# Patient Record
Sex: FEMALE | Race: WHITE | NOT HISPANIC OR LATINO | Employment: PART TIME | URBAN - METROPOLITAN AREA
[De-identification: names, ages, dates, MRNs, and addresses within clinical notes are randomized per-mention and may not be internally consistent; named-entity substitution may affect disease eponyms.]

---

## 2017-01-06 ENCOUNTER — TELEPHONE (OUTPATIENT)
Dept: GASTROENTEROLOGY | Facility: CLINIC | Age: 52
End: 2017-01-06

## 2017-01-06 NOTE — TELEPHONE ENCOUNTER
Called pt, no answer, LM with my direct line to return call  Need to get pt scheduled on Magdalena Morales's schedule

## 2017-01-11 NOTE — TELEPHONE ENCOUNTER
Came across pts cell number  I called, no answer, LM with direct line explaining to return call so I can get her scheduled

## 2017-01-19 NOTE — TELEPHONE ENCOUNTER
Called pt, no answer, LM explaining I verified her insurance. We do take it and I was calling to get her scheduled.  Left my direct line so she can return my call

## 2017-01-20 NOTE — TELEPHONE ENCOUNTER
Pt returned my call yesterday  I called her back today, no answer, LM and apologized for not getting back with her and explained we are re vamping our schedules. I will call her one day next wk to get her scheduled. Left my direct line in case she had any questions

## 2017-02-02 ENCOUNTER — TELEPHONE (OUTPATIENT)
Dept: GASTROENTEROLOGY | Facility: CLINIC | Age: 52
End: 2017-02-02

## 2017-02-02 NOTE — TELEPHONE ENCOUNTER
Called and spoke w pt  Feb dates and times didn't work for pt  Scheduled her for march 9th @ 1  Pt verbalized understanding and mailing appt slip

## 2017-03-01 ENCOUNTER — TELEPHONE (OUTPATIENT)
Dept: GASTROENTEROLOGY | Facility: CLINIC | Age: 52
End: 2017-03-01

## 2017-03-09 ENCOUNTER — LAB VISIT (OUTPATIENT)
Dept: LAB | Facility: HOSPITAL | Age: 52
End: 2017-03-09
Attending: INTERNAL MEDICINE
Payer: COMMERCIAL

## 2017-03-09 ENCOUNTER — OFFICE VISIT (OUTPATIENT)
Dept: GASTROENTEROLOGY | Facility: CLINIC | Age: 52
End: 2017-03-09
Payer: COMMERCIAL

## 2017-03-09 ENCOUNTER — TELEPHONE (OUTPATIENT)
Dept: GASTROENTEROLOGY | Facility: CLINIC | Age: 52
End: 2017-03-09

## 2017-03-09 VITALS
DIASTOLIC BLOOD PRESSURE: 90 MMHG | TEMPERATURE: 98 F | HEART RATE: 64 BPM | HEIGHT: 62 IN | WEIGHT: 120.38 LBS | SYSTOLIC BLOOD PRESSURE: 137 MMHG | BODY MASS INDEX: 22.15 KG/M2 | RESPIRATION RATE: 20 BRPM

## 2017-03-09 DIAGNOSIS — K51.30 ULCERATIVE PROCTOSIGMOIDITIS, WITHOUT COMPLICATIONS: Primary | ICD-10-CM

## 2017-03-09 DIAGNOSIS — K63.5 POLYP OF ASCENDING COLON, UNSPECIFIED TYPE: ICD-10-CM

## 2017-03-09 DIAGNOSIS — Z91.89 HIGH RISK FOR COLON CANCER: ICD-10-CM

## 2017-03-09 DIAGNOSIS — K51.30 ULCERATIVE PROCTOSIGMOIDITIS, WITHOUT COMPLICATIONS: ICD-10-CM

## 2017-03-09 DIAGNOSIS — F41.9 ANXIETY: ICD-10-CM

## 2017-03-09 LAB
BILIRUB UR QL STRIP: NEGATIVE
CLARITY UR REFRACT.AUTO: CLEAR
COLOR UR AUTO: YELLOW
GLUCOSE UR QL STRIP: NEGATIVE
HGB UR QL STRIP: NEGATIVE
KETONES UR QL STRIP: NEGATIVE
LEUKOCYTE ESTERASE UR QL STRIP: NEGATIVE
MICROSCOPIC COMMENT: ABNORMAL
NITRITE UR QL STRIP: NEGATIVE
PH UR STRIP: 6 [PH] (ref 5–8)
PROT UR QL STRIP: NEGATIVE
RBC #/AREA URNS AUTO: 6 /HPF (ref 0–4)
SP GR UR STRIP: 1.02 (ref 1–1.03)
SQUAMOUS #/AREA URNS AUTO: 1 /HPF
URN SPEC COLLECT METH UR: NORMAL
UROBILINOGEN UR STRIP-ACNC: NEGATIVE EU/DL

## 2017-03-09 PROCEDURE — 81001 URINALYSIS AUTO W/SCOPE: CPT

## 2017-03-09 PROCEDURE — 99999 PR PBB SHADOW E&M-EST. PATIENT-LVL IV: CPT | Mod: PBBFAC,,, | Performed by: INTERNAL MEDICINE

## 2017-03-09 PROCEDURE — 1160F RVW MEDS BY RX/DR IN RCRD: CPT | Mod: S$GLB,,, | Performed by: INTERNAL MEDICINE

## 2017-03-09 PROCEDURE — 99205 OFFICE O/P NEW HI 60 MIN: CPT | Mod: S$GLB,,, | Performed by: INTERNAL MEDICINE

## 2017-03-09 RX ORDER — HYDROCHLOROTHIAZIDE 12.5 MG/1
12.5 TABLET ORAL DAILY
Status: ON HOLD | COMMUNITY
Start: 2017-01-25 | End: 2017-12-01 | Stop reason: ALTCHOICE

## 2017-03-09 RX ORDER — MESALAMINE 1.2 G/1
3 TABLET, DELAYED RELEASE ORAL DAILY
COMMUNITY
Start: 2017-03-07 | End: 2017-05-24

## 2017-03-09 NOTE — MR AVS SNAPSHOT
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  Call for this free service at [phone].             Medications                Verify that the below list of medications is an accurate representation of the medications you are currently taking.  If none reported, the list may be blank. If incorrect, please contact your healthcare provider. Carry this list with you in case of emergency.           Current Medications     hydrochlorothiazide (HYDRODIURIL) 12.5 MG Tab once daily.    LIALDA 1.2 gram TbEC Take 3 tablets by mouth once daily.           Clinical Reference Information           Your Vitals Were     BP Pulse Temp Resp Height Weight    137/90 (BP Location: Left arm, Patient Position: Sitting, BP Method: Automatic) 64 98.2 °F (36.8 °C) 20 5' 2" (1.575 m) 54.6 kg (120 lb 5.9 oz)    BMI                22.02 kg/m2          Blood Pressure          Most Recent Value    BP  (!)  137/90      Allergies as of 3/9/2017     Pcn [Penicillins]      Immunizations Administered on Date of Encounter - 3/9/2017     None      Orders Placed During Today's Visit      Normal Orders This Visit    " Case request GI: SIGMOIDOSCOPY-FLEXIBLE     Future Labs/Procedures Expected by Expires    C-reactive protein  3/9/2017 5/8/2018    CBC auto differential  3/9/2017 5/8/2018    Comprehensive metabolic panel  3/9/2017 5/8/2018    Hepatitis B surface antibody  3/9/2017 5/8/2018    Sedimentation rate, manual  3/9/2017 5/8/2018    Urinalysis  3/9/2017 3/9/2018    Varicella zoster antibody, IgG  3/9/2017 5/8/2018    Vitamin D  3/9/2017 5/8/2018      [facility] Sign-Up     Activating your [facility] account is as easy as 1-2-3!     1) Visit [email or web address], select Sign Up Now, enter this activation code and your date of birth, then select Next.  [email or web address]  Expires: 4/23/2017  2:15 PM      2) Create a username and password to use when you visit [facility] in the future and select a security question in case you lose your password and select Next.    3) Enter your e-mail address and click Sign Up!    Additional Information  If you have questions, please e-mail [email or web address] or call [phone] to talk to our [facility] staff. Remember, [facility] is NOT to be used for urgent needs. For medical emergencies, dial 911.         Instructions    - Instructions:  - labs today  - continue lialda 3.6 g/day  - call us a month before you run out for refills  - flex sig in 2 mos  - call if any worsening symptoms  - Avoid all NSAIDs (Advil, Ibuprofen, Motrin, Aspirin, Naprosyn, Aleve)  - get pap smears, eye exam, skin exam  - Use antibiotics with caution  - Vaccines- UTD:  Shingles, tetanus  - follow-up with Magdalena in 1 week after her flex sig               Language Assistance Services     ATTENTION: Language assistance services are available, free of charge. Please call [phone].      ATENCIÓN: Si habla español, tiene a nassar disposición servicios gratuitos de asistencia lingüística. Llame al [phone].     CHÚ Ý: N?u b?n nói Ti?ng Vi?t, có các d?ch v? h? tr? ngôn ng? mi?n phí iliah cho b?n. G?i s? [phone].          [facility] complies with applicable Federal civil rights laws and does not discriminate on the basis of race, color, national origin, age, disability, or sex.

## 2017-03-09 NOTE — PATIENT INSTRUCTIONS
- Instructions:  - labs today  - continue lialda 3.6 g/day  - call us a month before you run out for refills  - flex sig in 2 mos  - call if any worsening symptoms  - Avoid all NSAIDs (Advil, Ibuprofen, Motrin, Aspirin, Naprosyn, Aleve)  - get pap smears, eye exam, skin exam  - Use antibiotics with caution  - Vaccines- UTD:  Shingles, tetanus  - follow-up with Magdalena in 1 week after her flex sig

## 2017-03-09 NOTE — PROGRESS NOTES
[facility]          Inflammatory Bowel Disease New Patient Consultation Note            Dr. Mike Walsh    TODAY'S VISIT DATE:  3/9/2017    Reason for Consult:    Chief Complaint   Patient presents with    Ulcerative Colitis       PCP: Ciera Freeman      Referring MD:   Self Referral    History of Present Illness:   Ansley Bautista who is a 51 y.o. female seen today at the [facility] on 03/09/2017 for inflammatory bowel disease- ulcerative colitis.  Pertinent past medical history includes anxiety, colon polyp (8/2016 serrated polyp in ascending colon).   She has generalized anxiety and has increased diarrhea when this occurs for most of her life.  At the end of July 2016 she began with rectal bleeding with blood on toilet paper and increased gas.  She met Dr. Gracia and he scheduled her for a colonoscopy.  Colonoscopy 8/29/16 showed 4 mm ascending colon serrated polyp with inflammation characterized by altered vascularity, congestion (edema), erosions, erythema, friability, granularity, mucus, and shallow ulcerations found in a continuous and circumferential pattern from the rectum to the sigmoid colon. Biopsies showed chronic active inflammation with focal crypt architectural distortion with neutrophilic cryptitis and crypt abscesses.  She was diagnosed with ulcerative proctosigmoiditis and started on apriso but this was too expensive and she eventually started on lialda 4.8 g/day.  She continued this for 1 month and she went into clinical remission. After a month she started weaning down on the lialda per MD recommendations to lialda 2.4 g/day. At the mid 12/2016 she missed about 2 days of lialda and had recurrent symptoms. She had about 5- 8 loose BMs/day with no blood but urgency and incontinence. She went back to taking lialda 4.8 g/day per recommendations by the on call MD.  She was given flagyl and unknown antibiotic and had diarrhea and metallic  "taste. After stopping flagyl and cipro symptoms improved. By early 2017 she was in clinical remission on lialda 4.8 g/day and went back down to 3.6 g/day less than month.     Currently 2 formed Bms/day with no urgency, no incontinence, no nocturnal bowel movements.  She started with sinus cold symptoms 2 weeks ago with congestion, head pressure, "tickling of throat." Knee pain and history of left shoulder bursitis, tennis elbow. Patient has no other gastrointestinal/constitutional complaints including no fevers or chills, weight loss, nausea/vomiting (including no hematemesis), dysphagia, abdominal pain. Also patient does not have any extraintestinal manifestations of their inflammatory bowel disease including no eye pain/redness, skin lesions/rashes, joint problems, oral ulcers.    Pertinent Endoscopy/Imagin2016 Colonoscopy: 4 mm sessile polyp in the ascending colon--removed (path: benign serrated polyp); inflammation characterized by altered vascularity, congestion (edema), erosions, erythema, friability, granularity, mucus, and shallow ulcerations found in a continuous and circumferential pattern from the rectum to the sigmoid colon (path-sigmoid & rectum: chronic active colitis & proctitis; chronic active coloproctitis characterized by lymphoplasmacytosis, focal crypt architectural distortion; focal Paneth cell metaplasia; neutrophilic cryptitis and crypt abscesses; no epitheliod granulomas identified); moderated and graded as Berger score 2 (no mention of TI or rest of colon)    Pertinent Labs:  No results found for: STOOLCULTURE, TEHMNHSRRQ1K, HNYFQJOYBS2U, CDIFFICILEAN, CDIFFTOX, CDIFFICILEBY  No results found for: CRP  No results found for: SSJRHKEN07CM  No results found for: HEPBIGM, HEPBCAB, HEPBSURFABQU  No results found for: VARICELLAZOS, VARICELLAINT  No results found for: NIL, TBAG, TBAGNIL, MITOGENNIL, TBGOLD  No results found for: TPMTRESULT  No results found for: ANSADAINIT, INFLIXIMAB, " INFLIXINTERP    Prior IBD Therapies:  cipro/flagyl    Current IBD Therapies:  lialda 3.6 g/day    Vaccinations:  Flu shot:  Never had  Chickenpox status/Varicella vaccine: had chickenpox as a child  No results found for: VARICELLAZOS, VARICELLAINT  Tetanus: unknown  Pneumonia vaccine: never had  Hepatitis B: never had  No results found for: HEPBSAB  Hepatitis A: NA  HPV: NA   Meningococcal: NA     NSAID use/indication:  Ibuprofen- headache 3 days ago    Narcotic use:  none    Alternative/Complementary Meds for IBD:  Biotin, glucosamine- not on it now    Review of Systems   Constitutional: Negative for chills, fever and weight loss.   HENT:        No oral ulcers, dysphagia, oral thrush   Eyes: Negative for blurred vision, pain and redness.   Respiratory: Negative for cough and shortness of breath.    Cardiovascular: Negative for chest pain.   Gastrointestinal: Negative for abdominal pain, heartburn, nausea and vomiting.   Genitourinary: Negative for dysuria and hematuria.   Musculoskeletal: Positive for joint pain. Negative for back pain.   Skin: Negative for rash.   Psychiatric/Behavioral: Negative for depression. The patient is nervous/anxious. The patient does not have insomnia.        Medical/Surgical History:    has a past medical history of Colon polyp and Ulcerative colitis.   has a past surgical history that includes cyst removal off of breast; Colonoscopy; breast augumentation; Partial hysterectomy; bilateral knee scopes; and surgery on both feet.    Family History: family history includes Diabetes in her father; Diverticulitis in her mother; Heart disease in her father; Hypertension in her brother, father, and sister. There is no history of Celiac disease, Cirrhosis, Colon cancer, Colon polyps, Crohn's disease, Cystic fibrosis, Esophageal cancer, Inflammatory bowel disease, Hemochromatosis, Irritable bowel syndrome, Liver cancer, Liver disease, Rectal cancer, Stomach cancer, Ulcerative colitis, or Elpidio's  disease.    Social History:  reports that she has never smoked- stopped in her 20s. She does not have any smokeless tobacco history on file. She reports that she drinks about 0.6 - 1.2 oz of alcohol per week  She reports that she does not use illicit drugs.    Review of patient's allergies indicates:  Allergies not on file    Outpatient Prescriptions Marked as Taking for the 3/9/17 encounter (Office Visit) with Mike Walsh MD   Medication Sig Dispense Refill    hydrochlorothiazide (HYDRODIURIL) 12.5 MG Tab once daily.      LIALDA 1.2 gram TbEC Take 3 tablets by mouth once daily.         Vital Signs:  BP: (!) 131/92 Temp: 98.2 °F (36.8 °C) Pulse: 67 Resp: 20  Weight: 54.6 kg (120 lb 5.9 oz)  `   Physical Exam   Constitutional: She is oriented to person, place, and time. She appears well-developed.   HENT:   Mouth/Throat: Oropharynx is clear and moist. No oral lesions.   No oral ulcers or thrush   Eyes: Conjunctivae are normal. Pupils are equal, round, and reactive to light.   Cardiovascular: Normal rate and regular rhythm.    Pulmonary/Chest: Effort normal and breath sounds normal.   Abdominal: Soft. There is no tenderness.   Musculoskeletal:        Right lower leg: She exhibits no swelling.        Left lower leg: She exhibits no swelling.   Neurological: She is alert and oriented to person, place, and time.   Skin: No rash noted.   Psychiatric: She has a normal mood and affect.   Nursing note and vitals reviewed.    Labs:  No results found for: STOOLCULTURE, ZVPWJHYAAQ8A, QJFDKHFDYW2W, CDIFFICILEAN, CDIFFTOX, CDIFFICILEBY  No results found for: WBC, HGB, HCT, MCV, PLT, ALT, AST, GGT, ALKPHOS, BILITOT, TSH, FREET4, SEDRATE, CRP, TTGIGA, IGA, [id number]  No results found for: HEPBIGM, HEPBCAB, HEPBSAB, HEPBSURFABQU  No results found for: VARICELLAZOS, VARICELLAINT  No results found for: NIL, TBAG, TBAGNIL, MITOGENNIL, TBGOLD  No results found for: TPMTRESULT  No results found for: ANSADAINIT, INFLIXIMAB,  INFLIXINTERP    Assessment/Plan:  Ansley Bautista is a 51 y.o. female with ulcerative proctosigmoiditis (symptoms 7/2016, diagnosis 8/2016), anxiety, small serrated colon polyp removed (colonoscopy 8/2016) who has had anxiety and occasional diarrhea for most of her life. In July 2016 she developed rectal bleeding, more frequent bowel movements with flatulence. 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  Patient was started on lialda 4.8 g/day and achieved clinical remission after 1 month.  After that her MD weaned her lialda to 2.4 g/day.  In approximately mid 12/2016 she missed a few days of lialda because she forgot and had recurrent symptoms.  She increased lialda back from 2.4 to 4.8 g/day and was given some antibiotics (unclear with metronidazole what was given) but this caused metallic taste and diarrhea.  After discontinuing antibiotic symptoms resolved and by end of Jan 2017/early Feb 2017 she achieved remission on lialda 4.8 g/day and has been on lialda 3.6 g/day for the past month doing well.      Patient is in clinical remission on lialda 3.6 g/day but I would like her to continue this dose going forward. We discussed in detail the importance of stopping NSAIDs and taking antibiotics with caution. Pt prescribed Z pamela today for sinus infection.  Patient will get flex sig done within next 2 mos to document response to lialda and will call with any worsening symptoms.  I offered her psychology services for her anxiety and she is considering it and will discuss this at next visit. Basic labs today.     # Ulcerative proctosigmoiditis:  - continue lialda 3.6 g/day  - call with any worsening symptoms  - flex sig in 2 mos  - if flex sig normal, colonoscopy in about 2-3 years but sooner if symptoms  - basic labs: CBC, CMP, ESR, CRP, vitamin D  - drug monitoring labs: Cr/UA yearly    # High risk colon cancer:   - risk factors:  Small  serrated adenoma 8/2016, symptom onset for proctosigmoiditis 7/2016   - colonoscopy for surveillance in 8/2021 and will eventually need colonoscopy every 1- 2 years after 12-15 years of disease starting in [id number]    # Anxiety:   - offered psychology services and pt thinking about it and will revisit at next visit  - pt not interested in starting any long term meds    # IBD specific health maintenance:   Pap smear per age  Opthamologic exam recommended yearly    Dermatologic exam recommended yearly     Bone health:  Risk of osteopenia/osteoporosis:  Risks factors: post-menopausal women regardless of disease status  Vitamin D: vitamin D ordered today    Vaccine counseling:  - VZV IgG, HBsAb today   - Tetanus every 10 years recommended  - Flu shot yearly recommended  - Pneumonia vaccine recommended if plans on immunosuppression    Follow up: 2 months- 1 week after flex sig with Magdalena Morales    Total visit time was 60 minutes, more than 50% of which was spent in face-to-face counseling with patient regarding evaluation and management goals and treatment options for ulcerative colitis     Thank you again for sending Ansley Bautista to see Dr. Mike Walsh today at the [facility]. Please don't hesitate to contact Dr. Walsh if there are any questions regarding this evaluation, or if you have any other patients with inflammatory bowel disease for whom you would like a consultation. You can reach Dr. Walsh at [phone] or by email at [email or web address]    Mike Walsh MD  Department of Gastroenterology  Medical Director, Inflammatory Bowel Disease

## 2017-03-10 ENCOUNTER — TELEPHONE (OUTPATIENT)
Dept: GASTROENTEROLOGY | Facility: CLINIC | Age: 52
End: 2017-03-10

## 2017-03-11 ENCOUNTER — NURSE TRIAGE (OUTPATIENT)
Dept: ADMINISTRATIVE | Facility: CLINIC | Age: 52
End: 2017-03-11

## 2017-03-11 NOTE — TELEPHONE ENCOUNTER
"  Reason for Disposition   [1] MODERATE weakness (i.e., interferes with work, school, normal activities) AND [2] persists > 3 days    Answer Assessment - Initial Assessment Questions  1. DESCRIPTION: "Describe how you are feeling."      General fatigue   2. SEVERITY: "How bad is it?"  "Can you stand and walk?"    - MILD - Feels weak or tired, but does not interfere with work, school or normal activities    - MODERATE - Able to stand and walk; weakness interferes with work, school, or normal activities    - SEVERE - Unable to stand or walk      Moderate   3. ONSET:  "When did the weakness begin?"      Several weeks   4. CAUSE: "What do you think is causing the weakness?"      Patient believes its related to low sodium and low potassium   5. OTHER SYMPTOMS: "Do you have any other symptoms?" (e.g., chest pain, fever, cough, SOB, vomiting, diarrhea, bleeding)      No   6. PREGNANCY: "Is there any chance you are pregnant?" "When was your last menstrual period?"      Pre-menopausal    Protocols used: ST WEAKNESS (GENERALIZED) AND FATIGUE-A-AH    "

## 2017-03-11 NOTE — TELEPHONE ENCOUNTER
Spoke with on-call provider Dr. ELIJAH Capone states he does not accepts call for that particular provider Dr. ELIJAH Freeman. [facility] states confirmed there are no assigned providers for the other [facility] gastro group. Call placed to patient to informed her of such and to reiterate [facility] protocol care advisement of within four hours of triage to seek medical evaluation. Unsuccessful attempts to reach patient to informed her of such no answer; left voice mail.

## 2017-03-13 ENCOUNTER — TELEPHONE (OUTPATIENT)
Dept: GASTROENTEROLOGY | Facility: CLINIC | Age: 52
End: 2017-03-13

## 2017-03-13 NOTE — TELEPHONE ENCOUNTER
Returned pts call  Will transfer her to release of medical information office    Pt states she wont be scheduling for procedure until she gets her energy level up

## 2017-03-13 NOTE — TELEPHONE ENCOUNTER
----- Message from Anamika Archer MA sent at 3/13/2017 12:05 PM CDT -----  Contact: [phone]   SALVADOR Walsh   Pt's  PCP at [facility] wants to see her most recent lab work. Can she get an after visit summary or send call to medical records.   [phone]

## 2017-04-26 ENCOUNTER — TELEPHONE (OUTPATIENT)
Dept: ENDOSCOPY | Facility: HOSPITAL | Age: 52
End: 2017-04-26

## 2017-04-26 NOTE — TELEPHONE ENCOUNTER
Patient cannot scheduled Flex-Sig until June, when her  will be available to accompany her to the procedure. Patient will call back at a later date, once June has arrived.

## 2017-05-23 ENCOUNTER — TELEPHONE (OUTPATIENT)
Dept: GASTROENTEROLOGY | Facility: CLINIC | Age: 52
End: 2017-05-23

## 2017-05-23 DIAGNOSIS — K51.30: Primary | ICD-10-CM

## 2017-05-23 NOTE — TELEPHONE ENCOUNTER
----- Message from Laci Aguilera sent at 5/23/2017  4:05 PM CDT -----  Contact: Pt:[phone]  Pt called and states she called earlier to speak with the nurse and would like to speak with the nurse in regards to Rx [phone].

## 2017-05-24 RX ORDER — MESALAMINE 0.38 G/1
1.5 CAPSULE, EXTENDED RELEASE ORAL DAILY
Qty: 120 CAPSULE | Refills: 5 | Status: SHIPPED | OUTPATIENT
Start: 2017-05-24 | End: 2017-11-24 | Stop reason: SDUPTHER

## 2017-05-24 NOTE — TELEPHONE ENCOUNTER
Pt tried to refill her Lialda and she was told by her pharmacy that the savings card she was using has reached its max. So now the medication is going to cost her $200 a mth. ([facility] pharmacy) Tried an assistance program from [employer] and they make to much money. Pt has about 5 - 6 days left   She has noticed her stools are changing but she has also changed her diet. Her stools are not as formed as they had been. She's having about 2 - 5 BMs a day. No blood and no nocturnal. No fever or chills  She really thinks it is because she has started to eat more nuts and fruits

## 2017-05-24 NOTE — TELEPHONE ENCOUNTER
Spoke to patient re Mario monroe.  Discussed with Dr. Walsh and will try to send in an RX for Apriso 1.5 gm/day and send her a coupon card to see if this is more affordable.  Explained to please be patient and that I will work with the pharmacy to try to figure out the cheapest option for her to continue her 5ASA.  I also asked about her flex sig and her  has been on a turn around at work and has not been able to bring her.  She will be scheduling within the next few weeks and then should have a clinic visit with me 1 week later.  She will call us if there are any additional problems with her medications.  Patient verbalizes understanding and agrees with the treatment plan.  Questions answered.    KG

## 2017-05-25 ENCOUNTER — TELEPHONE (OUTPATIENT)
Dept: GASTROENTEROLOGY | Facility: CLINIC | Age: 52
End: 2017-05-25

## 2017-05-26 NOTE — TELEPHONE ENCOUNTER
----- Message from Susan Emilia sent at 5/25/2017  1:50 PM CDT -----  Contact: self - [phone]  Walsh - is calling re her meds  - states she left message yesterday -  Please call patient at

## 2017-06-01 ENCOUNTER — TELEPHONE (OUTPATIENT)
Dept: GASTROENTEROLOGY | Facility: CLINIC | Age: 52
End: 2017-06-01

## 2017-07-03 NOTE — TELEPHONE ENCOUNTER
Attempting to follow up with Ms. Bautista re setting up flex sig.    Pt was unable to set up prior due to spouse's work schedule.    Pt verbalized she would call to set up in June, however no record of call rcvd.     Left message for pt to please return call to office.    -Pt will need to schedule flex sig and 1 week clinic f/u with Magdalena after procedure.

## 2017-07-25 ENCOUNTER — TELEPHONE (OUTPATIENT)
Dept: GASTROENTEROLOGY | Facility: CLINIC | Age: 52
End: 2017-07-25

## 2017-07-25 NOTE — TELEPHONE ENCOUNTER
----- Message from Susan Shipmankert sent at 7/24/2017 10:58 AM CDT -----  Contact: self - [phone]  Jillian - can she still do cscope - was on z-pack for throat infection - please call patient at

## 2017-07-25 NOTE — TELEPHONE ENCOUNTER
Patient reported that she was taking Z-pack, but that it caused her to break out in a rash, so she stopped the medication and informed the prescribing MD.    I informed patient that the abovementioned will not effect her 8/8/2017 endoscopy procedure date. Patient verbalized understanding.    -ST

## 2017-08-02 ENCOUNTER — TELEPHONE (OUTPATIENT)
Dept: GASTROENTEROLOGY | Facility: CLINIC | Age: 52
End: 2017-08-02

## 2017-08-02 DIAGNOSIS — R53.83 FATIGUE, UNSPECIFIED TYPE: Primary | ICD-10-CM

## 2017-08-02 NOTE — TELEPHONE ENCOUNTER
----- Message from Anamika Archer MA sent at 8/2/2017  9:19 AM CDT -----  Contact: [phone]  SALVADOR Walsh  Pt states that she has a H/O ulcerative colitis and asking if there is anything OTC that she can safely take for sinus pressure   [phone]

## 2017-08-02 NOTE — TELEPHONE ENCOUNTER
Called and spoke with pt  I explained the only restriction for sinus meds is to make sure they do not have NSAIDs. Explained what NSAIDs are. Then I told her she can take Sudafed, Zyrtec D, Claritin D, and possibly Dayquil or Nyquil but to check the ingredients on the last 2 to make sure there are no NSAID's.   She then mentioned she has been fatigued lately. She said she called her PCP and was told that her PCP was leaving and they have to get her set up with a different physician and it will be awhile so that is why she is asking us.  She stated that generally when she feels like this her potassium is low. So she was wondering if she should get some labs and if so she would like to do them same day as her procedure 08/08. She was also wondering if the low potassium is from her UC

## 2017-08-03 NOTE — TELEPHONE ENCOUNTER
Called and spoke with pt  I got her labs scheduled for 08/08 @ 9:45  I noticed she needed to be scheduled for a follow up 1 wk after Flex with Magdalena. I went to schedule that and Dr Walsh is out the wk her follow up is due so I told her I would check and see if she needed to be here and I would call back to get her scheduled   She expressed understanding and appreciated the call

## 2017-08-08 ENCOUNTER — SURGERY (OUTPATIENT)
Age: 52
End: 2017-08-08

## 2017-08-08 ENCOUNTER — LAB VISIT (OUTPATIENT)
Dept: LAB | Facility: HOSPITAL | Age: 52
End: 2017-08-08
Attending: INTERNAL MEDICINE
Payer: COMMERCIAL

## 2017-08-08 ENCOUNTER — ANESTHESIA EVENT (OUTPATIENT)
Dept: ENDOSCOPY | Facility: HOSPITAL | Age: 52
End: 2017-08-08
Payer: COMMERCIAL

## 2017-08-08 ENCOUNTER — TELEPHONE (OUTPATIENT)
Dept: GASTROENTEROLOGY | Facility: CLINIC | Age: 52
End: 2017-08-08

## 2017-08-08 ENCOUNTER — ANESTHESIA (OUTPATIENT)
Dept: ENDOSCOPY | Facility: HOSPITAL | Age: 52
End: 2017-08-08
Payer: COMMERCIAL

## 2017-08-08 ENCOUNTER — HOSPITAL ENCOUNTER (OUTPATIENT)
Facility: HOSPITAL | Age: 52
Discharge: HOME | End: 2017-08-08
Attending: INTERNAL MEDICINE | Admitting: INTERNAL MEDICINE
Payer: COMMERCIAL

## 2017-08-08 VITALS
DIASTOLIC BLOOD PRESSURE: 88 MMHG | WEIGHT: 117 LBS | HEIGHT: 62 IN | HEART RATE: 64 BPM | RESPIRATION RATE: 16 BRPM | OXYGEN SATURATION: 96 % | SYSTOLIC BLOOD PRESSURE: 134 MMHG | BODY MASS INDEX: 21.53 KG/M2 | TEMPERATURE: 98 F

## 2017-08-08 VITALS — RESPIRATION RATE: 18 BRPM

## 2017-08-08 DIAGNOSIS — R53.83 FATIGUE, UNSPECIFIED TYPE: ICD-10-CM

## 2017-08-08 DIAGNOSIS — K51.30 ULCERATIVE RECTOSIGMOIDITIS WITHOUT COMPLICATION: Primary | ICD-10-CM

## 2017-08-08 DIAGNOSIS — K51.90 ULCERATIVE COLITIS: ICD-10-CM

## 2017-08-08 LAB
ALBUMIN SERPL BCP-MCNC: 3.7 G/DL
ALP SERPL-CCNC: 68 U/L
ALT SERPL W/O P-5'-P-CCNC: 20 U/L
ANION GAP SERPL CALC-SCNC: 7 MMOL/L
AST SERPL-CCNC: 17 U/L
BASOPHILS # BLD AUTO: 0.01 K/UL
BASOPHILS NFR BLD: 0.2 %
BILIRUB SERPL-MCNC: 0.6 MG/DL
BUN SERPL-MCNC: 17 MG/DL
CALCIUM SERPL-MCNC: 8.9 MG/DL
CHLORIDE SERPL-SCNC: 104 MMOL/L
CO2 SERPL-SCNC: 29 MMOL/L
CREAT SERPL-MCNC: 0.9 MG/DL
DIFFERENTIAL METHOD: NORMAL
EOSINOPHIL # BLD AUTO: 0 K/UL
EOSINOPHIL NFR BLD: 0 %
ERYTHROCYTE [DISTWIDTH] IN BLOOD BY AUTOMATED COUNT: 12.8 %
EST. GFR  (AFRICAN AMERICAN): >60 ML/MIN/1.73 M^2
EST. GFR  (NON AFRICAN AMERICAN): >60 ML/MIN/1.73 M^2
GLUCOSE SERPL-MCNC: 86 MG/DL
HCT VFR BLD AUTO: 42.5 %
HGB BLD-MCNC: 14.2 G/DL
LYMPHOCYTES # BLD AUTO: 1.2 K/UL
LYMPHOCYTES NFR BLD: 26.1 %
MCH RBC QN AUTO: 29 PG
MCHC RBC AUTO-ENTMCNC: 33.4 G/DL
MCV RBC AUTO: 87 FL
MONOCYTES # BLD AUTO: 0.4 K/UL
MONOCYTES NFR BLD: 8.3 %
NEUTROPHILS # BLD AUTO: 3 K/UL
NEUTROPHILS NFR BLD: 65 %
PLATELET # BLD AUTO: 196 K/UL
PMV BLD AUTO: 10.4 FL
POTASSIUM SERPL-SCNC: 4 MMOL/L
PROT SERPL-MCNC: 7 G/DL
RBC # BLD AUTO: 4.89 M/UL
SODIUM SERPL-SCNC: 140 MMOL/L
WBC # BLD AUTO: 4.56 K/UL

## 2017-08-08 PROCEDURE — 88305 TISSUE EXAM BY PATHOLOGIST: CPT | Mod: 26,,, | Performed by: PATHOLOGY

## 2017-08-08 PROCEDURE — 37000008 HC ANESTHESIA 1ST 15 MINUTES: Performed by: INTERNAL MEDICINE

## 2017-08-08 PROCEDURE — 45331 SIGMOIDOSCOPY AND BIOPSY: CPT | Mod: ,,, | Performed by: INTERNAL MEDICINE

## 2017-08-08 PROCEDURE — D9220A PRA ANESTHESIA: Mod: ANES,,, | Performed by: ANESTHESIOLOGY

## 2017-08-08 PROCEDURE — 63600175 PHARM REV CODE 636 W HCPCS: Performed by: NURSE ANESTHETIST, CERTIFIED REGISTERED

## 2017-08-08 PROCEDURE — 88305 TISSUE EXAM BY PATHOLOGIST: CPT | Performed by: PATHOLOGY

## 2017-08-08 PROCEDURE — 27201012 HC FORCEPS, HOT/COLD, DISP: Performed by: INTERNAL MEDICINE

## 2017-08-08 PROCEDURE — 85025 COMPLETE CBC W/AUTO DIFF WBC: CPT

## 2017-08-08 PROCEDURE — 80053 COMPREHEN METABOLIC PANEL: CPT

## 2017-08-08 PROCEDURE — D9220A PRA ANESTHESIA: Mod: CRNA,,, | Performed by: NURSE ANESTHETIST, CERTIFIED REGISTERED

## 2017-08-08 PROCEDURE — 36415 COLL VENOUS BLD VENIPUNCTURE: CPT

## 2017-08-08 PROCEDURE — 45331 SIGMOIDOSCOPY AND BIOPSY: CPT | Performed by: INTERNAL MEDICINE

## 2017-08-08 PROCEDURE — 25000003 PHARM REV CODE 250: Performed by: INTERNAL MEDICINE

## 2017-08-08 PROCEDURE — 37000009 HC ANESTHESIA EA ADD 15 MINS: Performed by: INTERNAL MEDICINE

## 2017-08-08 RX ORDER — PROPOFOL 10 MG/ML
VIAL (ML) INTRAVENOUS
Status: DISCONTINUED | OUTPATIENT
Start: 2017-08-08 | End: 2017-08-08

## 2017-08-08 RX ORDER — LIDOCAINE HCL/PF 100 MG/5ML
SYRINGE (ML) INTRAVENOUS
Status: DISCONTINUED | OUTPATIENT
Start: 2017-08-08 | End: 2017-08-08

## 2017-08-08 RX ORDER — SODIUM CHLORIDE 9 MG/ML
INJECTION, SOLUTION INTRAVENOUS CONTINUOUS
Status: DISCONTINUED | OUTPATIENT
Start: 2017-08-08 | End: 2017-08-08 | Stop reason: HOSPADM

## 2017-08-08 RX ADMIN — PROPOFOL 20 MG: 10 INJECTION, EMULSION INTRAVENOUS at 08:08

## 2017-08-08 RX ADMIN — PROPOFOL 80 MG: 10 INJECTION, EMULSION INTRAVENOUS at 08:08

## 2017-08-08 RX ADMIN — PROPOFOL 40 MG: 10 INJECTION, EMULSION INTRAVENOUS at 08:08

## 2017-08-08 RX ADMIN — SODIUM CHLORIDE: 0.9 INJECTION, SOLUTION INTRAVENOUS at 07:08

## 2017-08-08 RX ADMIN — LIDOCAINE HYDROCHLORIDE 60 MG: 20 INJECTION, SOLUTION INTRAVENOUS at 08:08

## 2017-08-08 NOTE — H&P
Short Stay Endoscopy History and Physical    PCP - Ciera Freeman MD  Referring Physician - Mike Walsh MD  [address] [place] [address]    Procedure - Flex sig  ASA - per anesthesia  Mallampati - per anesthesia  History of Anesthesia problems - no  Family history Anesthesia problems -  no   Plan of anesthesia - General    HPI  52 y.o. female  Reason for procedure: follow up ulcerative proctosigmoiditis    ROS:  Constitutional: No fevers, chills, No weight loss  CV: No chest pain  Pulm: No cough, No shortness of breath  GI: see HPI    Medical History:  has a past medical history of Colon polyp; Hypertension; and Ulcerative colitis.    Surgical History:  has a past surgical history that includes cyst removal off of breast; Colonoscopy; breast augumentation; Partial hysterectomy; bilateral knee scopes; and surgery on both feet.    Family History: family history includes Diabetes in her father; Diverticulitis in her mother; Heart disease in her father; Hypertension in her brother, father, and sister.. Otherwise no colon cancer, inflammatory bowel disease, or GI malignancies.    Social History:  reports that she has quit smoking. She has never used smokeless tobacco. She reports that she drinks about 0.6 - 1.2 oz of alcohol per week . She reports that she does not use drugs.    Review of patient's allergies indicates:   Allergen Reactions    Pcn [penicillins] Anaphylaxis       Medications:   Prescriptions Prior to Admission   Medication Sig Dispense Refill Last Dose    hydrochlorothiazide (HYDRODIURIL) 12.5 MG Tab once daily.   8/8/2017 at 0600    mesalamine (APRISO) 0.375 gram Cp24 Take 4 capsules (1.5 g total) by mouth once daily. 120 capsule 5 8/6/2017       Physical Exam:    Vital Signs:   Vitals:    08/08/17 0752   BP: (!) 183/95   Pulse: 73   Resp: 17   Temp: 97.9 °F (36.6 °C)       General Appearance: Well appearing in no acute distress  Lungs: CTA anteriorly  Heart:  Regular rate, S1,  S2 normal, no murmurs heard.  Abdomen: Soft, non tender, non distended with normal bowel sounds, no masses  Extremities: No edema    Labs:  Lab Results   Component Value Date    WBC 5.98 03/09/2017    HGB 14.7 03/09/2017    HCT 43.9 03/09/2017     03/09/2017    ALT 32 03/09/2017    AST 28 03/09/2017     (L) 03/09/2017    K 3.4 (L) 03/09/2017    CL 96 03/09/2017    CREATININE 0.9 03/09/2017    BUN 16 03/09/2017    CO2 30 (H) 03/09/2017       I have explained the risks and benefits of this endoscopic procedure to the patient including but not limited to bleeding, inflammation, infection, perforation, and death.      Mike Walsh MD

## 2017-08-08 NOTE — TELEPHONE ENCOUNTER
----- Message from Mike Walsh MD sent at 8/8/2017 11:12 AM CDT -----  Please let patient know that her labs are all normal including her potassium    SS

## 2017-08-08 NOTE — TELEPHONE ENCOUNTER
Called and spoke with pt  Explained message below. She had a few questions that I answered and expressed understanding.   Then I got her follow up scheduled for 08/28/2017 @ 1:00    Appoint reminder mailed

## 2017-08-08 NOTE — PATIENT INSTRUCTIONS
Discharge Summary/Instructions after an Endoscopic Procedure  Patient Name: Ansley Bautista  Patient MRN: [id number]  Patient YOB: 1965 Tuesday, August 08, 2017  Mike Walsh MD  RESTRICTIONS ON ACTIVITY:  - DO NOT drive a car, operate machinery, make legal/financial decisions, or   drink alcohol until the day after the procedure.    - The following day: return to full activity including work, except no heavy   lifting, straining or running for 3 days if polyps were removed.  - Diet: Eat and drink normally unless instructed otherwise.  TREATMENT FOR COMMON SIDE EFFECTS:  - Mild abdominal pain, bloating or excessive gas: rest, eat lightly and use   a heating pad.  - Sore Throat - treat with throat lozenges. Gargle with warm salt water.  SYMPTOMS TO WATCH FOR AND REPORT TO YOUR PHYSICIAN:  1. Severe abdominal pain or bloating.  2. Pain in chest.  3. Chills or fever occurring within 24 hours after a procedure.  4. A large amount of rectal bleeding, which would show as bright red,   maroon, or black stools. (A small amount of blood from the rectum is not   serious, especially if hemorrhoids are present.)  5. Because air was used during the procedure, expelling large amounts of air   from your rectum or belching is normal.  6. If a bowel prep was taken, you may not have a bowel movement for 1-3   days.  This is normal.  7. Go directly to the emergency room if you notice any of the following:   Chills and/or fever over 101 F   Persistent vomiting   Severe abdominal pain, other than gas cramps   Severe chest pain   Black, tarry stools   Any bleeding - exceeding one tablespoon  Your doctor recommends these additional instructions:  If any biopsies were performed, my office will call you in 5 to 6 business   days with any results.  Take a fiber supplement, for example Citrucel, Fibercon, Konsyl or   Metamucil.   You are being discharged to home.  For questions, problems or results please call your physician -  Mike Walsh MD at Work:  [phone].  [facility], EMERGENCY ROOM PHONE NUMBER: [phone]  IF A COMPLICATION OR EMERGENCY SITUATION ARISES AND YOU ARE UNABLE TO REACH   YOUR PHYSICIAN - GO TO THE EMERGENCY ROOM.  Mike Walsh MD  8/8/2017 8:57:16 AM  This report has been verified and signed electronically.

## 2017-08-08 NOTE — TRANSFER OF CARE
"Anesthesia Transfer of Care Note    Patient: Ansley Bautista    Procedure(s) Performed: Procedure(s) (LRB):  SIGMOIDOSCOPY-FLEXIBLE (N/A)    Patient location: PACU    Anesthesia Type: general    Transport from OR: Transported from OR on room air with adequate spontaneous ventilation    Post pain: adequate analgesia    Post assessment: no apparent anesthetic complications and tolerated procedure well    Post vital signs: stable    Level of consciousness: responds to stimulation    Nausea/Vomiting: no nausea/vomiting    Complications: none    Transfer of care protocol was followed      Last vitals:   Visit Vitals  /76 (BP Location: Left arm, Patient Position: Lying, BP Method: Automatic)   Pulse 66   Temp 36.7 °C (98.1 °F) (Oral)   Resp 14   Ht 5' 2" (1.575 m)   Wt 53.1 kg (117 lb)   SpO2 97%   Breastfeeding? No   BMI 21.40 kg/m²     "

## 2017-08-08 NOTE — ANESTHESIA POSTPROCEDURE EVALUATION
"Anesthesia Post Evaluation    Patient: Ansley Bautista    Procedure(s) Performed: Procedure(s) (LRB):  SIGMOIDOSCOPY-FLEXIBLE (N/A)    Final Anesthesia Type: general  Patient location during evaluation: GI PACU  Patient participation: Yes- Able to Participate  Level of consciousness: awake and alert, oriented and awake  Post-procedure vital signs: reviewed and stable  Pain management: adequate  Airway patency: patent  PONV status at discharge: No PONV  Anesthetic complications: no      Cardiovascular status: blood pressure returned to baseline and hemodynamically stable  Respiratory status: unassisted, spontaneous ventilation and room air  Hydration status: euvolemic  Follow-up not needed.        Visit Vitals  /88 (BP Location: Left arm, Patient Position: Sitting, BP Method: Automatic)   Pulse 64   Temp 36.7 °C (98.1 °F) (Oral)   Resp 16   Ht 5' 2" (1.575 m)   Wt 53.1 kg (117 lb)   SpO2 96%   Breastfeeding? No   BMI 21.40 kg/m²       Pain/Starr Score: Pain Assessment Performed: Yes (8/8/2017  8:57 AM)  Presence of Pain: denies (8/8/2017  9:25 AM)  Starr Score: 10 (8/8/2017  9:25 AM)      "

## 2017-08-08 NOTE — ANESTHESIA PREPROCEDURE EVALUATION
08/08/2017  Ansley Bautista is a 52 y.o., female.    Anesthesia Evaluation    I have reviewed the Patient Summary Reports.    I have reviewed the Nursing Notes.      Review of Systems  Anesthesia Hx:  No problems with previous Anesthesia    Cardiovascular:   Hypertension    Hepatic/GI:   PUD,        Physical Exam  General:  Well nourished    Airway/Jaw/Neck:  Airway Findings: Mouth Opening: Normal Tongue: Normal  General Airway Assessment: Adult  Mallampati: II  TM Distance: Normal, at least 6 cm  Jaw/Neck Findings:  Neck ROM: Normal ROM     Eyes/Ears/Nose:  Eyes/Ears/Nose Findings:    Dental:  Dental Findings: In tact   Chest/Lungs:  Chest/Lungs Findings: Normal Respiratory Rate     Heart/Vascular:  Heart Findings: Rate: Normal  Rhythm: Regular Rhythm        Mental Status:  Mental Status Findings:  Cooperative, Alert and Oriented         Anesthesia Plan  Type of Anesthesia, risks & benefits discussed:  Anesthesia Type:  general  Patient's Preference: general  Intra-op Monitoring Plan: standard ASA monitors  Intra-op Monitoring Plan Comments:   Post Op Pain Control Plan:   Post Op Pain Control Plan Comments:   Induction:   IV  Beta Blocker:  Patient is not currently on a Beta-Blocker (No further documentation required).       Informed Consent: Patient understands risks and agrees with Anesthesia plan.  Questions answered. Anesthesia consent signed with patient.  ASA Score: 2     Day of Surgery Review of History & Physical:    H&P update referred to the surgeon.         Ready For Surgery From Anesthesia Perspective.

## 2017-08-15 ENCOUNTER — TELEPHONE (OUTPATIENT)
Dept: ENDOSCOPY | Facility: HOSPITAL | Age: 52
End: 2017-08-15

## 2017-08-28 ENCOUNTER — OFFICE VISIT (OUTPATIENT)
Dept: GASTROENTEROLOGY | Facility: CLINIC | Age: 52
End: 2017-08-28
Payer: COMMERCIAL

## 2017-08-28 VITALS
DIASTOLIC BLOOD PRESSURE: 86 MMHG | RESPIRATION RATE: 14 BRPM | HEART RATE: 58 BPM | SYSTOLIC BLOOD PRESSURE: 138 MMHG | TEMPERATURE: 98 F | BODY MASS INDEX: 22.07 KG/M2 | HEIGHT: 62 IN | WEIGHT: 119.94 LBS

## 2017-08-28 DIAGNOSIS — I10 ESSENTIAL HYPERTENSION: ICD-10-CM

## 2017-08-28 DIAGNOSIS — K63.5 POLYP OF ASCENDING COLON, UNSPECIFIED TYPE: ICD-10-CM

## 2017-08-28 DIAGNOSIS — K51.30 ULCERATIVE RECTOSIGMOIDITIS WITHOUT COMPLICATION: Primary | ICD-10-CM

## 2017-08-28 DIAGNOSIS — Z91.89 HIGH RISK FOR COLON CANCER: ICD-10-CM

## 2017-08-28 DIAGNOSIS — F41.9 ANXIETY: ICD-10-CM

## 2017-08-28 PROCEDURE — 99999 PR PBB SHADOW E&M-EST. PATIENT-LVL IV: CPT | Mod: PBBFAC,,, | Performed by: NURSE PRACTITIONER

## 2017-08-28 PROCEDURE — 99215 OFFICE O/P EST HI 40 MIN: CPT | Mod: S$GLB,,, | Performed by: NURSE PRACTITIONER

## 2017-08-28 PROCEDURE — 3008F BODY MASS INDEX DOCD: CPT | Mod: S$GLB,,, | Performed by: NURSE PRACTITIONER

## 2017-08-28 NOTE — PROGRESS NOTES
[facility]             Inflammatory Bowel Disease Follow-up  Note            Mike Walsh MD & EUFEMIA Nunn  TODAY'S VISIT DATE:  8/28/2017    Chief Complaint:   Chief Complaint   Patient presents with    Ulcerative Colitis     PCP: Ciera Freeman    Previous History:   Ansley Bautista who is a 52 y.o. female with ulcerative proctosigmoiditis (symptoms 7/2016, diagnosis 8/2016), anxiety, small serrated colon polyp removed (colonoscopy 8/2016) who has had anxiety and occasional diarrhea for most of her life. In July 2016 she developed rectal bleeding, more frequent bowel movements with flatulence. 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  Patient was started on lialda 4.8 g/day and achieved clinical remission after 1 month.  After that her MD weaned her lialda to 2.4 g/day.  In approximately mid 12/2016 she missed a few days of lialda because she forgot and had recurrent symptoms.  She increased lialda back from 2.4 to 4.8 g/day and was given some antibiotics (unclear with metronidazole what was given) but this caused metallic taste and diarrhea.  After discontinuing antibiotic symptoms resolved and by end of Jan 2017/early Feb 2017 she achieved remission on lialda 4.8 g/day and has been on lialda 3.6 g/day and continued to do well. We planned for a flex sig to assess for medication response.      Interval History:  - current IBD meds: apriso 1.5 gm/day (started in 5/2017), changed from lialda due to cost  - 3 loose-formed Bowel Movements/day  - 8/8/2017 flex sig: Some mild decrease in vasculature in the rectum, inflammatory pseudopolyp in the rectum otherwise no active inflammation consistent with remission, descending, sigmoid and distal/mid transverse colon appeared normal (path-transverse, descending, sigmoid, rectum: normal)  - constitutional/GI symptoms: no fevers/chills, weight loss, dysphagia,  GERD, abdominal pain  - extraintestinal manifestations: no eye pain/redness, skin lesions/rashes, liver problems, joint pain/swelling/stiffness    Pertinent Endoscopy/Imagin2016 Colonoscopy: 4 mm sessile polyp in the ascending colon--removed (path: benign serrated polyp); inflammation characterized by altered vascularity, congestion (edema), erosions, erythema, friability, granularity, mucus, and shallow ulcerations found in a continuous and circumferential pattern from the rectum to the sigmoid colon (path-sigmoid & rectum: chronic active colitis & proctitis; chronic active coloproctitis characterized by lymphoplasmacytosis, focal crypt architectural distortion; focal Paneth cell metaplasia; neutrophilic cryptitis and crypt abscesses; no epitheliod granulomas identified); moderated and graded as Berger score 2 (no mention of TI or rest of colon)  2017 flex sig: Some mild decrease in vasculature in the rectum, inflammatory pseudopolyp in the rectum otherwise no active inflammation consistent with remission, descending, sigmoid and distal/mid transverse colon appeared normal (path-transverse, descending, sigmoid, rectum: normal)    Pertinent Labs:  No results found for: STOOLCULTURE, IDYGRJOIXE3R, BTMIJOUZHG6T, CDIFFICILEAN, CDIFFTOX, CDIFFICILEBY  Lab Results   Component Value Date    CRP 1.4 2017     Lab Results   Component Value Date    HKFCODEH08BO 32 2017     No results found for: HEPBIGM, HEPBCAB, HEPBSURFABQU  Lab Results   Component Value Date    VARICELLAZOS 4.65 (H) 2017    VARICELLAINT Positive (A) 2017     No results found for: NIL, TBAG, TBAGNIL, MITOGENNIL, TBGOLD  No results found for: TPMTRESULT  No results found for: ANSADAINIT, INFLIXIMAB, INFLIXINTERP    Prior IBD Meds:  cipro/flagyl    Current IBD Meds:  apriso 1.5 gm/day    Vaccinations:  Flu shot: never had, due 2017  Chickenpox status/Varicella vaccine:  Lab Results   Component Value Date    VARICELLAZOS  "4.65 (H) 03/09/2017    VARICELLAINT Positive (A) 03/09/2017   Tetanus: unknown  Pneumonia 13 (PCV13) vaccine: never had--will recommend if starting immunosuppressives  Pneumonia 23 (PPSV23) vaccine: never had--will recommend if starting immunosuppressives  Hepatitis B:   Lab Results   Component Value Date    HEPBSAB Positive (A) 03/09/2017   Hepatitis A: NA  HPV: NA   Meningococcal: NA     NSAID use/indication:  Ibuprofen- headache 3 days ago    Narcotic use:  none    Alternative/Complementary Meds for IBD:  Biotin, glucosamine- not on it now    Review of Systems   Constitutional: Negative for chills, fever and weight loss.   HENT:        No oral ulcers, dysphagia, oral thrush   Eyes: Negative for blurred vision, pain and redness.   Respiratory: Negative for cough and shortness of breath.    Cardiovascular: Negative for chest pain.   Gastrointestinal: Negative for abdominal pain, heartburn, nausea and vomiting.   Genitourinary: Negative for dysuria and hematuria.   Musculoskeletal: Negative for back pain and joint pain.   Skin: Negative for rash.   Psychiatric/Behavioral: Negative for depression. The patient is not nervous/anxious and does not have insomnia.      All Medical History/Surgical History/Family History/Social History/Allergies have been reviewed and updated in EMR    Review of patient's allergies indicates:   Allergen Reactions    Pcn [penicillins] Anaphylaxis     Outpatient Prescriptions Marked as Taking for the 8/28/17 encounter (Office Visit) with TANGELA Gamez   Medication Sig Dispense Refill    hydrochlorothiazide (HYDRODIURIL) 12.5 MG Tab once daily.      mesalamine (APRISO) 0.375 gram Cp24 Take 4 capsules (1.5 g total) by mouth once daily. 120 capsule 5     Vital Signs:  Vitals:    08/28/17 1309 08/28/17 1312   BP: (!) 156/84 138/86   Pulse: 60 (!) 58   Resp: 14    Temp: 98.1 °F (36.7 °C)    Weight: 54.4 kg (119 lb 14.9 oz)    Height: 5' 2" (1.575 m)    PainSc: 0-No pain      Physical Exam "   Constitutional: She is oriented to person, place, and time. She appears well-developed.   HENT:   Mouth/Throat: Oropharynx is clear and moist. No oral lesions.   No oral ulcers or thrush   Eyes: Conjunctivae are normal. Pupils are equal, round, and reactive to light.   Cardiovascular: Normal rate and regular rhythm.    Pulmonary/Chest: Effort normal and breath sounds normal.   Abdominal: Soft. There is no tenderness.   Musculoskeletal:        Right lower leg: She exhibits no swelling.        Left lower leg: She exhibits no swelling.   Neurological: She is alert and oriented to person, place, and time.   Skin: No rash noted.   Psychiatric: She has a normal mood and affect.   Nursing note and vitals reviewed.    Labs: Reviewed and pertinent noted above    Assessment/Plan:  Ansley Bautista is a 52 y.o. female with ulcerative proctosigmoiditis (symptoms 7/2016, diagnosis 8/2016), anxiety, small serrated colon polyp removed (colonoscopy 8/2016) who has had anxiety and occasional diarrhea for most of her life.  We changed her to apriso 1.5 gm/day in place of lialda due to insurance cost and she continued to do well.  She had a flex sig on 8/8/2017 that showed some mild decrease in vasculature and inflammatory pseudopolyp in the rectum with an otherwise normal colon.  Biopsies showed no active inflammation.  She continues to be in clinical and endoscopic remission on Apriso 1.5 gm/day.  We will continue this for maintenance and plan to see her in 3/2018 at which time drug monitoring labs will be due.  She will need a repeat colonoscopy in 2 years unless symptoms warrant a sooner procedure.      # Ulcerative proctosigmoiditis:  - continue apriso 1.5 gm/day  - call with any worsening symptoms  - normal flex sig 8/2017, colonoscopy due 8/2019  - drug monitoring labs: Cr/UA yearly due 3/2018    # High risk colon cancer:   - risk factors:  Small serrated adenoma 8/2016, symptom onset for proctosigmoiditis 7/2016   -  colonoscopy for surveillance in 8/2021 and will eventually need colonoscopy every 1- 2 years after 12-15 years of disease starting in [id number]  - 8/8/2017 flex sig path normal, next colonoscopy due 8/2019    # Hypertension  - 156/84  - on HCTZ 12.5 mg PO daily  - seeing PCP next month    # Anxiety:   - offered psychology services and pt thinking about it and will revisit at next visit  - pt not interested in starting any long term meds    # IBD specific health maintenance--plans for immunosuppression:   Pap smear recommended every 3 years--due 10/2018  Opthamologic exam recommended yearly--due 2018    Dermatologic exam recommended yearly--due 9/2017     Bone health:  Risk of osteopenia/osteoporosis:  Risks factors: none  Vitamin D:   Lab Results   Component Value Date    [id number] 32 03/09/2017     Vaccine counseling:  - Tetanus every 10 years recommended  - Flu shot yearly recommended  - Pneumonia 13 & 23 vaccine recommended if plans on immunosuppression    Follow up: with MARK Nichols in 3/2019    PAULO GamezP-C  Department of Gastroenterology  Inflammatory Bowel Disease Program    Discussed patient and reviewed plan of care with MARK Walsh MD   Department of Gastroenterology  Medical Director, Inflammatory Bowel Disease

## 2017-08-28 NOTE — PATIENT INSTRUCTIONS
Instructions:  - continue apriso 1.5 gm/day  - repeat colonoscopy due 8/2019  - Avoid all NSAIDs (Advil, Ibuprofen, Motrin, Aspirin, Naprosyn, Aleve)  - Pap smear recommended every 3 years--due 10/2018  - Opthamologic exam recommended yearly--due 2018    - Dermatologic exam recommended yearly--due 9/2017   - Use antibiotics with caution  - Vaccines needed:  Flu shot yearly--due fall 2017  Tetanus every 10 years  - Follow up with MARK Nichols in 3/2018

## 2017-11-06 ENCOUNTER — TELEPHONE (OUTPATIENT)
Dept: GASTROENTEROLOGY | Facility: CLINIC | Age: 52
End: 2017-11-06

## 2017-11-06 DIAGNOSIS — K51.30 ULCERATIVE RECTOSIGMOIDITIS WITHOUT COMPLICATION: Primary | ICD-10-CM

## 2017-11-06 DIAGNOSIS — R10.9 ABDOMINAL CRAMPING: ICD-10-CM

## 2017-11-06 RX ORDER — DICYCLOMINE HYDROCHLORIDE 10 MG/1
10 CAPSULE ORAL 3 TIMES DAILY
Qty: 90 CAPSULE | Refills: 0 | Status: SHIPPED | OUTPATIENT
Start: 2017-11-06 | End: 2017-11-16

## 2017-11-06 NOTE — TELEPHONE ENCOUNTER
Spoke to patient re intermittent generalized cramping abdominal pain 8/10 that started last week with bloating and a lot of gas.  She is having 3 loose-applesauce BMs/day with no blood and no nocturnal BMs.  No fever, nausea, or vomiting.  No alleviating or aggravating factors.  Discuss with Dr. Walsh and will order a flex sig and Bentyl to help with the cramping.  Patient verbalizes understanding and agrees with the treatment plan.  Questions answered.    KG      ----- Message from Mike Walsh MD sent at 11/6/2017  3:22 PM CST -----  Contact: Self      ----- Message -----  From: Gayle Mantilla  Sent: 11/6/2017  10:46 AM  To: Jillian Mckeon Staff    Pt is calling to speak with Staff regarding abdominal cramps.    She can be reached at [phone].    Thank you.

## 2017-11-08 ENCOUNTER — TELEPHONE (OUTPATIENT)
Dept: GASTROENTEROLOGY | Facility: CLINIC | Age: 52
End: 2017-11-08

## 2017-11-08 DIAGNOSIS — R10.9 ABDOMINAL PAIN, UNSPECIFIED ABDOMINAL LOCATION: Primary | ICD-10-CM

## 2017-11-08 NOTE — TELEPHONE ENCOUNTER
----- Message from Jessica Vega sent at 11/8/2017  8:07 AM CST -----  Contact: pt [phone] or cell [phone]  Jillian    Pt states her medication that she takes for stomach spasm seems to not be working. Pt would like to discuss with the nurse. Please call pt.
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
## 2017-11-09 NOTE — TELEPHONE ENCOUNTER
Spoke to patient, confirmed that she is scheduled for Flex-sig, tomorrow. Patient verbalized understanding that she can hold off on CT and that a further determination of its necessity will be evaluated after the endoscopy procedure.

## 2017-11-09 NOTE — TELEPHONE ENCOUNTER
Patient will schedule for a Flex Sig tomorrow, 11/10 @ 0800.     There are only PM CT's available for today, patient states she prefers an AM appt. due to her children's schedule. I called [facility] CT and asked them if an urgent CT could be done today, Richar said yes, but that the patient would have to wait, since they would be fitting her in.     Patient is not sure if she would like to wait at [facility] or if she could perhaps have the CT tomorrow. I will discuss this with her after she schedules her Flex Sig.    Patient rates her abdominal pain at a 5/10. She has taken a Bentyl this morning. She has had 3 loose BM's this morning, no blood. Patient is going to try a heating pad on her abdomen, she believes the heat may relieve the pain.

## 2017-11-10 ENCOUNTER — ANESTHESIA (OUTPATIENT)
Dept: ENDOSCOPY | Facility: HOSPITAL | Age: 52
End: 2017-11-10
Payer: COMMERCIAL

## 2017-11-10 ENCOUNTER — HOSPITAL ENCOUNTER (OUTPATIENT)
Facility: HOSPITAL | Age: 52
Discharge: HOME | End: 2017-11-10
Attending: INTERNAL MEDICINE | Admitting: INTERNAL MEDICINE
Payer: COMMERCIAL

## 2017-11-10 ENCOUNTER — SURGERY (OUTPATIENT)
Age: 52
End: 2017-11-10

## 2017-11-10 ENCOUNTER — TELEPHONE (OUTPATIENT)
Dept: GASTROENTEROLOGY | Facility: CLINIC | Age: 52
End: 2017-11-10

## 2017-11-10 ENCOUNTER — LAB VISIT (OUTPATIENT)
Dept: LAB | Facility: HOSPITAL | Age: 52
End: 2017-11-10
Attending: INTERNAL MEDICINE
Payer: COMMERCIAL

## 2017-11-10 ENCOUNTER — ANESTHESIA EVENT (OUTPATIENT)
Dept: ENDOSCOPY | Facility: HOSPITAL | Age: 52
End: 2017-11-10
Payer: COMMERCIAL

## 2017-11-10 VITALS
SYSTOLIC BLOOD PRESSURE: 139 MMHG | HEART RATE: 68 BPM | RESPIRATION RATE: 25 BRPM | DIASTOLIC BLOOD PRESSURE: 86 MMHG | OXYGEN SATURATION: 99 % | RESPIRATION RATE: 18 BRPM

## 2017-11-10 DIAGNOSIS — K51.011 ULCERATIVE PANCOLITIS WITH RECTAL BLEEDING: ICD-10-CM

## 2017-11-10 DIAGNOSIS — R19.7 DIARRHEA, UNSPECIFIED TYPE: Primary | ICD-10-CM

## 2017-11-10 DIAGNOSIS — K51.30 ULCERATIVE PROCTOSIGMOIDITIS: ICD-10-CM

## 2017-11-10 DIAGNOSIS — R19.7 DIARRHEA, UNSPECIFIED TYPE: ICD-10-CM

## 2017-11-10 DIAGNOSIS — K51.30 ULCERATIVE RECTOSIGMOIDITIS WITHOUT COMPLICATION: Primary | ICD-10-CM

## 2017-11-10 DIAGNOSIS — K51.00 ULCERATIVE PANCOLITIS: ICD-10-CM

## 2017-11-10 LAB
ALBUMIN SERPL BCP-MCNC: 3.3 G/DL
ALP SERPL-CCNC: 91 U/L
ALT SERPL W/O P-5'-P-CCNC: 11 U/L
ANION GAP SERPL CALC-SCNC: 7 MMOL/L
AST SERPL-CCNC: 10 U/L
BASOPHILS # BLD AUTO: 0.06 K/UL
BASOPHILS NFR BLD: 0.7 %
BILIRUB SERPL-MCNC: 0.5 MG/DL
BUN SERPL-MCNC: 11 MG/DL
CALCIUM SERPL-MCNC: 8.8 MG/DL
CHLORIDE SERPL-SCNC: 106 MMOL/L
CO2 SERPL-SCNC: 27 MMOL/L
CREAT SERPL-MCNC: 0.9 MG/DL
DIFFERENTIAL METHOD: ABNORMAL
EOSINOPHIL # BLD AUTO: 0.1 K/UL
EOSINOPHIL NFR BLD: 1.5 %
ERYTHROCYTE [DISTWIDTH] IN BLOOD BY AUTOMATED COUNT: 12.7 %
EST. GFR  (AFRICAN AMERICAN): >60 ML/MIN/1.73 M^2
EST. GFR  (NON AFRICAN AMERICAN): >60 ML/MIN/1.73 M^2
GLUCOSE SERPL-MCNC: 99 MG/DL
HBV CORE AB SERPL QL IA: NEGATIVE
HBV SURFACE AB SER-ACNC: POSITIVE M[IU]/ML
HBV SURFACE AG SERPL QL IA: NEGATIVE
HCT VFR BLD AUTO: 42.2 %
HGB BLD-MCNC: 14 G/DL
IMM GRANULOCYTES # BLD AUTO: 0.11 K/UL
IMM GRANULOCYTES NFR BLD AUTO: 1.3 %
LYMPHOCYTES # BLD AUTO: 1.2 K/UL
LYMPHOCYTES NFR BLD: 14.9 %
MCH RBC QN AUTO: 28.8 PG
MCHC RBC AUTO-ENTMCNC: 33.2 G/DL
MCV RBC AUTO: 87 FL
MONOCYTES # BLD AUTO: 0.7 K/UL
MONOCYTES NFR BLD: 8.8 %
NEUTROPHILS # BLD AUTO: 6 K/UL
NEUTROPHILS NFR BLD: 72.8 %
NRBC BLD-RTO: 0 /100 WBC
PLATELET # BLD AUTO: 245 K/UL
PMV BLD AUTO: 10.9 FL
POTASSIUM SERPL-SCNC: 3.7 MMOL/L
PROT SERPL-MCNC: 7.4 G/DL
RBC # BLD AUTO: 4.86 M/UL
SODIUM SERPL-SCNC: 140 MMOL/L
WBC # BLD AUTO: 8.27 K/UL

## 2017-11-10 PROCEDURE — 27201012 HC FORCEPS, HOT/COLD, DISP: Performed by: INTERNAL MEDICINE

## 2017-11-10 PROCEDURE — 87340 HEPATITIS B SURFACE AG IA: CPT

## 2017-11-10 PROCEDURE — 63600175 PHARM REV CODE 636 W HCPCS: Performed by: NURSE ANESTHETIST, CERTIFIED REGISTERED

## 2017-11-10 PROCEDURE — 80053 COMPREHEN METABOLIC PANEL: CPT

## 2017-11-10 PROCEDURE — D9220A PRA ANESTHESIA: Mod: ANES,,, | Performed by: ANESTHESIOLOGY

## 2017-11-10 PROCEDURE — 45380 COLONOSCOPY AND BIOPSY: CPT | Mod: 52,,, | Performed by: INTERNAL MEDICINE

## 2017-11-10 PROCEDURE — 25000003 PHARM REV CODE 250: Performed by: INTERNAL MEDICINE

## 2017-11-10 PROCEDURE — 36415 COLL VENOUS BLD VENIPUNCTURE: CPT

## 2017-11-10 PROCEDURE — 86787 VARICELLA-ZOSTER ANTIBODY: CPT

## 2017-11-10 PROCEDURE — 37000009 HC ANESTHESIA EA ADD 15 MINS: Performed by: INTERNAL MEDICINE

## 2017-11-10 PROCEDURE — 85025 COMPLETE CBC W/AUTO DIFF WBC: CPT

## 2017-11-10 PROCEDURE — 45380 COLONOSCOPY AND BIOPSY: CPT | Performed by: INTERNAL MEDICINE

## 2017-11-10 PROCEDURE — 86704 HEP B CORE ANTIBODY TOTAL: CPT

## 2017-11-10 PROCEDURE — 25000003 PHARM REV CODE 250: Performed by: NURSE ANESTHETIST, CERTIFIED REGISTERED

## 2017-11-10 PROCEDURE — 37000008 HC ANESTHESIA 1ST 15 MINUTES: Performed by: INTERNAL MEDICINE

## 2017-11-10 PROCEDURE — 88305 TISSUE EXAM BY PATHOLOGIST: CPT | Mod: 26,,, | Performed by: PATHOLOGY

## 2017-11-10 PROCEDURE — 88305 TISSUE EXAM BY PATHOLOGIST: CPT | Performed by: PATHOLOGY

## 2017-11-10 PROCEDURE — D9220A PRA ANESTHESIA: Mod: CRNA,,, | Performed by: NURSE ANESTHETIST, CERTIFIED REGISTERED

## 2017-11-10 PROCEDURE — 86706 HEP B SURFACE ANTIBODY: CPT

## 2017-11-10 RX ORDER — PROPOFOL 10 MG/ML
VIAL (ML) INTRAVENOUS
Status: DISCONTINUED | OUTPATIENT
Start: 2017-11-10 | End: 2017-11-10

## 2017-11-10 RX ORDER — LIDOCAINE HCL/PF 100 MG/5ML
SYRINGE (ML) INTRAVENOUS
Status: DISCONTINUED | OUTPATIENT
Start: 2017-11-10 | End: 2017-11-10

## 2017-11-10 RX ORDER — SODIUM CHLORIDE 9 MG/ML
INJECTION, SOLUTION INTRAVENOUS CONTINUOUS
Status: DISCONTINUED | OUTPATIENT
Start: 2017-11-10 | End: 2017-11-10 | Stop reason: HOSPADM

## 2017-11-10 RX ORDER — GLYCOPYRROLATE 0.2 MG/ML
INJECTION INTRAMUSCULAR; INTRAVENOUS
Status: DISCONTINUED | OUTPATIENT
Start: 2017-11-10 | End: 2017-11-10

## 2017-11-10 RX ORDER — PREDNISONE 10 MG/1
10 TABLET ORAL DAILY
Qty: 120 TABLET | Refills: 1 | Status: SHIPPED | OUTPATIENT
Start: 2017-11-10 | End: 2017-11-16 | Stop reason: SDUPTHER

## 2017-11-10 RX ADMIN — PROPOFOL 50 MG: 10 INJECTION, EMULSION INTRAVENOUS at 08:11

## 2017-11-10 RX ADMIN — PROPOFOL 100 MG: 10 INJECTION, EMULSION INTRAVENOUS at 08:11

## 2017-11-10 RX ADMIN — SODIUM CHLORIDE: 0.9 INJECTION, SOLUTION INTRAVENOUS at 07:11

## 2017-11-10 RX ADMIN — LIDOCAINE HYDROCHLORIDE 75 MG: 20 INJECTION, SOLUTION INTRAVENOUS at 08:11

## 2017-11-10 RX ADMIN — GLYCOPYRROLATE 0.1 MG: 0.2 INJECTION, SOLUTION INTRAMUSCULAR; INTRAVENOUS at 08:11

## 2017-11-10 NOTE — TELEPHONE ENCOUNTER
Spoke to patient's , clarified that the patient is to take 40 mg of Prednisone, daily, until she returns for clinic F/U, next week. Patient's  verbalized understanding.

## 2017-11-10 NOTE — PATIENT INSTRUCTIONS
Discharge Summary/Instructions after an Endoscopic Procedure  Patient Name: Ansley Bautista  Patient MRN: [id number]  Patient YOB: 1965  Friday, November 10, 2017  Mike Walsh MD  RESTRICTIONS:  During your procedure today, you received medications for sedation.  These   medications may affect your judgment, balance and coordination.  Therefore,   for 24 hours, you have the following restrictions:   - DO NOT drive a car, operate machinery, make legal/financial decisions,   sign important papers or drink alcohol.    ACTIVITY:  The following day: return to full activity including work, except no heavy   lifting, straining or running for 3 days if polyps were removed.  DIET:  Eat and drink normally unless instructed otherwise.     TREATMENT FOR COMMON SIDE EFFECTS:  - Mild abdominal pain, belching, bloating or excessive gas: rest, eat   lightly and use a heating pad.  - Sore Throat: treat with throat lozenges and/or gargle with warm salt   water.  SYMPTOMS TO WATCH FOR AND REPORT TO YOUR PHYSICIAN:  1. Abdominal pain or bloating, other than gas cramps.  2. Chest pain.  3. Back pain.  4. Chills or fever occurring within 24 hours after the procedure.  5. Rectal bleeding, which would show as bright red, maroon, or black stools.   (A tablespoon of blood from the rectum is not serious, especially if   hemorrhoids are present.)  6. Vomiting.  7. Weakness or dizziness.  8. Because air was used during the procedure, expelling large amounts of air   from your rectum or belching is normal.  9. If a bowel prep was taken, you may not have a bowel movement for 1-3   days.  This is normal.  GO DIRECTLY TO THE NEAREST EMERGENCY ROOM IF YOU HAVE ANY OF THE FOLLOWING:      Difficulty breathing  Chills and/or fever over 101 F   Persistent vomiting and/or vomiting blood   Severe abdominal pain   Severe chest pain   Black, tarry stools   Bleeding- more than one tablespoon   Any other symptom or condition that you may feel  needs urgent attention  Your doctor recommends these additional instructions:  If any biopsies were taken, your doctor s clinic will contact you in 1 to 2   weeks with any results.  You are being discharged to home.   You have a contact number available for emergencies.  The signs and symptoms   of potential delayed complications were discussed with you.  You may return   to normal activities tomorrow.  Written discharge instructions were   provided to you.   Eat a low fiber diet.   Continue your present medications.   We are waiting for your pathology results.   Return to your GI clinic in one week.  For questions, problems or results please call your physician - Mike Walsh MD at Work:  [phone].  [facility], EMERGENCY ROOM PHONE NUMBER: [phone]  IF A COMPLICATION OR EMERGENCY SITUATION ARISES AND YOU ARE UNABLE TO REACH   YOUR PHYSICIAN - GO DIRECTLY TO THE EMERGENCY ROOM.  Mike Walsh MD  11/10/2017 8:37:47 AM  This report has been verified and signed electronically.

## 2017-11-10 NOTE — TELEPHONE ENCOUNTER
Pt is scheduled for blood work this morning at the [facility].    Pt reminded to please  Prednisone from the [facility], after her blood work is drawn, today. Pt reminded to take Prednisone dose, today. Pt and  verbalized understanding.     Pt scheduled for TB Gold blood work on 11/13/2017. Pt will turn in stool studies at that time. This is scheduled at [facility], as per patient request.    Pt is scheduled for a F/U in clinic with TANGELA Noble on 11/16/2017 @ 1040.

## 2017-11-10 NOTE — TELEPHONE ENCOUNTER
Spoke to Bar, who informed me that the patient picked up the Prednisone Rx, but only picked up 30 tablets. Patient will be able to take Prednisone 40 mg/day, until she returns for her F/U clinic appointment on 11/16/2017.    Bar cancelled the incorrect Prednisone prescription and entered a new one, with the correct daily dosage (40mg/day). When patient picks up Prednisone from the [facility] on 11/16/17, she will be picking up 90 tablets. There is still an additional refill of 120 tablets.

## 2017-11-10 NOTE — TELEPHONE ENCOUNTER
Called and spoke with pt  I explained message below and she expressed understanding  She will still turn in her stool studies on Monday

## 2017-11-10 NOTE — ANESTHESIA PREPROCEDURE EVALUATION
11/10/2017  Ansley Bautista is a 52 y.o., female.    Anesthesia Evaluation    I have reviewed the Patient Summary Reports.     I have reviewed the Medications.     Review of Systems  Anesthesia Hx:  History of prior surgery of interest to airway management or planning:  Denies Personal Hx of Anesthesia complications.   Social:  Former Smoker    Cardiovascular:   Hypertension    Hepatic/GI:   PUD, Ulcerative rectosigmoid   Psych:   anxiety          Physical Exam  General:  Well nourished    Airway/Jaw/Neck:  Airway Findings: Mouth Opening: Normal Tongue: Normal  General Airway Assessment: Adult  Mallampati: III  Improves to II with phonation.  TM Distance: Normal, at least 6 cm  Jaw/Neck Findings:  Neck ROM: Normal ROM       Chest/Lungs:  Chest/Lungs Findings: Normal Respiratory Rate     Heart/Vascular:  Heart Findings: Rate: Normal        Mental Status:  Mental Status Findings:  Alert and Oriented         Anesthesia Plan  Type of Anesthesia, risks & benefits discussed:  Anesthesia Type:  general  Patient's Preference: General   Intra-op Monitoring Plan: standard ASA monitors  Intra-op Monitoring Plan Comments:   Post Op Pain Control Plan: IV/PO Opioids PRN  Post Op Pain Control Plan Comments:   Induction:    Beta Blocker:  Patient is not currently on a Beta-Blocker (No further documentation required).       Informed Consent: Patient understands risks and agrees with Anesthesia plan.  Questions answered. Anesthesia consent signed with patient.  ASA Score: 2     Day of Surgery Review of History & Physical:    H&P update referred to the surgeon.     Anesthesia Plan Notes: NPO confirmed.           Ready For Surgery From Anesthesia Perspective.

## 2017-11-10 NOTE — TRANSFER OF CARE
Anesthesia Transfer of Care Note    Patient: Ansley Bautista    Procedure(s) Performed: Procedure(s) (LRB):  SIGMOIDOSCOPY-FLEXIBLE (N/A)    Patient location: PACU    Anesthesia Type: general    Transport from OR: Transported from OR on room air with adequate spontaneous ventilation    Post pain: adequate analgesia    Post assessment: no apparent anesthetic complications and tolerated procedure well    Post vital signs: stable    Level of consciousness: sedated    Nausea/Vomiting: no nausea/vomiting    Complications: none    Transfer of care protocol was followed      Last vitals:   Visit Vitals  Breastfeeding? No

## 2017-11-10 NOTE — ANESTHESIA POSTPROCEDURE EVALUATION
Anesthesia Post Evaluation    Patient: Ansley Bautista    Procedure(s) Performed: Procedure(s) (LRB):  SIGMOIDOSCOPY-FLEXIBLE (N/A)    Final Anesthesia Type: general  Patient location during evaluation: PACU  Patient participation: Yes- Able to Participate  Level of consciousness: awake and alert  Post-procedure vital signs: reviewed and stable  Pain management: adequate  Airway patency: patent  PONV status at discharge: No PONV  Anesthetic complications: no      Cardiovascular status: blood pressure returned to baseline  Respiratory status: unassisted  Hydration status: euvolemic  Follow-up not needed.        Visit Vitals  /86 (BP Location: Left arm, Patient Position: Lying)   Pulse 68   Resp 18   SpO2 99%   Breastfeeding? No       Pain/Starr Score: Pain Assessment Performed: Yes (11/10/2017  8:45 AM)  Presence of Pain: complains of pain/discomfort (11/10/2017  8:45 AM)  Starr Score: 9 (11/10/2017  8:40 AM)

## 2017-11-10 NOTE — TELEPHONE ENCOUNTER
----- Message from Mike Walsh MD sent at 11/10/2017 10:53 AM CST -----  Please let patient know that labs including potassium is normal.     You can cancel her blood draw on 11/13 for now for the TB test we just won't get it.     Thanks  SS  ----- Message -----  From: [facility]  Sent: 11/10/2017  10:28 AM  To: Mike Walsh MD

## 2017-11-10 NOTE — H&P
Short Stay Endoscopy History and Physical    PCP - Ciera Freeman MD  Referring Physician - Magdalena Morales, FNP  [address] [place] [address]    Procedure - Flex sig  ASA - per anesthesia  Mallampati - per anesthesia  History of Anesthesia problems - no  Family history Anesthesia problems -  no   Plan of anesthesia - General    HPI  52 y.o. female  Reason for procedure:  Ulcerative proctosigmoiditis- assess for flare      ROS:  Constitutional: No fevers, chills, No weight loss  CV: No chest pain  Pulm: No cough, No shortness of breath  GI: see HPI    Medical History:  has a past medical history of Colon polyp; Hypertension; and Ulcerative colitis.    Surgical History:  has a past surgical history that includes cyst removal off of breast; Colonoscopy; breast augumentation; Partial hysterectomy; bilateral knee scopes; and surgery on both feet.    Family History: family history includes Diabetes in her father; Diverticulitis in her mother; Heart disease in her father; Hypertension in her brother, father, and sister.. Otherwise no colon cancer, inflammatory bowel disease, or GI malignancies.    Social History:  reports that she has quit smoking. She has never used smokeless tobacco. She reports that she drinks about 0.6 - 1.2 oz of alcohol per week . She reports that she does not use drugs.    Review of patient's allergies indicates:   Allergen Reactions    Pcn [penicillins] Anaphylaxis       Medications:   Prescriptions Prior to Admission   Medication Sig Dispense Refill Last Dose    hydrochlorothiazide (HYDRODIURIL) 12.5 MG Tab once daily.   11/10/2017 at Unknown time    dicyclomine (BENTYL) 10 MG capsule Take 1 capsule (10 mg total) by mouth 3 (three) times daily. 90 capsule 0     mesalamine (APRISO) 0.375 gram Cp24 Take 4 capsules (1.5 g total) by mouth once daily. 120 capsule 5 11/8/2017       Physical Exam:    Vital Signs: There were no vitals filed for this visit.    General Appearance: Well  appearing in no acute distress  Lungs: CTA anteriorly  Heart:  Regular rate, S1, S2 normal, no murmurs heard.  Abdomen: Soft, non tender, non distended with normal bowel sounds, no masses  Extremities: No edema    Labs:  Lab Results   Component Value Date    WBC 4.56 08/08/2017    HGB 14.2 08/08/2017    HCT 42.5 08/08/2017     08/08/2017    ALT 20 08/08/2017    AST 17 08/08/2017     08/08/2017    K 4.0 08/08/2017     08/08/2017    CREATININE 0.9 08/08/2017    BUN 17 08/08/2017    CO2 29 08/08/2017       I have explained the risks and benefits of this endoscopic procedure to the patient including but not limited to bleeding, inflammation, infection, perforation, and death.      Mike Walsh MD

## 2017-11-10 NOTE — OR NURSING
Dr Walsh at bedside discussing procedure results. Dr. Walsh escorted patient to clinic to make appt with nurse. No orders recevied.

## 2017-11-10 NOTE — TELEPHONE ENCOUNTER
----- Message from Flori García sent at 11/10/2017 10:19 AM CST -----  Contact: Self- [phone]  Jillian- pt called to speak with Inessa- needs clarification on the medication instructions for Prednisone- please call pt back at [phone]

## 2017-11-13 LAB
VARICELLA INTERPRETATION: POSITIVE
VARICELLA ZOSTER IGG: 4.39 ISR

## 2017-11-14 ENCOUNTER — LAB VISIT (OUTPATIENT)
Dept: LAB | Facility: HOSPITAL | Age: 52
End: 2017-11-14
Attending: INTERNAL MEDICINE
Payer: COMMERCIAL

## 2017-11-14 ENCOUNTER — TELEPHONE (OUTPATIENT)
Dept: GASTROENTEROLOGY | Facility: CLINIC | Age: 52
End: 2017-11-14

## 2017-11-14 DIAGNOSIS — K51.011 ULCERATIVE PANCOLITIS WITH RECTAL BLEEDING: ICD-10-CM

## 2017-11-14 DIAGNOSIS — R19.7 DIARRHEA, UNSPECIFIED TYPE: ICD-10-CM

## 2017-11-14 PROCEDURE — 87046 STOOL CULTR AEROBIC BACT EA: CPT

## 2017-11-14 PROCEDURE — 87427 SHIGA-LIKE TOXIN AG IA: CPT

## 2017-11-14 PROCEDURE — 87449 NOS EACH ORGANISM AG IA: CPT

## 2017-11-14 PROCEDURE — 87045 FECES CULTURE AEROBIC BACT: CPT

## 2017-11-14 NOTE — TELEPHONE ENCOUNTER
----- Message from Susan Nieto sent at 11/14/2017  1:18 PM CST -----  Contact: self - [phone]  rena - needs refills on meds they gave her from procedure - prednisone 10mg - is supposed to have 4 daily - [facility] [place]  - please call patient at

## 2017-11-14 NOTE — TELEPHONE ENCOUNTER
Called and spoke with pt  She has enough Prednisone to last her till Thursday.  I explained when she comes on Thurs our pharmacy will have the refill ready.  She expressed understanding

## 2017-11-15 LAB
C DIFF GDH STL QL: NEGATIVE
C DIFF TOX A+B STL QL IA: NEGATIVE
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
## 2017-11-16 ENCOUNTER — OFFICE VISIT (OUTPATIENT)
Dept: GASTROENTEROLOGY | Facility: CLINIC | Age: 52
End: 2017-11-16
Payer: COMMERCIAL

## 2017-11-16 VITALS
DIASTOLIC BLOOD PRESSURE: 87 MMHG | BODY MASS INDEX: 20.85 KG/M2 | WEIGHT: 113.31 LBS | SYSTOLIC BLOOD PRESSURE: 134 MMHG | TEMPERATURE: 98 F | RESPIRATION RATE: 14 BRPM | HEART RATE: 75 BPM | HEIGHT: 62 IN

## 2017-11-16 DIAGNOSIS — R19.7 DIARRHEA, UNSPECIFIED TYPE: ICD-10-CM

## 2017-11-16 DIAGNOSIS — F41.9 ANXIETY: ICD-10-CM

## 2017-11-16 DIAGNOSIS — Z79.52 ON PREDNISONE THERAPY: ICD-10-CM

## 2017-11-16 DIAGNOSIS — I10 ESSENTIAL HYPERTENSION: ICD-10-CM

## 2017-11-16 DIAGNOSIS — Z91.89 HIGH RISK FOR COLON CANCER: ICD-10-CM

## 2017-11-16 DIAGNOSIS — K51.011 ULCERATIVE PANCOLITIS WITH RECTAL BLEEDING: Primary | ICD-10-CM

## 2017-11-16 LAB
E COLI SXT1 STL QL IA: NEGATIVE
E COLI SXT2 STL QL IA: NEGATIVE

## 2017-11-16 PROCEDURE — 99215 OFFICE O/P EST HI 40 MIN: CPT | Mod: S$GLB,,, | Performed by: NURSE PRACTITIONER

## 2017-11-16 PROCEDURE — 99999 PR PBB SHADOW E&M-EST. PATIENT-LVL IV: CPT | Mod: PBBFAC,,, | Performed by: NURSE PRACTITIONER

## 2017-11-16 RX ORDER — PREDNISONE 10 MG/1
40 TABLET ORAL DAILY
Qty: 120 TABLET | Refills: 1 | Status: ON HOLD | OUTPATIENT
Start: 2017-11-16 | End: 2017-12-01

## 2017-11-17 ENCOUNTER — TELEPHONE (OUTPATIENT)
Dept: ENDOSCOPY | Facility: HOSPITAL | Age: 52
End: 2017-11-17

## 2017-11-18 LAB — BACTERIA STL CULT: NORMAL

## 2017-11-19 ENCOUNTER — PATIENT MESSAGE (OUTPATIENT)
Dept: GASTROENTEROLOGY | Facility: CLINIC | Age: 52
End: 2017-11-19

## 2017-11-21 ENCOUNTER — LAB VISIT (OUTPATIENT)
Dept: LAB | Facility: HOSPITAL | Age: 52
End: 2017-11-21
Attending: INTERNAL MEDICINE
Payer: COMMERCIAL

## 2017-11-21 ENCOUNTER — PATIENT MESSAGE (OUTPATIENT)
Dept: GASTROENTEROLOGY | Facility: CLINIC | Age: 52
End: 2017-11-21

## 2017-11-21 ENCOUNTER — TELEPHONE (OUTPATIENT)
Dept: GASTROENTEROLOGY | Facility: CLINIC | Age: 52
End: 2017-11-21

## 2017-11-21 DIAGNOSIS — K51.011 ULCERATIVE PANCOLITIS WITH RECTAL BLEEDING: Primary | ICD-10-CM

## 2017-11-21 DIAGNOSIS — K51.011 ULCERATIVE PANCOLITIS WITH RECTAL BLEEDING: ICD-10-CM

## 2017-11-21 LAB
ALBUMIN SERPL BCP-MCNC: 3 G/DL
ALP SERPL-CCNC: 90 U/L
ALT SERPL W/O P-5'-P-CCNC: 7 U/L
ANION GAP SERPL CALC-SCNC: 9 MMOL/L
AST SERPL-CCNC: 8 U/L
BASOPHILS # BLD AUTO: 0.03 K/UL
BASOPHILS NFR BLD: 0.3 %
BILIRUB SERPL-MCNC: 0.5 MG/DL
BUN SERPL-MCNC: 19 MG/DL
CALCIUM SERPL-MCNC: 9.3 MG/DL
CHLORIDE SERPL-SCNC: 98 MMOL/L
CO2 SERPL-SCNC: 30 MMOL/L
CREAT SERPL-MCNC: 0.9 MG/DL
CRP SERPL-MCNC: 70.9 MG/L
DIFFERENTIAL METHOD: ABNORMAL
EOSINOPHIL # BLD AUTO: 0 K/UL
EOSINOPHIL NFR BLD: 0.1 %
ERYTHROCYTE [DISTWIDTH] IN BLOOD BY AUTOMATED COUNT: 12.5 %
EST. GFR  (AFRICAN AMERICAN): >60 ML/MIN/1.73 M^2
EST. GFR  (NON AFRICAN AMERICAN): >60 ML/MIN/1.73 M^2
GLUCOSE SERPL-MCNC: 116 MG/DL
HCT VFR BLD AUTO: 43.4 %
HGB BLD-MCNC: 14.3 G/DL
IMM GRANULOCYTES # BLD AUTO: 0.19 K/UL
IMM GRANULOCYTES NFR BLD AUTO: 2 %
LYMPHOCYTES # BLD AUTO: 0.8 K/UL
LYMPHOCYTES NFR BLD: 8 %
MCH RBC QN AUTO: 28.5 PG
MCHC RBC AUTO-ENTMCNC: 32.9 G/DL
MCV RBC AUTO: 87 FL
MONOCYTES # BLD AUTO: 0.4 K/UL
MONOCYTES NFR BLD: 3.8 %
NEUTROPHILS # BLD AUTO: 8.1 K/UL
NEUTROPHILS NFR BLD: 85.8 %
NRBC BLD-RTO: 0 /100 WBC
PLATELET # BLD AUTO: 373 K/UL
PMV BLD AUTO: 9.7 FL
POTASSIUM SERPL-SCNC: 3.6 MMOL/L
PROT SERPL-MCNC: 7.4 G/DL
RBC # BLD AUTO: 5.01 M/UL
SODIUM SERPL-SCNC: 137 MMOL/L
WBC # BLD AUTO: 9.45 K/UL

## 2017-11-21 PROCEDURE — 36415 COLL VENOUS BLD VENIPUNCTURE: CPT

## 2017-11-21 PROCEDURE — 85025 COMPLETE CBC W/AUTO DIFF WBC: CPT

## 2017-11-21 PROCEDURE — 80053 COMPREHEN METABOLIC PANEL: CPT

## 2017-11-21 PROCEDURE — 86140 C-REACTIVE PROTEIN: CPT

## 2017-11-21 NOTE — TELEPHONE ENCOUNTER
Pt reports:  -10 loose BM/day, plus 5-6 episodes of tenesmus.  -Afebrile.  -Occasional blood on TP upon wiping.   -5-6 Not BM's.  -Current pain level 5/10 in abdomen. Pt states when she wakes up at night to have a BM, her abdominal cramping increases to an 8-9/10.    Pt reports weakness and that she is not drinking enough fluid, she is attempting to stay better hydrated.     I discussed treatment options, which were outlined in an e-mail from Dr. ALEIDA Walsh to the patient at 0601 this morning, the patient expressed the following:  -Pt states she will go crazy if she has to wait 2 more weeks for the prednisone to work, due to the amount of pain she is in.  -Pt is not opposed to in-patient IV steroid treatment, however she would like to know how long this hospital stay will be. I explained to pt that this is a case by case basis, pt did not verbalize understanding and requested a more exact response.   -Pt expressed interest in adding a rectal foam or enema in conjunction with steroids.

## 2017-11-21 NOTE — TELEPHONE ENCOUNTER
Please let patient know the following  - schedule for labs today- CBC/CMP and CRP  - sent prescription for cortifoam at bedtime and let patient know that she should take this at bedtime and this should help with her symptoms of trips to the toilets without bowel movements. I purposely sent this to the [facility] and she can pick it up when she comes for her labs today  - ask patient to take 1-2 immodium at bedtime to see if this helps her nighttime symptoms  - if things are worse and she feels dehydrated or the pain is unbearable then she can come to ER and we can admit her.  In regards to her hospital re-emphasize that it can be anywhere between usually 3-7 days but sometimes longer depending on her response to treatment    SS

## 2017-11-21 NOTE — TELEPHONE ENCOUNTER
Spoke to pt, verified that she is coming to [facility] to have lab work done as well as  Cortifoam from the [facility] pharmacy.    Pt states she is concerned that she is dehydrated. Pt states urine is yellow and that she is not dizzy upon walking. Patient reports that the veins on hands (dorsal) are visible and plump. Patient does report weakness.    Pt advised to go to the ER, if she felt it neccessary.     I advised patient to continue to drink fluids. I advised patient to go to the ER if her abdominal pain increases or if she begins to feel dizzy or increasingly weak.    I explained to pt that the blood work she is having this afternoon will inform if she is dehydrated. Pt informed that the results are usually avaliable in [facility] after 48 hours; pt informed that Dr. Walsh's staff will contact her RE: these results. Pt verbalized understanding.

## 2017-11-21 NOTE — TELEPHONE ENCOUNTER
----- Message from Flori Ricky sent at 11/21/2017  4:05 PM CST -----  Contact: Self- [phone]  Jillian- pt called to speak with staff- says she is headed to her blood work- believes she is dehydrated wanted to know if there is something that can be done- please call pt back at [phone]

## 2017-11-22 ENCOUNTER — TELEPHONE (OUTPATIENT)
Dept: GASTROENTEROLOGY | Facility: CLINIC | Age: 52
End: 2017-11-22

## 2017-11-22 DIAGNOSIS — R11.0 NAUSEA: Primary | ICD-10-CM

## 2017-11-22 RX ORDER — ONDANSETRON 4 MG/1
4 TABLET, ORALLY DISINTEGRATING ORAL EVERY 8 HOURS PRN
Qty: 60 TABLET | Refills: 0 | Status: SHIPPED | OUTPATIENT
Start: 2017-11-22 | End: 2018-07-25

## 2017-11-22 NOTE — TELEPHONE ENCOUNTER
Attempted to contact pt to schedule her for a sooner appt. with TANGELA Reid. (next week 11/29/2017, preferably).  [id number] & [phone] call back number provided.

## 2017-11-22 NOTE — TELEPHONE ENCOUNTER
Spoke to patient today and discussed lab test results which are significant for Albumin 3.0 and CRP 70.  Patient is on oral prednisone and we added cortifoam yesterday for some distal tenesmus symptoms. She is not seeing much blood and stool is loose to soft. She is trying to hydrate. Remarks that she has nausea.     I told her if she is not feeling well that we can admit to the hospital and she says she doesn't know. Overall symptoms and labs don't suggest need for hospitalization and some more time on oral prednisone with the cortifoam may help.     Plan  - continue prednisone 40 mg daily  - continue cortifoam at bedtime  - sent in zofran for nausea  - follow-up next week for AM appt on Wednesday to determine if admission needed and response to treatment  - pt reminded to come to ER with ongoing or worsening symptoms anytime

## 2017-11-24 ENCOUNTER — TELEPHONE (OUTPATIENT)
Dept: GASTROENTEROLOGY | Facility: CLINIC | Age: 52
End: 2017-11-24

## 2017-11-24 DIAGNOSIS — K51.30: ICD-10-CM

## 2017-11-24 NOTE — TELEPHONE ENCOUNTER
Called patient today to get update on symptoms. She is feeling better and now having 4 loose to soft BMs/day with no blood. No nocturnal bowel movements.  Not taking immodium, bentyl not helping, zofran helping    Plan:  - continue prednisone 40 mg daily  - continue zofran prn  - stop immodium  - stop bentyl if not effective  - continue cortifoam qhs  - cancel 12/4 appt and next appt to be scheduled for 11/29 with Magdalena Carrasquillo at appt plan will be to repeat CRP and get TB quantiferon and TPMT in anticipation for possible immunosuppressives if she fails prednisone taper

## 2017-11-24 NOTE — TELEPHONE ENCOUNTER
Called and spoke with pt  I explained Dr Walsh stated yes she can increase her protein to try and help since she has been loosing weight   She expressed understanding

## 2017-11-24 NOTE — TELEPHONE ENCOUNTER
Called and spoke with pt  I got her appoint rescheduled to 11/29 @ 1:00 with Magdalena  She asked since she is loosing so much weight if she should increase her protein?    I told her I would send a message over and see what DR Walsh suggests

## 2017-11-27 RX ORDER — MESALAMINE 0.38 G/1
1.5 CAPSULE, EXTENDED RELEASE ORAL DAILY
Qty: 120 CAPSULE | Refills: 5 | Status: SHIPPED | OUTPATIENT
Start: 2017-11-27 | End: 2017-12-21 | Stop reason: SDUPTHER

## 2017-11-28 ENCOUNTER — OFFICE VISIT (OUTPATIENT)
Dept: GASTROENTEROLOGY | Facility: CLINIC | Age: 52
DRG: 372 | End: 2017-11-28
Payer: COMMERCIAL

## 2017-11-28 ENCOUNTER — PATIENT MESSAGE (OUTPATIENT)
Dept: GASTROENTEROLOGY | Facility: CLINIC | Age: 52
End: 2017-11-28

## 2017-11-28 ENCOUNTER — TELEPHONE (OUTPATIENT)
Dept: GASTROENTEROLOGY | Facility: CLINIC | Age: 52
End: 2017-11-28

## 2017-11-28 ENCOUNTER — HOSPITAL ENCOUNTER (INPATIENT)
Facility: HOSPITAL | Age: 52
LOS: 3 days | Discharge: HOME | DRG: 372 | End: 2017-12-01
Attending: FAMILY MEDICINE | Admitting: EMERGENCY MEDICINE
Payer: COMMERCIAL

## 2017-11-28 VITALS
HEART RATE: 80 BPM | SYSTOLIC BLOOD PRESSURE: 150 MMHG | DIASTOLIC BLOOD PRESSURE: 101 MMHG | WEIGHT: 106.5 LBS | RESPIRATION RATE: 16 BRPM | BODY MASS INDEX: 19.6 KG/M2 | TEMPERATURE: 98 F | HEIGHT: 62 IN

## 2017-11-28 DIAGNOSIS — R10.84 GENERALIZED ABDOMINAL PAIN: ICD-10-CM

## 2017-11-28 DIAGNOSIS — Z91.89 HIGH RISK FOR COLON CANCER: ICD-10-CM

## 2017-11-28 DIAGNOSIS — K51.011 ULCERATIVE PANCOLITIS WITH RECTAL BLEEDING: ICD-10-CM

## 2017-11-28 DIAGNOSIS — I10 ESSENTIAL HYPERTENSION: ICD-10-CM

## 2017-11-28 DIAGNOSIS — Z79.52 ON PREDNISONE THERAPY: ICD-10-CM

## 2017-11-28 DIAGNOSIS — K51.011 ULCERATIVE PANCOLITIS WITH RECTAL BLEEDING: Primary | ICD-10-CM

## 2017-11-28 DIAGNOSIS — K51.00 ULCERATIVE PANCOLITIS: Primary | ICD-10-CM

## 2017-11-28 DIAGNOSIS — F41.9 ANXIETY: ICD-10-CM

## 2017-11-28 DIAGNOSIS — R19.7 DIARRHEA, UNSPECIFIED TYPE: ICD-10-CM

## 2017-11-28 PROBLEM — I15.8 OTHER SECONDARY HYPERTENSION: Status: ACTIVE | Noted: 2017-11-28

## 2017-11-28 LAB — POCT GLUCOSE: 118 MG/DL (ref 70–110)

## 2017-11-28 PROCEDURE — 25000003 PHARM REV CODE 250: Performed by: FAMILY MEDICINE

## 2017-11-28 PROCEDURE — 99215 OFFICE O/P EST HI 40 MIN: CPT | Mod: S$GLB,,, | Performed by: INTERNAL MEDICINE

## 2017-11-28 PROCEDURE — 96365 THER/PROPH/DIAG IV INF INIT: CPT

## 2017-11-28 PROCEDURE — 96375 TX/PRO/DX INJ NEW DRUG ADDON: CPT

## 2017-11-28 PROCEDURE — 99285 EMERGENCY DEPT VISIT HI MDM: CPT | Mod: 25

## 2017-11-28 PROCEDURE — 87449 NOS EACH ORGANISM AG IA: CPT

## 2017-11-28 PROCEDURE — 63600175 PHARM REV CODE 636 W HCPCS: Performed by: EMERGENCY MEDICINE

## 2017-11-28 PROCEDURE — 11000001 HC ACUTE MED/SURG PRIVATE ROOM

## 2017-11-28 PROCEDURE — 99223 1ST HOSP IP/OBS HIGH 75: CPT | Mod: ,,, | Performed by: HOSPITALIST

## 2017-11-28 PROCEDURE — 99999 PR PBB SHADOW E&M-EST. PATIENT-LVL IV: CPT | Mod: PBBFAC,,, | Performed by: INTERNAL MEDICINE

## 2017-11-28 PROCEDURE — 25500020 PHARM REV CODE 255: Performed by: HOSPITALIST

## 2017-11-28 PROCEDURE — 96361 HYDRATE IV INFUSION ADD-ON: CPT

## 2017-11-28 PROCEDURE — 89055 LEUKOCYTE ASSESSMENT FECAL: CPT

## 2017-11-28 PROCEDURE — 87209 SMEAR COMPLEX STAIN: CPT

## 2017-11-28 PROCEDURE — 87493 C DIFF AMPLIFIED PROBE: CPT

## 2017-11-28 PROCEDURE — 63600175 PHARM REV CODE 636 W HCPCS: Performed by: FAMILY MEDICINE

## 2017-11-28 PROCEDURE — 99285 EMERGENCY DEPT VISIT HI MDM: CPT | Mod: ,,, | Performed by: EMERGENCY MEDICINE

## 2017-11-28 RX ORDER — HYDROCHLOROTHIAZIDE 12.5 MG/1
12.5 TABLET ORAL DAILY
Status: DISCONTINUED | OUTPATIENT
Start: 2017-11-29 | End: 2017-11-29

## 2017-11-28 RX ORDER — IBUPROFEN 200 MG
16 TABLET ORAL
Status: DISCONTINUED | OUTPATIENT
Start: 2017-11-28 | End: 2017-12-01 | Stop reason: HOSPADM

## 2017-11-28 RX ORDER — CHOLECALCIFEROL (VITAMIN D3) 25 MCG
1000 TABLET ORAL DAILY
Status: ON HOLD | COMMUNITY
End: 2018-03-14

## 2017-11-28 RX ORDER — SODIUM CHLORIDE, SODIUM LACTATE, POTASSIUM CHLORIDE, CALCIUM CHLORIDE 600; 310; 30; 20 MG/100ML; MG/100ML; MG/100ML; MG/100ML
INJECTION, SOLUTION INTRAVENOUS CONTINUOUS
Status: CANCELLED | OUTPATIENT
Start: 2017-11-28

## 2017-11-28 RX ORDER — GLUCAGON 1 MG
1 KIT INJECTION
Status: DISCONTINUED | OUTPATIENT
Start: 2017-11-28 | End: 2017-12-01 | Stop reason: HOSPADM

## 2017-11-28 RX ORDER — ACETAMINOPHEN 10 MG/ML
1000 INJECTION, SOLUTION INTRAVENOUS
Status: COMPLETED | OUTPATIENT
Start: 2017-11-28 | End: 2017-11-28

## 2017-11-28 RX ORDER — MESALAMINE 0.38 G/1
1.5 CAPSULE, EXTENDED RELEASE ORAL DAILY
Status: DISCONTINUED | OUTPATIENT
Start: 2017-11-29 | End: 2017-12-01 | Stop reason: HOSPADM

## 2017-11-28 RX ORDER — SODIUM CHLORIDE 0.9 % (FLUSH) 0.9 %
5 SYRINGE (ML) INJECTION
Status: DISCONTINUED | OUTPATIENT
Start: 2017-11-28 | End: 2017-12-01 | Stop reason: HOSPADM

## 2017-11-28 RX ORDER — ONDANSETRON 8 MG/1
8 TABLET, ORALLY DISINTEGRATING ORAL EVERY 8 HOURS PRN
Status: DISCONTINUED | OUTPATIENT
Start: 2017-11-28 | End: 2017-12-01 | Stop reason: HOSPADM

## 2017-11-28 RX ORDER — IBUPROFEN 200 MG
24 TABLET ORAL
Status: DISCONTINUED | OUTPATIENT
Start: 2017-11-28 | End: 2017-12-01 | Stop reason: HOSPADM

## 2017-11-28 RX ORDER — INSULIN ASPART 100 [IU]/ML
0-5 INJECTION, SOLUTION INTRAVENOUS; SUBCUTANEOUS EVERY 6 HOURS PRN
Status: DISCONTINUED | OUTPATIENT
Start: 2017-11-28 | End: 2017-12-01 | Stop reason: HOSPADM

## 2017-11-28 RX ORDER — ACETAMINOPHEN 500 MG
1000 TABLET ORAL EVERY 8 HOURS PRN
Status: DISCONTINUED | OUTPATIENT
Start: 2017-11-28 | End: 2017-12-01 | Stop reason: HOSPADM

## 2017-11-28 RX ORDER — MESALAMINE 0.38 G/1
1.5 CAPSULE, EXTENDED RELEASE ORAL DAILY
Status: ON HOLD | COMMUNITY
End: 2017-11-29

## 2017-11-28 RX ADMIN — METHYLPREDNISOLONE SODIUM SUCCINATE 40 MG: 40 INJECTION, POWDER, FOR SOLUTION INTRAMUSCULAR; INTRAVENOUS at 06:11

## 2017-11-28 RX ADMIN — SODIUM CHLORIDE 1000 ML: 0.9 INJECTION, SOLUTION INTRAVENOUS at 06:11

## 2017-11-28 RX ADMIN — IOHEXOL 75 ML: 350 INJECTION, SOLUTION INTRAVENOUS at 08:11

## 2017-11-28 RX ADMIN — ACETAMINOPHEN 1000 MG: 10 INJECTION, SOLUTION INTRAVENOUS at 06:11

## 2017-11-28 RX ADMIN — IOHEXOL 15 ML: 350 INJECTION, SOLUTION INTRAVENOUS at 07:11

## 2017-11-28 RX ADMIN — IOHEXOL 15 ML: 350 INJECTION, SOLUTION INTRAVENOUS at 06:11

## 2017-11-28 NOTE — ED TRIAGE NOTES
Patient states severe abdominal pain for several weeks, states her UC is getting worse, sent to ED to be admitted.
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
## 2017-11-28 NOTE — TELEPHONE ENCOUNTER
Pt called in from the ER waiting room and she wanted to know if after the CT scan if she can leave because they are at capacity and have no beds.  I told her I would go check with Dr Walsh and will call her back    Went and spoke with Dr Walsh  Called and spoke with pt and I told her we know the wait is long but Dr Walsh does not want to deviate from the plan.  She stated the CT is part of the inpatient work up and once that is done she needs to be admitted to get her pain under control.  She expressed understanding and stated that she hopes they get a bed soon

## 2017-11-28 NOTE — ED NOTES
"Patient identifiers verified and correct for Ms Bautista  C/C: Abd pain   APPEARANCE: awake and alert in NAD.  SKIN: warm, dry and intact. No breakdown or bruising.  MUSCULOSKELETAL: Patient moving all extremities spontaneously, no obvious swelling or deformities noted. Ambulates independently.  RESPIRATORY: Denies shortness of breath.Respirations unlabored.   CARDIAC: Denies CP, 2+ distal pulses; no peripheral edema  ABDOMEN: Abdomen gen all over pain, positive nausea.   : voids spontaneously, denies difficulty  Neurologic: AAO x 4; follows commands equal strength in all extremities; denies numbness/tingling. Denies dizziness Positive weakness due to "not eating"     "

## 2017-11-28 NOTE — PROVIDER PROGRESS NOTES - EMERGENCY DEPT.
Encounter Date: 11/28/2017    ED Physician Progress Notes           I, Melissa Elias, am scribing for, and in the presence of, Dr. Watson. I performed the above scribed service and the documentation accurately describes the services I performed. I attest to the accuracy of the note.     Pt is a 53 y/o female with history of irritable bowel disease who has had a flare up recently. She took prednisone this morning to try and control the flare up. She has continued to have pain, gas, bloating, and diarrhea. She was seen at the gastroenterology clinic and was referred to the ED for admission to hospital medicine, GI consult, and CT scan. She has already had admission labs done. We will start IV fluid, symptom control, IV steroids, order CT and refer to main ED pods for further disposition.

## 2017-11-28 NOTE — PATIENT INSTRUCTIONS
Instructions:  - proceed to the lab for bloodwork- make sure ER doesn't do additional blood tests and let them know that you just went to the lab  - stool studies will be collected while you are an inpatient  - CT abd/pelvis in ER  - admit to medicine for severe ulcerative colitis due to failure of oral prednisone as an outpatient  - follow up to be determined after discharge.     Infliximab (Remicade): Biologics - Anti-TNF Agent    - Mechanism of action:  Remicade blocks TNF alpha which plays a role in the inflammatory process for inflammatory bowel disease  - Labs     - needed to determine safety of starting the medication; these may have been already       drawn or will be drawn today and include TB test, hepatitis B testing, basic labs    - our office will call you once these labs are back to let you know if it is safe to start the     Remicade infusions    - Repeat labs will be drawn with every infusion to monitor for side effects    - TB test (Quantiferon Gold) will be checked yearly or sooner if needed    Remicade Dosage:  - Loading Dose: 5 mg/kg IV week 0, 2, 6 (infused over 2 hours)  - Maintenance Dose: 5 mg/kg mg IV every 8 weeks  - 2-3 hour infusion    Risks of Remicade:  Stop therapy due to adverse event= 10% (10/100)    Please call us immediately if you develop any of the below problems    Allergic reactions  - <2% (2/100) develop infusion site reactions  - RARE- hives (rash), difficulty breathing, chest pain/tightness, high or low blood pressure, swelling of the face and hands, fever or chills    Serious Infections= 3% (3/100)  fever, tiredness, flu, open sores, warm red painful skin    Blood Disorders  fever that doesn't go away, bruising, bleeding, severe paleness    Non-Hodgkins Lymphoma=0.06% (6/10,000)    Drug related lupus-like reaction= 1% (1/100)  chest discomfort or pain that does not go away, shortness of breath, joint pain, rash on the cheeks or arms that gets worse in the  sun    Psoriasis  new or worsening red scaly patches or raised bumps on the skin that are filled with pus     Case Reports Only: Multiple Sclerosis and Guillain [place] Syndrome  Numbness/weakness/tingling of your hands and feet, changes in your vision or seizures    Case Reports Only: New or worsening Congestive Heart Failure  shortness of breath, swelling in your ankles or feet, sudden weight gain    Case Reports Only: Serious Liver Injury  jaundice (yellow skin or eyes), dark brown urine, right-sided abdominal pain, fever, severe itchiness    Vaccine Counseling  No live vaccines if remicade started

## 2017-11-28 NOTE — ED NOTES
Physician at bedside, patient states she does not want any further labs than what was done this afternoon.

## 2017-11-29 ENCOUNTER — PATIENT MESSAGE (OUTPATIENT)
Dept: GASTROENTEROLOGY | Facility: CLINIC | Age: 52
End: 2017-11-29

## 2017-11-29 PROBLEM — E44.1 MILD PROTEIN-CALORIE MALNUTRITION: Status: ACTIVE | Noted: 2017-11-29

## 2017-11-29 PROBLEM — A49.8 CLOSTRIDIUM DIFFICILE INFECTION: Status: ACTIVE | Noted: 2017-11-29

## 2017-11-29 LAB
ALBUMIN SERPL BCP-MCNC: 2.4 G/DL
ALP SERPL-CCNC: 77 U/L
ALT SERPL W/O P-5'-P-CCNC: 5 U/L
ANION GAP SERPL CALC-SCNC: 9 MMOL/L
ANISOCYTOSIS BLD QL SMEAR: SLIGHT
AST SERPL-CCNC: 7 U/L
BASOPHILS # BLD AUTO: 0.06 K/UL
BASOPHILS NFR BLD: 0.6 %
BILIRUB SERPL-MCNC: 0.6 MG/DL
BUN SERPL-MCNC: 15 MG/DL
C DIFF GDH STL QL: POSITIVE
C DIFF TOX A+B STL QL IA: NEGATIVE
C DIFF TOX GENS STL QL NAA+PROBE: POSITIVE
CALCIUM SERPL-MCNC: 8.6 MG/DL
CHLORIDE SERPL-SCNC: 102 MMOL/L
CO2 SERPL-SCNC: 28 MMOL/L
CREAT SERPL-MCNC: 0.7 MG/DL
CRP SERPL-MCNC: 69.6 MG/L
DIFFERENTIAL METHOD: ABNORMAL
EOSINOPHIL # BLD AUTO: 0 K/UL
EOSINOPHIL NFR BLD: 0 %
ERYTHROCYTE [DISTWIDTH] IN BLOOD BY AUTOMATED COUNT: 12.4 %
EST. GFR  (AFRICAN AMERICAN): >60 ML/MIN/1.73 M^2
EST. GFR  (NON AFRICAN AMERICAN): >60 ML/MIN/1.73 M^2
GLUCOSE SERPL-MCNC: 117 MG/DL
HCT VFR BLD AUTO: 41.3 %
HGB BLD-MCNC: 13.5 G/DL
IMM GRANULOCYTES # BLD AUTO: 0.42 K/UL
IMM GRANULOCYTES NFR BLD AUTO: 4.5 %
LYMPHOCYTES # BLD AUTO: 1.2 K/UL
LYMPHOCYTES NFR BLD: 12.9 %
MAGNESIUM SERPL-MCNC: 2.2 MG/DL
MCH RBC QN AUTO: 27.3 PG
MCHC RBC AUTO-ENTMCNC: 32.7 G/DL
MCV RBC AUTO: 84 FL
MONOCYTES # BLD AUTO: 0.7 K/UL
MONOCYTES NFR BLD: 7.8 %
NEUTROPHILS # BLD AUTO: 7 K/UL
NEUTROPHILS NFR BLD: 74.2 %
NRBC BLD-RTO: 0 /100 WBC
PHOSPHATE SERPL-MCNC: 3.2 MG/DL
PLATELET # BLD AUTO: 395 K/UL
PLATELET BLD QL SMEAR: ABNORMAL
PMV BLD AUTO: 9.4 FL
POCT GLUCOSE: 107 MG/DL (ref 70–110)
POCT GLUCOSE: 132 MG/DL (ref 70–110)
POCT GLUCOSE: 90 MG/DL (ref 70–110)
POCT GLUCOSE: 93 MG/DL (ref 70–110)
POTASSIUM SERPL-SCNC: 4 MMOL/L
PROT SERPL-MCNC: 6.4 G/DL
RBC # BLD AUTO: 4.94 M/UL
SODIUM SERPL-SCNC: 139 MMOL/L
WBC # BLD AUTO: 9.38 K/UL
WBC #/AREA STL HPF: NORMAL /[HPF]

## 2017-11-29 PROCEDURE — 63600175 PHARM REV CODE 636 W HCPCS: Performed by: STUDENT IN AN ORGANIZED HEALTH CARE EDUCATION/TRAINING PROGRAM

## 2017-11-29 PROCEDURE — 84100 ASSAY OF PHOSPHORUS: CPT

## 2017-11-29 PROCEDURE — 11000001 HC ACUTE MED/SURG PRIVATE ROOM

## 2017-11-29 PROCEDURE — 36415 COLL VENOUS BLD VENIPUNCTURE: CPT

## 2017-11-29 PROCEDURE — 83735 ASSAY OF MAGNESIUM: CPT

## 2017-11-29 PROCEDURE — 25000003 PHARM REV CODE 250

## 2017-11-29 PROCEDURE — 25000003 PHARM REV CODE 250: Performed by: HOSPITALIST

## 2017-11-29 PROCEDURE — 25000003 PHARM REV CODE 250: Performed by: STUDENT IN AN ORGANIZED HEALTH CARE EDUCATION/TRAINING PROGRAM

## 2017-11-29 PROCEDURE — 85025 COMPLETE CBC W/AUTO DIFF WBC: CPT

## 2017-11-29 PROCEDURE — 99254 IP/OBS CNSLTJ NEW/EST MOD 60: CPT | Mod: ,,, | Performed by: INTERNAL MEDICINE

## 2017-11-29 PROCEDURE — 80053 COMPREHEN METABOLIC PANEL: CPT

## 2017-11-29 PROCEDURE — 81479 UNLISTED MOLECULAR PATHOLOGY: CPT

## 2017-11-29 PROCEDURE — 86140 C-REACTIVE PROTEIN: CPT

## 2017-11-29 RX ORDER — METRONIDAZOLE 500 MG/1
500 TABLET ORAL EVERY 8 HOURS
Status: DISCONTINUED | OUTPATIENT
Start: 2017-11-29 | End: 2017-11-29

## 2017-11-29 RX ORDER — AMLODIPINE BESYLATE 5 MG/1
5 TABLET ORAL DAILY
Status: DISCONTINUED | OUTPATIENT
Start: 2017-11-29 | End: 2017-12-01 | Stop reason: HOSPADM

## 2017-11-29 RX ORDER — TRAMADOL HYDROCHLORIDE 50 MG/1
50 TABLET ORAL EVERY 6 HOURS PRN
Status: DISCONTINUED | OUTPATIENT
Start: 2017-11-29 | End: 2017-12-01 | Stop reason: HOSPADM

## 2017-11-29 RX ADMIN — ONDANSETRON 8 MG: 8 TABLET, ORALLY DISINTEGRATING ORAL at 03:11

## 2017-11-29 RX ADMIN — Medication 125 MG: at 11:11

## 2017-11-29 RX ADMIN — AMLODIPINE BESYLATE 5 MG: 5 TABLET ORAL at 11:11

## 2017-11-29 RX ADMIN — ONDANSETRON 8 MG: 8 TABLET, ORALLY DISINTEGRATING ORAL at 11:11

## 2017-11-29 RX ADMIN — MESALAMINE 1.5 G: 0.38 CAPSULE, EXTENDED RELEASE ORAL at 03:11

## 2017-11-29 RX ADMIN — HYDROCHLOROTHIAZIDE 12.5 MG: 12.5 TABLET ORAL at 09:11

## 2017-11-29 RX ADMIN — ACETAMINOPHEN 1000 MG: 500 TABLET ORAL at 05:11

## 2017-11-29 RX ADMIN — SODIUM CHLORIDE: 900 INJECTION, SOLUTION INTRAVENOUS at 11:11

## 2017-11-29 NOTE — TREATMENT PLAN
GI Treatment Plan  11/28/2017  8:32 PM    Consult acknowledged and I reached out to primary team.  At this point we want to make sure patient gets CT scan ASAP. In speaking to primary team she is already in the CT scanner therefore should have results overnight. Furthermore recommend holding off on Solumederol pending CT scan findings as well as C diff. Upon chart review, I do see the she received Solumderol X 1 in the Ed but as stated above please hold off on any additional doses.  Will see patient first thing in the morning and staff with IBD attending.  If there are additional questions overnight please contact GI fellow.    Alhaji Rae M.D.  Gastroenterology Fellow, PGY-IV  Pager: [phone]  [facility]

## 2017-11-29 NOTE — ASSESSMENT & PLAN NOTE
History of essential hypertension with acute worsening which could be related to pain and steroids    Recommendations:  -Management per primary team
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
## 2017-11-29 NOTE — SUBJECTIVE & OBJECTIVE
"Interval History: Feels "alright," states that she is hungry. Complains of pain related to gas/flatus. States that she had loose BMs at 0300 but they were non-bloody    Review of Systems   Constitutional: Positive for activity change, appetite change and fatigue. Negative for chills and fever.   HENT: Negative for trouble swallowing.    Eyes: Negative.    Respiratory: Negative.    Cardiovascular: Negative.    Gastrointestinal: Positive for abdominal pain, diarrhea and nausea. Negative for abdominal distention, anal bleeding, blood in stool, constipation, rectal pain and vomiting.   Endocrine: Negative.    Genitourinary: Negative.    Musculoskeletal: Negative.    Hematological: Negative.      Objective:     Vital Signs (Most Recent):  Temp: 97.1 °F (36.2 °C) (11/29/17 1627)  Pulse: 73 (11/29/17 1627)  Resp: 20 (11/29/17 1627)  BP: 133/83 (11/29/17 1627)  SpO2: 95 % (11/29/17 1627) Vital Signs (24h Range):  Temp:  [96.3 °F (35.7 °C)-98.9 °F (37.2 °C)] 97.1 °F (36.2 °C)  Pulse:  [62-73] 73  Resp:  [16-20] 20  SpO2:  [95 %-100 %] 95 %  BP: (133-193)/() 133/83     Weight: 49 kg (108 lb)  Body mass index is 19.75 kg/m².    Intake/Output Summary (Last 24 hours) at 11/29/17 1706  Last data filed at 11/29/17 0600   Gross per 24 hour   Intake             2020 ml   Output                0 ml   Net             2020 ml      Physical Exam   Constitutional: She is oriented to person, place, and time. No distress.   HENT:   Head: Normocephalic and atraumatic.   Eyes: No scleral icterus.   Neck: Normal range of motion.   Cardiovascular: Normal rate and regular rhythm.    Pulmonary/Chest: Effort normal and breath sounds normal.   Abdominal: Soft. Bowel sounds are normal. She exhibits no distension and no mass. There is no rebound and no guarding. No hernia.   Diffusely tender on deep palpation   Musculoskeletal: Normal range of motion. She exhibits no edema.   Neurological: She is alert and oriented to person, place, and time. "   Skin: She is not diaphoretic.       Significant Labs:   CBC:   Recent Labs  Lab 11/28/17  1422 11/29/17  0421   WBC 9.14 9.38   HGB 14.5 13.5   HCT 44.3 41.3   * 395*     CMP:   Recent Labs  Lab 11/28/17  1422 11/29/17  0421    139   K 3.4* 4.0   CL 98 102   CO2 31* 28   * 117*   BUN 20 15   CREATININE 0.9 0.7   CALCIUM 9.1 8.6*   PROT 7.4 6.4   ALBUMIN 2.8* 2.4*   BILITOT 0.4 0.6   ALKPHOS 79 77   AST 8* 7*   ALT 9* 5*   ANIONGAP 10 9   EGFRNONAA >60.0 >60.0     Prealbumin:   Recent Labs  Lab 11/28/17  1422   PREALBUMIN 14*     Clostridium difficile  - antigen negative, toxin and PCR positive

## 2017-11-29 NOTE — HOSPITAL COURSE
Pt found to be C Diff positive, non-bloody diarrhea with pain with gas continues. She was initially on IV methylprednisolone as well as mesalamine; PPN + diet started.  However Cdif turned positive and she was changed from IV steroids to PO.  Flagyl was changed to oral vanc per GI recommendations; discharged 12/01 with PO vancomycin 125 q6h x 14 days total. Steroid taper initiated 35 mg PO daily x 1 week then 30 mg PO until GI f/u during the week of 12/11, per GI recommendations.

## 2017-11-29 NOTE — TREATMENT PLAN
GI Treatment Plan  11/29/2017  8:13 AM    Patient seen and examined with IBD attending.    Recommendations:  -Advance diet to a full liquid diet  -Initiate PPN for additional nutrition (patient OK with starting a 2nd Iv)  -Continue Apriso (already on MAR)  -Start continuous infusion of steroids (GI fellow will write for this order)  -Re-check CRP in 72 hours from initiation of steroids  -Calorie count with nutrition consult  -Will follow in process labs: C diff, O/P, Hep A IgG, HIV, Hep C Antibody, Quant, TPMT, and Calprotectic  -Full consult note to follow.  -If there are any questions please don't hesitate to contact GI fellow.    Alhaji Rae M.D.  Gastroenterology Fellow, PGY-IV  Pager: [phone]  [facility]
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
## 2017-11-29 NOTE — ASSESSMENT & PLAN NOTE
· Currently not on any therapy, likely worsening with steroids as patient with noted insomnia and agitation at home  · Day team to inquire if patient would like medication, but previously did not want

## 2017-11-29 NOTE — SUBJECTIVE & OBJECTIVE
Past Medical History:   Diagnosis Date    Colon polyp     Hypertension     Ulcerative colitis        Past Surgical History:   Procedure Laterality Date    bilateral knee scopes      breast augumentation      COLONOSCOPY      cyst removal off of breast      PARTIAL HYSTERECTOMY      surgery on both feet         Review of patient's allergies indicates:   Allergen Reactions    Pcn [penicillins] Anaphylaxis    Zithromax [azithromycin] Rash     Family History     Problem Relation (Age of Onset)    Diabetes Father    Diverticulitis Mother    Heart disease Father    Hypertension Father, Sister, Brother        Social History Main Topics    Smoking status: Former Smoker    Smokeless tobacco: Never Used      Comment: quit around 2003; smoked when drank, not daily    Alcohol use 0.6 - 1.2 oz/week     1 - 2 Glasses of wine per week      Comment: 1-2 in a month    Drug use: No    Sexual activity: Yes     Review of Systems   Constitutional: Positive for activity change, appetite change and fatigue. Negative for chills and fever.   HENT: Negative for trouble swallowing.    Eyes: Negative.    Respiratory: Negative.    Cardiovascular: Negative.    Gastrointestinal: Positive for abdominal pain, diarrhea and nausea. Negative for abdominal distention, anal bleeding, blood in stool, constipation, rectal pain and vomiting.   Endocrine: Negative.    Genitourinary: Negative.    Musculoskeletal: Negative.    Hematological: Negative.      Objective:     Vital Signs (Most Recent):  Temp: 97.3 °F (36.3 °C) (11/29/17 1149)  Pulse: 66 (11/29/17 1149)  Resp: 20 (11/29/17 1149)  BP: (!) 159/85 (11/29/17 1149)  SpO2: 96 % (11/29/17 1149) Vital Signs (24h Range):  Temp:  [96.3 °F (35.7 °C)-98.9 °F (37.2 °C)] 97.3 °F (36.3 °C)  Pulse:  [62-72] 66  Resp:  [16-20] 20  SpO2:  [96 %-100 %] 96 %  BP: (142-193)/() 159/85     Weight: 49 kg (108 lb) (11/28/17 2222)  Body mass index is 19.75 kg/m².      Intake/Output Summary (Last 24  hours) at 11/29/17 1554  Last data filed at 11/29/17 0600   Gross per 24 hour   Intake             2020 ml   Output                0 ml   Net             2020 ml       Lines/Drains/Airways     Peripheral Intravenous Line                 Peripheral IV - Single Lumen 11/28/17 1753 Left Antecubital less than 1 day         Peripheral IV - Single Lumen 11/29/17 0931 Right Forearm less than 1 day                Physical Exam   Constitutional: She is oriented to person, place, and time. No distress.   HENT:   Head: Normocephalic and atraumatic.   Eyes: No scleral icterus.   Neck: Normal range of motion.   Cardiovascular: Normal rate and regular rhythm.    Pulmonary/Chest: Effort normal and breath sounds normal.   Abdominal: Soft. Bowel sounds are normal. She exhibits no distension and no mass. There is no rebound and no guarding. No hernia.   Diffusely tender on deep palpation, based on exam yesterday in clinic this was an improvement   Musculoskeletal: Normal range of motion. She exhibits no edema.   Neurological: She is alert and oriented to person, place, and time.   Skin: She is not diaphoretic.       Significant Labs:  CBC:   Recent Labs  Lab 11/28/17  1422 11/29/17  0421   WBC 9.14 9.38   HGB 14.5 13.5   HCT 44.3 41.3   * 395*     CMP:   Recent Labs  Lab 11/29/17  0421   *   CALCIUM 8.6*   ALBUMIN 2.4*   PROT 6.4      K 4.0   CO2 28      BUN 15   CREATININE 0.7   ALKPHOS 77   ALT 5*   AST 7*   BILITOT 0.6     Coagulation: No results for input(s): INR, APTT in the last 48 hours.    Invalid input(s): PT    Significant Imaging:  Imaging results within the past 24 hours have been reviewed.
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
## 2017-11-29 NOTE — ASSESSMENT & PLAN NOTE
With recent worsening abdominal pain as well as diarrhea patient has lost ~ 7 lbs in 2 weeks. Upon admission both pre-albumin and albumin were low and this situation patient would benefit from parenteral nutrition in addition to her current PO intake. At this point do not for see patient requiring long term TPN.    Recommendations:  -Full liquid diet today and advance to GI soft if patient does well today  -Initiate PPN for additional nutrition support  -Calorie count as well as nutrition consul to optimize nutritional status

## 2017-11-29 NOTE — ASSESSMENT & PLAN NOTE
GI consulted, see note for full recs  - Solumedrol drip + mesalamine  - For pain: tylenol + tramadol, avoid opiates in UC

## 2017-11-29 NOTE — ASSESSMENT & PLAN NOTE
· Ansley Bautista is a 52 y.o. lady with UC who presents for abdominal pain.  Differential includes UC flare vs infectious colitis vs toxic megacolon.  Overall, patient with symptoms of UC flare (diffuse bloody diarrhea with abdominal pain) with recent colonoscopy and biopsies revealing pan-colitis.  Overall, patient with noted improvement with rectal steroids, but given worsening abdominal pain, patient may need intravenous steroids if current situation truly a UC flare.  Overall, patient will need work up to rule out C. Diff infection vs toxic megacolon given acute worsening.  Will order C. Diff EIA and place patient on contact precaution.  Will also add stool WBC and ova,parasites.  Overall, given lack of fevers, leukocytosis, it is possible she is C. Diff negative.  Patient also able to ambulate with examination not concerning for acute abdomen, megacolon is less likely.  However, will still obtain and follow up STAT CT abdomen.  If concerning for megacolon or perforation, will consult CRS for surgical eval  · Patient given solumedrol 40 mg IV once in ED.  Will hold off additional steroids for now and rule out issues noted above.  Spoke with GI on call, who noted agreement.  Will place consult, GI to assess patient tomorrow.  If steroids are indicated, was informed to start methylprednisolone gtt at 40 mg a day  · Per primary GI note, will hold off on significant pain medication.  Will have tylenol PRN, hold off on morphine unless absolutely needed  · CBC, CMP daily, will trend CRP for 3 days to assess if patient may need Remicade.  In addition, will order TPMT genotyping for tomorrow AM draws per GI request  · Given steroids, will have LDSSI with Q6 accuchecks   · Patient also likely to be discharged on PO steroid taper, will inquire with GI regarding such once patient with noted improvement

## 2017-11-29 NOTE — SUBJECTIVE & OBJECTIVE
Past Medical History:   Diagnosis Date    Colon polyp     Hypertension     Ulcerative colitis        Past Surgical History:   Procedure Laterality Date    bilateral knee scopes      breast augumentation      COLONOSCOPY      cyst removal off of breast      PARTIAL HYSTERECTOMY      surgery on both feet         Review of patient's allergies indicates:   Allergen Reactions    Pcn [penicillins] Anaphylaxis    Zithromax [azithromycin] Rash       No current facility-administered medications on file prior to encounter.      Current Outpatient Prescriptions on File Prior to Encounter   Medication Sig    hydrochlorothiazide (HYDRODIURIL) 12.5 MG Tab once daily.    hydrocortisone (CORTIFOAM) 10 % (80 mg) rectal foam Place 1 applicator rectally every evening.    predniSONE (DELTASONE) 10 MG tablet Take 4 tablets (40 mg total) by mouth once daily.    vitamin D (VITAMIN D3) 1000 units Tab Take 1,000 Units by mouth once daily.    mesalamine (APRISO) 0.375 gram Cp24 Take 4 capsules (1.5 g total) by mouth once daily.    ondansetron (ZOFRAN-ODT) 4 MG TbDL Take 1 tablet (4 mg total) by mouth every 8 (eight) hours as needed (nausea).     Family History     Problem Relation (Age of Onset)    Diabetes Father    Diverticulitis Mother    Heart disease Father    Hypertension Father, Sister, Brother        Social History Main Topics    Smoking status: Former Smoker    Smokeless tobacco: Never Used      Comment: quit around 2003; smoked when drank, not daily    Alcohol use 0.6 - 1.2 oz/week     1 - 2 Glasses of wine per week      Comment: 1-2 in a month    Drug use: No    Sexual activity: Yes     Review of Systems   Constitutional: Positive for fatigue. Negative for chills, diaphoresis and fever.   Respiratory: Negative for cough, shortness of breath and wheezing.    Cardiovascular: Negative for chest pain, palpitations and leg swelling.   Gastrointestinal: Positive for abdominal pain, blood in stool, diarrhea and  nausea. Negative for abdominal distention, constipation and vomiting.   Genitourinary: Negative for dysuria and frequency.   Musculoskeletal: Negative for arthralgias and myalgias.   Skin: Negative for pallor and rash.   Neurological: Negative for weakness, light-headedness and headaches.   Hematological: Negative for adenopathy. Does not bruise/bleed easily.   Psychiatric/Behavioral: Negative for confusion and decreased concentration.     Objective:     Vital Signs (Most Recent):  Temp: 97.9 °F (36.6 °C) (11/28/17 1950)  Pulse: 71 (11/28/17 1950)  Resp: 18 (11/28/17 1950)  BP: (!) 153/90 (11/28/17 1950)  SpO2: 100 % (11/28/17 1950) Vital Signs (24h Range):  Temp:  [97.7 °F (36.5 °C)-98.2 °F (36.8 °C)] 97.9 °F (36.6 °C)  Pulse:  [62-84] 71  Resp:  [16-18] 18  SpO2:  [96 %-100 %] 100 %  BP: (150-181)/() 153/90     Weight: 48.9 kg (107 lb 12.9 oz)  Body mass index is 19.72 kg/m².    Physical Exam   Constitutional: She appears well-developed and well-nourished. No distress.   Patient able to sit on bed, looking distressed.  Alert and oriented x 4, appropriate    HENT:   Head: Normocephalic and atraumatic.   Mouth/Throat: No oropharyngeal exudate.   No oral thrush   Eyes: Pupils are equal, round, and reactive to light.   Neck: Normal range of motion.   Cardiovascular: Normal rate, regular rhythm and normal heart sounds.    No murmur heard.  Pulmonary/Chest: Effort normal and breath sounds normal. No respiratory distress. She has no wheezes. She has no rales.   Abdominal: Soft. Bowel sounds are normal. She exhibits no distension and no mass. There is tenderness (diffuse tenderness). There is no rebound and no guarding.   Musculoskeletal: Normal range of motion. She exhibits no edema or tenderness.   No joint tenderness to palpation/manipulation, no erythema, or effusions    Skin: Skin is warm and dry. She is not diaphoretic. No erythema.   Psychiatric: She has a normal mood and affect. Her behavior is normal.    Vitals reviewed.        CRANIAL NERVES     CN III, IV, VI   Pupils are equal, round, and reactive to light.       Significant Labs:     Recent Results (from the past 24 hour(s))   CBC auto differential    Collection Time: 11/28/17  2:22 PM   Result Value Ref Range    WBC 9.14 3.90 - 12.70 K/uL    RBC 5.12 4.00 - 5.40 M/uL    Hemoglobin 14.5 12.0 - 16.0 g/dL    Hematocrit 44.3 37.0 - 48.5 %    MCV 87 82 - 98 fL    MCH 28.3 27.0 - 31.0 pg    MCHC 32.7 32.0 - 36.0 g/dL    RDW 12.4 11.5 - 14.5 %    Platelets 387 (H) 150 - 350 K/uL    MPV 9.7 9.2 - 12.9 fL    Immature Granulocytes CANCELED 0.0 - 0.5 %    Immature Grans (Abs) CANCELED 0.00 - 0.04 K/uL    Lymph # CANCELED 1.0 - 4.8 K/uL    Mono # CANCELED 0.3 - 1.0 K/uL    Eos # CANCELED 0.0 - 0.5 K/uL    Baso # CANCELED 0.00 - 0.20 K/uL    nRBC 0 0 /100 WBC    Gran% 81.0 (H) 38.0 - 73.0 %    Lymph% 7.0 (L) 18.0 - 48.0 %    Mono% 4.0 4.0 - 15.0 %    Eosinophil% 1.0 0.0 - 8.0 %    Basophil% 0.0 0.0 - 1.9 %    Bands 7.0 %    Differential Method Manual    C-reactive protein    Collection Time: 11/28/17  2:22 PM   Result Value Ref Range    CRP 92.8 (H) 0.0 - 8.2 mg/L   PREALBUMIN    Collection Time: 11/28/17  2:22 PM   Result Value Ref Range    Prealbumin 14 (L) 20 - 43 mg/dL   Comprehensive metabolic panel    Collection Time: 11/28/17  2:22 PM   Result Value Ref Range    Sodium 139 136 - 145 mmol/L    Potassium 3.4 (L) 3.5 - 5.1 mmol/L    Chloride 98 95 - 110 mmol/L    CO2 31 (H) 23 - 29 mmol/L    Glucose 114 (H) 70 - 110 mg/dL    BUN, Bld 20 6 - 20 mg/dL    Creatinine 0.9 0.5 - 1.4 mg/dL    Calcium 9.1 8.7 - 10.5 mg/dL    Total Protein 7.4 6.0 - 8.4 g/dL    Albumin 2.8 (L) 3.5 - 5.2 g/dL    Total Bilirubin 0.4 0.1 - 1.0 mg/dL    Alkaline Phosphatase 79 55 - 135 U/L    AST 8 (L) 10 - 40 U/L    ALT 9 (L) 10 - 44 U/L    Anion Gap 10 8 - 16 mmol/L    eGFR if African American >60.0 >60 mL/min/1.73 m^2    eGFR if non African American >60.0 >60 mL/min/1.73 m^2         Significant  Imaging: I have reviewed all pertinent imaging results/findings within the past 24 hours.     CT on 11/28/2017 pending.

## 2017-11-29 NOTE — PLAN OF CARE
Extended Emergency Contact Information  Primary Emergency Contact: Antonio Bautista  Address: [address] [address] [place] [facility]  Home Phone: [phone]  Work Phone: [phone]  Mobile Phone: [phone]  Relation: Spouse  Preferred language: English    Primary Doctor No    No future appointments.    Payor: [employer]ET[employer] / Plan: [employer] OPEN CHOICE / Product Type: PPO /       [id number] [address] - [id number] [address] [facility] [address] [address] [address]  [place] [address]  Phone: [phone] Fax: [phone]       11/29/17 1628   Discharge Assessment   Assessment Type Discharge Planning Assessment   Confirmed/corrected address and phone number on facesheet? Yes   Assessment information obtained from? Patient;Medical Record   Expected Length of Stay (days) 3   Communicated expected length of stay with patient/caregiver yes   Prior to hospitilization cognitive status: Alert/Oriented   Prior to hospitalization functional status: Independent   Current cognitive status: Alert/Oriented   Current Functional Status: Independent   Lives With spouse   Able to Return to Prior Arrangements yes   Is patient able to care for self after discharge? Yes   Patient's perception of discharge disposition home or selfcare   Readmission Within The Last 30 Days no previous admission in last 30 days   Patient currently being followed by outpatient case management? No   Patient currently receives any other outside agency services? No   Equipment Currently Used at Home none   Do you have any problems affording any of your prescribed medications? No   Is the patient taking medications as prescribed? yes   Does the patient have transportation home? No   Does the patient receive services at the Coumadin Clinic? No   Discharge Plan A Home   Discharge Plan B Home   Patient/Family In Agreement With Plan yes

## 2017-11-29 NOTE — TELEPHONE ENCOUNTER
Spoke to Charge on 9th floor, who informed me that pt is on a Liquid diet, which was advanced from Clear Liquid Diet this morning. Pt's has not requested any antiemetic medications today.

## 2017-11-29 NOTE — PHARMACY MED REC
"MedMined Medication Reconciliation  Template    Admission Medication Reconciliation - Pharmacy Consult Note    The home medication history was taken by Scarlet Benavides, Pharmacy Technician.  Based on information gathered and subsequent review by the clinical pharmacist, the items below may need attention.    You may go to "Admission" then "Reconcile Home Medications" tabs to review and/or act upon these items.    No issues noted with the medication reconciliation.    Please address this information as you see fit.  Feel free to contact us if you have any questions or require assistance.    Estephania Reynoso, PharmD  [id number]     Patient was admitted on 11/28/2017 for Ulcerative pancolitis with rectal bleeding.    Patient's prior to admission medication regimen was as follows:  Medication Sig    hydrochlorothiazide (HYDRODIURIL) 12.5 MG Tab Take 12.5 mg by mouth once daily.     hydrocortisone (CORTIFOAM) 10 % (80 mg) rectal foam Place 1 applicator rectally every evening.    mesalamine (APRISO) 0.375 gram Cp24 Take 4 capsules (1.5 g total) by mouth once daily.    ondansetron (ZOFRAN-ODT) 4 MG TbDL Take 1 tablet (4 mg total) by mouth every 8 (eight) hours as needed (nausea).    predniSONE (DELTASONE) 10 MG tablet Take 4 tablets (40 mg total) by mouth once daily.    vitamin D (VITAMIN D3) 1000 units Tab Take 1,000 Units by mouth once daily.         Please add appropriate    SmartPhrase below:        "

## 2017-11-29 NOTE — ED PROVIDER NOTES
Encounter Date: 11/28/2017    SCRIBE #1 NOTE: I, Srinivas Salazar, am scribing for, and in the presence of,  Dr. Shi . I have scribed the entire note.       History     Chief Complaint   Patient presents with    Abdominal Pain     sent from gi clinic for admission, needing ct scan     Time patient was seen by the provider: 6:11 PM    The patient is a 52 y.o. female with hx of: ulcerative colitis, HTN, and colon polyp that presents to the ED after being sent from the GI clinic for admission. Pt states she has been having abdominal pain, weakness, and a decreased appetite. She denies bloody stool.         The history is provided by the patient.     Review of patient's allergies indicates:   Allergen Reactions    Pcn [penicillins] Anaphylaxis    Zithromax [azithromycin] Rash     Past Medical History:   Diagnosis Date    Colon polyp     Hypertension     Ulcerative colitis      Past Surgical History:   Procedure Laterality Date    bilateral knee scopes      breast augumentation      COLONOSCOPY      cyst removal off of breast      PARTIAL HYSTERECTOMY      surgery on both feet       Family History   Problem Relation Age of Onset    Diverticulitis Mother     Hypertension Father     Diabetes Father     Heart disease Father     Hypertension Sister     Hypertension Brother     Celiac disease Neg Hx     Cirrhosis Neg Hx     Colon cancer Neg Hx     Colon polyps Neg Hx     Crohn's disease Neg Hx     Cystic fibrosis Neg Hx     Esophageal cancer Neg Hx     Inflammatory bowel disease Neg Hx     Hemochromatosis Neg Hx     Irritable bowel syndrome Neg Hx     Liver cancer Neg Hx     Liver disease Neg Hx     Rectal cancer Neg Hx     Stomach cancer Neg Hx     Ulcerative colitis Neg Hx     Elpidio's disease Neg Hx      Social History   Substance Use Topics    Smoking status: Former Smoker    Smokeless tobacco: Never Used      Comment: quit around 2003; smoked when drank, not daily    Alcohol use  0.6 - 1.2 oz/week     1 - 2 Glasses of wine per week      Comment: 1-2 in a month     Review of Systems   Constitutional: Positive for appetite change (Decreased ). Negative for fever.   HENT: Negative for sore throat.    Respiratory: Negative for shortness of breath.    Cardiovascular: Negative for chest pain.   Gastrointestinal: Positive for abdominal pain. Negative for nausea.   Genitourinary: Negative for dysuria.   Musculoskeletal: Negative for back pain.   Skin: Negative for rash.   Neurological: Positive for weakness.   Hematological: Does not bruise/bleed easily.       Physical Exam     Initial Vitals [11/28/17 1442]   BP Pulse Resp Temp SpO2   (!) 159/90 77 18 97.7 °F (36.5 °C) 96 %      MAP       113         Physical Exam    Nursing note and vitals reviewed.  Constitutional: She appears well-developed and well-nourished. She is not diaphoretic. No distress.   HENT:   Head: Normocephalic and atraumatic.   Mouth/Throat: Oropharynx is clear and moist.   Neck: Normal range of motion. Neck supple. No JVD present.   Cardiovascular: Normal rate, regular rhythm, normal heart sounds and intact distal pulses.   Pulmonary/Chest: Breath sounds normal. No respiratory distress. She has no wheezes. She has no rhonchi. She has no rales.   Abdominal: Soft. She exhibits no distension. There is tenderness (Diffuse ). There is no rebound and no guarding.   Musculoskeletal: Normal range of motion. She exhibits no edema.   Lymphadenopathy:     She has no cervical adenopathy.   Neurological: She is alert and oriented to person, place, and time. She has normal strength. No cranial nerve deficit or sensory deficit.   Skin: Skin is warm and dry.         ED Course   Procedures  Labs Reviewed   CLOSTRIDIUM DIFFICILE   WBC, STOOL   STOOL EXAM-OVA,CYSTS,PARASITES   POCT GLUCOSE MONITORING CONTINUOUS             Medical Decision Making:   History:   Old Medical Records: I decided to obtain old medical records.  Initial Assessment:   52  y.o. female was sent in from the GI clinic for ulcerative pancolitis. They requested a CT in the ED which I have ordered. I will control pain without narcotics per GI request. Plan and will discuss with hospital medicine.             Scribe Attestation:   Scribe #1: I performed the above scribed service and the documentation accurately describes the services I performed. I attest to the accuracy of the note.            ED Course      Clinical Impression:   The encounter diagnosis was Ulcerative pancolitis.                           Hector Shi MD  11/28/17 2037

## 2017-11-29 NOTE — ASSESSMENT & PLAN NOTE
· Patient noted with elevated BP on admission with 150-180 SBP  · Likely secondary to recent steroid use, pain, and anxiety  · No need for treatment at this point given lack of previous history and healthy lifestyle, but can assess for PRN if patient continuously high.  If elevated, can treat pain initially
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
## 2017-11-29 NOTE — ASSESSMENT & PLAN NOTE
Non-bloody diarrhea which we is likely related to her colitis rather than C diff due to intermittent formed stools    Recommendations:  -Follow stool studies at this point would not recommend anti-diarrheals
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
## 2017-11-30 ENCOUNTER — TELEPHONE (OUTPATIENT)
Dept: GASTROENTEROLOGY | Facility: CLINIC | Age: 52
End: 2017-11-30

## 2017-11-30 LAB
ALBUMIN SERPL BCP-MCNC: 2.4 G/DL
ALP SERPL-CCNC: 74 U/L
ALT SERPL W/O P-5'-P-CCNC: 6 U/L
ANION GAP SERPL CALC-SCNC: 9 MMOL/L
ANISOCYTOSIS BLD QL SMEAR: SLIGHT
AST SERPL-CCNC: 7 U/L
BASOPHILS # BLD AUTO: ABNORMAL K/UL
BASOPHILS NFR BLD: 0 %
BILIRUB SERPL-MCNC: 0.5 MG/DL
BUN SERPL-MCNC: 14 MG/DL
CALCIUM SERPL-MCNC: 8.6 MG/DL
CHLORIDE SERPL-SCNC: 100 MMOL/L
CO2 SERPL-SCNC: 28 MMOL/L
CREAT SERPL-MCNC: 0.9 MG/DL
DIFFERENTIAL METHOD: ABNORMAL
EOSINOPHIL # BLD AUTO: ABNORMAL K/UL
EOSINOPHIL NFR BLD: 1 %
ERYTHROCYTE [DISTWIDTH] IN BLOOD BY AUTOMATED COUNT: 12.4 %
EST. GFR  (AFRICAN AMERICAN): >60 ML/MIN/1.73 M^2
EST. GFR  (NON AFRICAN AMERICAN): >60 ML/MIN/1.73 M^2
GLUCOSE SERPL-MCNC: 92 MG/DL
HCT VFR BLD AUTO: 39.8 %
HGB BLD-MCNC: 13.5 G/DL
IMM GRANULOCYTES # BLD AUTO: ABNORMAL K/UL
IMM GRANULOCYTES NFR BLD AUTO: ABNORMAL %
LYMPHOCYTES # BLD AUTO: ABNORMAL K/UL
LYMPHOCYTES NFR BLD: 18 %
MCH RBC QN AUTO: 28.8 PG
MCHC RBC AUTO-ENTMCNC: 33.9 G/DL
MCV RBC AUTO: 85 FL
METAMYELOCYTES NFR BLD MANUAL: 3 %
MONOCYTES # BLD AUTO: ABNORMAL K/UL
MONOCYTES NFR BLD: 7 %
MYELOCYTES NFR BLD MANUAL: 1 %
NEUTROPHILS NFR BLD: 65 %
NEUTS BAND NFR BLD MANUAL: 5 %
NRBC BLD-RTO: 0 /100 WBC
O+P STL TRI STN: NORMAL
PLATELET # BLD AUTO: 366 K/UL
PLATELET BLD QL SMEAR: ABNORMAL
PMV BLD AUTO: 9.2 FL
POCT GLUCOSE: 82 MG/DL (ref 70–110)
POTASSIUM SERPL-SCNC: 4.2 MMOL/L
PROT SERPL-MCNC: 6.2 G/DL
RBC # BLD AUTO: 4.69 M/UL
SODIUM SERPL-SCNC: 137 MMOL/L
WBC # BLD AUTO: 7.31 K/UL

## 2017-11-30 PROCEDURE — 99253 IP/OBS CNSLTJ NEW/EST LOW 45: CPT | Mod: ,,, | Performed by: INTERNAL MEDICINE

## 2017-11-30 PROCEDURE — 99232 SBSQ HOSP IP/OBS MODERATE 35: CPT | Mod: ,,, | Performed by: HOSPITALIST

## 2017-11-30 PROCEDURE — 25000003 PHARM REV CODE 250: Performed by: STUDENT IN AN ORGANIZED HEALTH CARE EDUCATION/TRAINING PROGRAM

## 2017-11-30 PROCEDURE — 25000003 PHARM REV CODE 250

## 2017-11-30 PROCEDURE — 36415 COLL VENOUS BLD VENIPUNCTURE: CPT

## 2017-11-30 PROCEDURE — 11000001 HC ACUTE MED/SURG PRIVATE ROOM

## 2017-11-30 PROCEDURE — 80053 COMPREHEN METABOLIC PANEL: CPT

## 2017-11-30 PROCEDURE — 85027 COMPLETE CBC AUTOMATED: CPT

## 2017-11-30 PROCEDURE — 63600175 PHARM REV CODE 636 W HCPCS: Performed by: STUDENT IN AN ORGANIZED HEALTH CARE EDUCATION/TRAINING PROGRAM

## 2017-11-30 PROCEDURE — 25000003 PHARM REV CODE 250: Performed by: HOSPITALIST

## 2017-11-30 PROCEDURE — 85007 BL SMEAR W/DIFF WBC COUNT: CPT

## 2017-11-30 RX ORDER — PREDNISONE 20 MG/1
40 TABLET ORAL DAILY
Status: DISCONTINUED | OUTPATIENT
Start: 2017-11-30 | End: 2017-12-01 | Stop reason: HOSPADM

## 2017-11-30 RX ADMIN — PREDNISONE 40 MG: 20 TABLET ORAL at 09:11

## 2017-11-30 RX ADMIN — AMLODIPINE BESYLATE 5 MG: 5 TABLET ORAL at 09:11

## 2017-11-30 RX ADMIN — Medication 125 MG: at 06:11

## 2017-11-30 RX ADMIN — ACETAMINOPHEN 1000 MG: 500 TABLET ORAL at 11:11

## 2017-11-30 RX ADMIN — Medication 125 MG: at 05:11

## 2017-11-30 RX ADMIN — MESALAMINE 1.5 G: 0.38 CAPSULE, EXTENDED RELEASE ORAL at 09:11

## 2017-11-30 RX ADMIN — Medication 125 MG: at 12:11

## 2017-11-30 RX ADMIN — Medication 125 MG: at 11:11
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
## 2017-11-30 NOTE — TELEPHONE ENCOUNTER
----- Message from Lucía Rucker MA sent at 11/30/2017  2:42 PM CST -----  Regarding: FW: Appt request  I think this is for your Dr Walsh  ----- Message -----  From: Vaughn Dacosta RN  Sent: 11/30/2017   2:26 PM  To: Jillian CAICEDO Staff  Subject: Appt request                                     Pt d/matthew from [facility] today with acute UC flare and C diff. Request follow-up appt in 1-2 wks.    Thanks,  [place]  [phone]

## 2017-11-30 NOTE — PLAN OF CARE
Problem: Fall Risk (Adult)  Goal: Absence of Falls  Patient will demonstrate the desired outcomes by discharge/transition of care.   Outcome: Ongoing (interventions implemented as appropriate)  Absence of falls noted at present. SR up x's2. Contact precautions IP. Pt reminded to wash hands with soap and water after using bathroom and throughout hospital stay (DO NOT USE Hand ) secondary to current contact precautions. Verbalized understanding. Call bell within reach. Will cont to monitor.

## 2017-11-30 NOTE — SUBJECTIVE & OBJECTIVE
Interval History: Continues to have loose nonbloody BMs.      Review of Systems   Constitutional: Negative for chills and fever.   HENT: Negative for sneezing and sore throat.    Eyes: Negative for pain and visual disturbance.   Respiratory: Negative for cough, shortness of breath and wheezing.    Cardiovascular: Negative for chest pain and leg swelling.   Gastrointestinal: Positive for diarrhea. Negative for abdominal pain, constipation and nausea.   Genitourinary: Negative for dysuria, frequency and urgency.   Allergic/Immunologic: Negative for environmental allergies.   Neurological: Negative for dizziness, light-headedness, numbness and headaches.   Psychiatric/Behavioral: Negative for agitation. The patient is not nervous/anxious.      Objective:     Vital Signs (Most Recent):  Temp: 97.4 °F (36.3 °C) (11/30/17 1204)  Pulse: 80 (11/30/17 1204)  Resp: 18 (11/30/17 1204)  BP: 132/82 (11/30/17 1204)  SpO2: (!) 94 % (11/30/17 1204) Vital Signs (24h Range):  Temp:  [97.4 °F (36.3 °C)-97.9 °F (36.6 °C)] 97.4 °F (36.3 °C)  Pulse:  [69-83] 80  Resp:  [16-18] 18  SpO2:  [94 %-97 %] 94 %  BP: (125-144)/(78-95) 132/82     Weight: 49 kg (108 lb)  Body mass index is 19.75 kg/m².    Intake/Output Summary (Last 24 hours) at 11/30/17 1654  Last data filed at 11/30/17 0500   Gross per 24 hour   Intake              600 ml   Output                0 ml   Net              600 ml      Physical Exam   Constitutional: She is oriented to person, place, and time. She appears well-developed and well-nourished. No distress.   HENT:   Head: Normocephalic and atraumatic.   Nose: Nose normal.   Eyes: EOM are normal. No scleral icterus.   Neck: Normal range of motion. No tracheal deviation present.   Cardiovascular: Normal rate and regular rhythm.  Exam reveals no gallop and no friction rub.    No murmur heard.  Pulmonary/Chest: Effort normal. No respiratory distress. She has no wheezes. She has no rales.   Abdominal: Soft. Bowel sounds are  normal.   Mild ttp   Musculoskeletal: She exhibits no edema.   Neurological: She is alert and oriented to person, place, and time.   Skin: Skin is warm and dry.       Significant Labs:   CBC:   Recent Labs  Lab 11/29/17 0421 11/30/17 0411   WBC 9.38 7.31   HGB 13.5 13.5   HCT 41.3 39.8   * 366*     CMP:   Recent Labs  Lab 11/29/17 0421 11/30/17 0411    137   K 4.0 4.2    100   CO2 28 28   * 92   BUN 15 14   CREATININE 0.7 0.9   CALCIUM 8.6* 8.6*   PROT 6.4 6.2   ALBUMIN 2.4* 2.4*   BILITOT 0.6 0.5   ALKPHOS 77 74   AST 7* 7*   ALT 5* 6*   ANIONGAP 9 9   EGFRNONAA >60.0 >60.0       Significant Imaging: I have reviewed all pertinent imaging results/findings within the past 24 hours.
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
## 2017-11-30 NOTE — TREATMENT PLAN
Update on Treatment Plan  11/29/2017  6:30 PM    In light of patient being positive for C diff would recommend stopping steroid infusion and adding Prednisone PO to start in AM (infusion D/C and po steroids started by GI fellow) and starting Vancomycin 125 mg PO QID (flagyl D/C by GI fellow). Lastly also recommend holding off on CT of the spine, as C diff is likely the cause of extremely related CRP which we didn't feel correlated with extent of disease.    New findings have been discussed with IBD attending. If there are any additional questions please contact LACY Rae M.D.  Gastroenterology Fellow, PGY-IV  Pager: [phone]  [facility]

## 2017-12-01 ENCOUNTER — PATIENT MESSAGE (OUTPATIENT)
Dept: GASTROENTEROLOGY | Facility: CLINIC | Age: 52
End: 2017-12-01

## 2017-12-01 ENCOUNTER — TELEPHONE (OUTPATIENT)
Dept: GASTROENTEROLOGY | Facility: CLINIC | Age: 52
End: 2017-12-01

## 2017-12-01 VITALS
HEART RATE: 65 BPM | DIASTOLIC BLOOD PRESSURE: 84 MMHG | BODY MASS INDEX: 19.88 KG/M2 | RESPIRATION RATE: 17 BRPM | WEIGHT: 108 LBS | OXYGEN SATURATION: 98 % | SYSTOLIC BLOOD PRESSURE: 136 MMHG | HEIGHT: 62 IN | TEMPERATURE: 98 F

## 2017-12-01 LAB
ALBUMIN SERPL BCP-MCNC: 2.5 G/DL
ALP SERPL-CCNC: 73 U/L
ALT SERPL W/O P-5'-P-CCNC: 6 U/L
ANION GAP SERPL CALC-SCNC: 6 MMOL/L
AST SERPL-CCNC: 8 U/L
BASOPHILS # BLD AUTO: 0.09 K/UL
BASOPHILS NFR BLD: 0.9 %
BILIRUB SERPL-MCNC: 0.4 MG/DL
BUN SERPL-MCNC: 17 MG/DL
CALCIUM SERPL-MCNC: 8.4 MG/DL
CHLORIDE SERPL-SCNC: 103 MMOL/L
CO2 SERPL-SCNC: 32 MMOL/L
CREAT SERPL-MCNC: 0.8 MG/DL
CRP SERPL-MCNC: 20.5 MG/L
DIFFERENTIAL METHOD: ABNORMAL
EOSINOPHIL # BLD AUTO: 0.1 K/UL
EOSINOPHIL NFR BLD: 1.1 %
ERYTHROCYTE [DISTWIDTH] IN BLOOD BY AUTOMATED COUNT: 12.2 %
EST. GFR  (AFRICAN AMERICAN): >60 ML/MIN/1.73 M^2
EST. GFR  (NON AFRICAN AMERICAN): >60 ML/MIN/1.73 M^2
GLUCOSE SERPL-MCNC: 76 MG/DL
HCT VFR BLD AUTO: 42.1 %
HGB BLD-MCNC: 13.7 G/DL
IMM GRANULOCYTES # BLD AUTO: 0.41 K/UL
IMM GRANULOCYTES NFR BLD AUTO: 4 %
LYMPHOCYTES # BLD AUTO: 2.3 K/UL
LYMPHOCYTES NFR BLD: 22.3 %
MCH RBC QN AUTO: 28 PG
MCHC RBC AUTO-ENTMCNC: 32.5 G/DL
MCV RBC AUTO: 86 FL
MONOCYTES # BLD AUTO: 0.7 K/UL
MONOCYTES NFR BLD: 6.9 %
NEUTROPHILS # BLD AUTO: 6.6 K/UL
NEUTROPHILS NFR BLD: 64.8 %
NRBC BLD-RTO: 0 /100 WBC
NUDT15 GENOTYPE: NORMAL
NUDT15 PHENOTYPE: NORMAL
PLATELET # BLD AUTO: 326 K/UL
PMV BLD AUTO: 9.5 FL
POTASSIUM SERPL-SCNC: 3.3 MMOL/L
PREALB SERPL-MCNC: 15 MG/DL
PROT SERPL-MCNC: 6.2 G/DL
RBC # BLD AUTO: 4.89 M/UL
SODIUM SERPL-SCNC: 141 MMOL/L
TPMT ADDITIONAL INFORMATION: NORMAL
TPMT DISCLAIMER: NORMAL
TPMT GENOTYPE RESULT: NORMAL
TPMT INTERPRETATION: NORMAL
TPMT METHOD: NORMAL
TPMT PHENOTYPE: NORMAL
TPMT REVIEWED BY: NORMAL
WBC # BLD AUTO: 10.13 K/UL

## 2017-12-01 PROCEDURE — 80053 COMPREHEN METABOLIC PANEL: CPT

## 2017-12-01 PROCEDURE — 86481 TB AG RESPONSE T-CELL SUSP: CPT

## 2017-12-01 PROCEDURE — 25000003 PHARM REV CODE 250: Performed by: STUDENT IN AN ORGANIZED HEALTH CARE EDUCATION/TRAINING PROGRAM

## 2017-12-01 PROCEDURE — 99238 HOSP IP/OBS DSCHRG MGMT 30/<: CPT | Mod: ,,, | Performed by: HOSPITALIST

## 2017-12-01 PROCEDURE — 99232 SBSQ HOSP IP/OBS MODERATE 35: CPT | Mod: ,,, | Performed by: INTERNAL MEDICINE

## 2017-12-01 PROCEDURE — 85025 COMPLETE CBC W/AUTO DIFF WBC: CPT

## 2017-12-01 PROCEDURE — 25000003 PHARM REV CODE 250: Performed by: HOSPITALIST

## 2017-12-01 PROCEDURE — 36415 COLL VENOUS BLD VENIPUNCTURE: CPT

## 2017-12-01 PROCEDURE — 63600175 PHARM REV CODE 636 W HCPCS: Performed by: STUDENT IN AN ORGANIZED HEALTH CARE EDUCATION/TRAINING PROGRAM

## 2017-12-01 PROCEDURE — 84134 ASSAY OF PREALBUMIN: CPT

## 2017-12-01 PROCEDURE — 86140 C-REACTIVE PROTEIN: CPT

## 2017-12-01 RX ORDER — AMLODIPINE BESYLATE 5 MG/1
5 TABLET ORAL DAILY
Qty: 30 TABLET | Refills: 3 | Status: SHIPPED | OUTPATIENT
Start: 2017-12-01 | End: 2018-03-20

## 2017-12-01 RX ORDER — PREDNISONE 10 MG/1
TABLET ORAL
Qty: 90 TABLET | Refills: 0 | Status: SHIPPED | OUTPATIENT
Start: 2017-12-01 | End: 2017-12-01

## 2017-12-01 RX ORDER — PREDNISONE 10 MG/1
TABLET ORAL
Qty: 90 TABLET | Refills: 0 | Status: SHIPPED | OUTPATIENT
Start: 2017-12-01 | End: 2018-02-12

## 2017-12-01 RX ORDER — PREDNISONE 10 MG/1
TABLET ORAL
Qty: 25 TABLET | Refills: 0 | Status: SHIPPED | OUTPATIENT
Start: 2017-12-01 | End: 2017-12-11

## 2017-12-01 RX ORDER — POTASSIUM CHLORIDE 20 MEQ/1
40 TABLET, EXTENDED RELEASE ORAL ONCE
Status: COMPLETED | OUTPATIENT
Start: 2017-12-01 | End: 2017-12-01

## 2017-12-01 RX ORDER — VANCOMYCIN HYDROCHLORIDE 125 MG/1
125 CAPSULE ORAL EVERY 6 HOURS
Qty: 52 CAPSULE | Refills: 0 | Status: SHIPPED | OUTPATIENT
Start: 2017-12-01 | End: 2017-12-14

## 2017-12-01 RX ADMIN — Medication 125 MG: at 12:12

## 2017-12-01 RX ADMIN — PREDNISONE 40 MG: 20 TABLET ORAL at 09:12

## 2017-12-01 RX ADMIN — TRAMADOL HYDROCHLORIDE 50 MG: 50 TABLET, FILM COATED ORAL at 04:12

## 2017-12-01 RX ADMIN — Medication 125 MG: at 04:12

## 2017-12-01 RX ADMIN — POTASSIUM CHLORIDE 40 MEQ: 1500 TABLET, EXTENDED RELEASE ORAL at 09:12

## 2017-12-01 RX ADMIN — AMLODIPINE BESYLATE 5 MG: 5 TABLET ORAL at 09:12

## 2017-12-01 RX ADMIN — MESALAMINE 1.5 G: 0.38 CAPSULE, EXTENDED RELEASE ORAL at 09:12

## 2017-12-01 NOTE — SUBJECTIVE & OBJECTIVE
"  Subjective:     Interval History:   Patient positive for C diff therefore yesterday IV steroid infusion was stopped, PO prednisone was added to start today, and Vancomycin PO was started (1 dose given last night).    In the last 24 hours from 8 am yesterday to 8 am today she went to the bathroom 6 times. During 3 of those she only had "gas pains" with no BM and then during the 3 latter ones she has small amounts of loose stools. This morning she is still complaining of significant abdominal pressure and gas.    Review of Systems   Constitutional: Positive for activity change, appetite change and fatigue. Negative for chills and fever.   HENT: Negative for trouble swallowing.    Eyes: Negative.    Respiratory: Negative.    Cardiovascular: Negative.    Gastrointestinal: Positive for abdominal pain, diarrhea and nausea. Negative for abdominal distention, anal bleeding, blood in stool, constipation, rectal pain and vomiting.   Endocrine: Negative.    Genitourinary: Negative.    Neurological: Negative.    Hematological: Negative.      Objective:     Vital Signs (Most Recent):  Temp: 97.8 °F (36.6 °C) (11/30/17 1655)  Pulse: 78 (11/30/17 1655)  Resp: 17 (11/30/17 1655)  BP: 135/88 (11/30/17 1655)  SpO2: 96 % (11/30/17 1655) Vital Signs (24h Range):  Temp:  [97.4 °F (36.3 °C)-97.9 °F (36.6 °C)] 97.8 °F (36.6 °C)  Pulse:  [69-83] 78  Resp:  [16-18] 17  SpO2:  [94 %-97 %] 96 %  BP: (125-144)/(78-95) 135/88     Weight: 49 kg (108 lb) (11/28/17 2222)  Body mass index is 19.75 kg/m².      Intake/Output Summary (Last 24 hours) at 11/30/17 1851  Last data filed at 11/30/17 0500   Gross per 24 hour   Intake              360 ml   Output                0 ml   Net              360 ml       Lines/Drains/Airways     Peripheral Intravenous Line                 Peripheral IV - Single Lumen 11/28/17 1753 Left Antecubital 2 days         Peripheral IV - Single Lumen 11/29/17 0931 Right Forearm 1 day                Physical Exam "   Constitutional: She is oriented to person, place, and time. No distress.   HENT:   Head: Normocephalic and atraumatic.   Eyes: No scleral icterus.   Neck: Normal range of motion.   Cardiovascular: Normal rate and regular rhythm.    Pulmonary/Chest: Effort normal and breath sounds normal.   Abdominal: Soft. Bowel sounds are normal. She exhibits no distension and no mass. There is tenderness. There is no rebound and no guarding. No hernia.   Musculoskeletal: Normal range of motion. She exhibits no edema.   Neurological: She is alert and oriented to person, place, and time.   Skin: She is not diaphoretic.       Significant Labs:  CBC:   Recent Labs  Lab 11/29/17  0421 11/30/17 0411   WBC 9.38 7.31   HGB 13.5 13.5   HCT 41.3 39.8   * 366*     CMP:   Recent Labs  Lab 11/30/17 0411   GLU 92   CALCIUM 8.6*   ALBUMIN 2.4*   PROT 6.2      K 4.2   CO2 28      BUN 14   CREATININE 0.9   ALKPHOS 74   ALT 6*   AST 7*   BILITOT 0.5     Significant Imaging:  Imaging results within the past 24 hours have been reviewed.

## 2017-12-01 NOTE — ASSESSMENT & PLAN NOTE
Nutrition Problem  Inadequate oral intake    Related to (etiology):   Chronic Diarreha, c. Diff, abdominal pain    Signs and Symptoms (as evidenced by):   PO intake 0-10%    Interventions/Recommendations (treatment strategy):  See recs above    Nutrition Diagnosis Status:   New
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
## 2017-12-01 NOTE — PLAN OF CARE
Problem: Nutrition, Imbalanced: Inadequate Oral Intake (Adult)  Intervention: Promote/Optimize Nutrition  Recommendations     Recommendation/Intervention: cont. w/ low fiber diet with the addition of Beneprotein (2 pks per meal) RN to mix and administer at bed side. If po intake does not improve by day 10 begin parental nutrition. Recommend 4.25% AA/10% DEX@ 55ml/hr +Plus 250ml/day 20% lipids. lytes per MD, w/ MVI and trace elements. provides 100% ROBINSON and 97%EEN. RD to follow  Goals: PO intake > 50% EEN/EPN  Nutrition Goal Status: new  Communication of RD Recs: reviewed with physician (sticky note)  Assessment and Plan         Mild ulcerative colitis     Nutrition Problem  Inadequate oral intake     Related to (etiology):   Chronic Diarreha, c. Diff, abdominal pain     Signs and Symptoms (as evidenced by):   PO intake 0-10%     Interventions/Recommendations (treatment strategy):  See recs above     Nutrition Diagnosis Status:   New

## 2017-12-01 NOTE — TELEPHONE ENCOUNTER
----- Message from Mike Walsh MD sent at 12/1/2017  9:21 AM CST -----  Please schedule patient for visit with Magdalena the week of 12/11 unless I have availability. Preferable when I am in clinic have her see Magdalena but if not then on a Wed or Friday morning please.     SS

## 2017-12-01 NOTE — PLAN OF CARE
Problem: Fall Risk (Adult)  Goal: Absence of Falls  Patient will demonstrate the desired outcomes by discharge/transition of care.   Outcome: Ongoing (interventions implemented as appropriate)  Absence of falls noted at present. SR up x's2. Pt enc to call for assistance as needed. Verbalized understanding. Special contact precautions IP and pt reminded to wash hands with soap and water ONLY. Verbalized understanding. Call bell within reach .Will cont to monitor.
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
## 2017-12-01 NOTE — SUBJECTIVE & OBJECTIVE
Interval History: Continues to have loose nonbloody BMs.      Review of Systems   Constitutional: Negative for chills and fever.   HENT: Negative for sneezing and sore throat.    Eyes: Negative for pain and visual disturbance.   Respiratory: Negative for cough, shortness of breath and wheezing.    Cardiovascular: Negative for chest pain and leg swelling.   Gastrointestinal: Positive for diarrhea. Negative for abdominal pain, constipation and nausea.   Genitourinary: Negative for dysuria, frequency and urgency.   Allergic/Immunologic: Negative for environmental allergies.   Neurological: Negative for dizziness, light-headedness, numbness and headaches.   Psychiatric/Behavioral: Negative for agitation. The patient is not nervous/anxious.      Objective:     Vital Signs (Most Recent):  Temp: 97.9 °F (36.6 °C) (12/01/17 0804)  Pulse: 65 (12/01/17 0804)  Resp: 17 (12/01/17 0804)  BP: 136/84 (12/01/17 0804)  SpO2: 98 % (12/01/17 0804) Vital Signs (24h Range):  Temp:  [97.4 °F (36.3 °C)-97.9 °F (36.6 °C)] 97.9 °F (36.6 °C)  Pulse:  [65-80] 65  Resp:  [17-19] 17  SpO2:  [94 %-98 %] 98 %  BP: (128-145)/(75-91) 136/84     Weight: 49 kg (108 lb)  Body mass index is 19.75 kg/m².    Intake/Output Summary (Last 24 hours) at 12/01/17 1105  Last data filed at 12/01/17 0415   Gross per 24 hour   Intake              480 ml   Output                0 ml   Net              480 ml      Physical Exam   Constitutional: She is oriented to person, place, and time. She appears well-developed and well-nourished. No distress.   HENT:   Head: Normocephalic and atraumatic.   Nose: Nose normal.   Eyes: EOM are normal. No scleral icterus.   Neck: Normal range of motion. No tracheal deviation present.   Cardiovascular: Normal rate and regular rhythm.  Exam reveals no gallop and no friction rub.    No murmur heard.  Pulmonary/Chest: Effort normal. No respiratory distress. She has no wheezes. She has no rales.   Abdominal: Soft. Bowel sounds are  normal.   Mild ttp   Musculoskeletal: She exhibits no edema.   Neurological: She is alert and oriented to person, place, and time.   Skin: Skin is warm and dry.       Significant Labs:   CBC:     Recent Labs  Lab 11/30/17 0411 12/01/17 0617   WBC 7.31 10.13   HGB 13.5 13.7   HCT 39.8 42.1   * 326     CMP:     Recent Labs  Lab 11/30/17 0411 12/01/17 0617    141   K 4.2 3.3*    103   CO2 28 32*   GLU 92 76   BUN 14 17   CREATININE 0.9 0.8   CALCIUM 8.6* 8.4*   PROT 6.2 6.2   ALBUMIN 2.4* 2.5*   BILITOT 0.5 0.4   ALKPHOS 74 73   AST 7* 8*   ALT 6* 6*   ANIONGAP 9 6*   EGFRNONAA >60.0 >60.0       Significant Imaging: I have reviewed all pertinent imaging results/findings within the past 24 hours.

## 2017-12-01 NOTE — ASSESSMENT & PLAN NOTE
New/acute C diff infection which was not present in early November. When patient initially presented in early November with worsening symptoms a C diff was collected and negative. At that point she was started on steroids and initially improved until about last week. We therefore hypothesize that patient likely contracted C diff between last week and early this week. The current gas and pressure is likely secondary to C diff and we expect symptoms to improve within ~ 5 days. In this patient we recommend Vancomycin due to her immunocompromised state.    Recommendations:  -Continue Vancomycin 125 mg PO Q6HRS  -CRP in AM (order placed)  -Please avoid antispasmodics, anti-diarrheals, and narcotics as we want to avoid any agents which could decrease colonic motility

## 2017-12-01 NOTE — ASSESSMENT & PLAN NOTE
Mild malnutrition in the setting of recent flare and current C diff infection. Since we now have confirmation that patient has C diff we feel comfortable advancing her diet and not starting any parenteral nutrition as her colitis is not as severe as we initially thought on admission.    Recommendations:  -Advance to a low residue diet  -Albumin and prealbumin in AM (order placed)  -Calorie count as well as nutrition consul to optimize nutritional status

## 2017-12-01 NOTE — PLAN OF CARE
Problem: Fall Risk (Adult)  Goal: Absence of Falls  Patient will demonstrate the desired outcomes by discharge/transition of care.   Outcome: Ongoing (interventions implemented as appropriate)   11/30/17 2781   Fall Risk (Adult)   Absence of Falls making progress toward outcome

## 2017-12-01 NOTE — CONSULTS
[facility]  Adult Nutrition  Consult Note    SUMMARY     Recommendations    Recommendation/Intervention: cont. w/ low fiber diet with the addition of Beneprotein (2 pks per meal) RN to mix and administer at bed side. If po intake does not improve by day 10 begin parental nutrition. Recommend 4.25% AA/10% DEX@ 55ml/hr +Plus 250ml/day 20% lipids. lytes per MD, w/ MVI and trace elements. provides 100% ROBINSON and 97%EEN. RD to follow  Goals: PO intake > 50% EEN/EPN  Nutrition Goal Status: new  Communication of RD Recs: reviewed with physician (sticky note)    Reason for Assessment    Reason for Assessment: physician consult  Diagnosis:  (Clostridium difficile infection)  Relevant Medical History: colon polyp, HTN, IBD. c.diff   Interdisciplinary Rounds: did not attend  General Information Comments: pt states very poor  PO intake 2/2 to abdominal pain and diarrhea. she is eating only eggs, refuses boost as it causes loose bowels in the past.     Nutrition Discharge Planning: tolerate PO feeds    Nutrition Prescription Ordered    Current Diet Order: low fiber     Evaluation of Received Nutrients/Fluid Intake        Energy Calories Required: not meeting needs   Protein Required: not meeting needs   I/O: +2.2L since admit    Fluid Required: not meeting needs  Comments: LBM:11/29     % Intake of Estimated Energy Needs: 0 - 25 %  % Meal Intake: 10%     Nutrition Risk Screen     Nutrition Risk Screen: no indicators present    Nutrition/Diet History    Patient Reported Diet/Restrictions/Preferences: general  Typical Food/Fluid Intake: adequate PTA  Food Preferences: no cultural/ Church preferences   Factors Affecting Nutritional Intake: abdominal pain, altered gastrointestinal function, decreased appetite, diarrhea     Labs/Tests/Procedures/Meds       Pertinent Labs Reviewed: reviewed  Pertinent Labs Comments: stable  Pertinent Medications Reviewed: reviewed  Pertinent Medications Comments: Insulin,  "Mesalamine, Prednisone, Vanc    Physical Findings    Overall Physical Appearance: underweight, weak     Oral/Mouth Cavity: all teeth present (age appropriate)  Skin: intact    Anthropometrics    Temp: 97.6 °F (36.4 °C)     Height: 5' 2" (157.5 cm)  Weight Method: Bed Scale  Weight: 49 kg (108 lb)     Ideal Body Weight (IBW), Female: 110 lb     % Ideal Body Weight, Female (lb): 98.18 lb  BMI (Calculated): 19.8  BMI Grade: 18.5-24.9 - normal        Estimated/Assessed Needs    Weight Used For Calorie Calculations: 49 kg (108 lb 0.4 oz)   Height (cm): 157.5 cm  Energy Calorie Requirements (kcal): 1323Kcal  Energy Need Method: [place]-St Jeor (x 1.25)   RMR ([place]-St. Jeor Equation): 1058.25   Weight Used For Protein Calculations: 49 kg (108 lb 0.4 oz)  Protein Requirements: 49-59g/d  Fluid Requirements (mL): per MD  Fluid Need Method: RDA Method   RDA Method (mL): 1323   CHO Requirement: 45-50% total intake     Assessment and Plan    Mild ulcerative colitis    Nutrition Problem  Inadequate oral intake    Related to (etiology):   Chronic Diarreha, c. Diff, abdominal pain    Signs and Symptoms (as evidenced by):   PO intake 0-10%    Interventions/Recommendations (treatment strategy):  See recs above    Nutrition Diagnosis Status:   New                  Monitor and Evaluation    Food and Nutrient Intake: energy intake, food and beverage intake, parenteral nutrition intake  Food and Nutrient Adminstration: diet order, enteral and parenteral nutrition administration     Physical Activity and Function: nutrition-related ADLs and IADLs  Anthropometric Measurements: weight, weight change  Biochemical Data, Medical Tests and Procedures:  (ALl labs)  Nutrition-Focused Physical Findings: overall appearance    Nutrition Risk    Level of Risk:  (f/u 2x/wk)    Nutrition Follow-Up    RD Follow-up?: Yes    "

## 2017-12-01 NOTE — PROGRESS NOTES
AVS d/c instructions given to patient, voices understanding. PIV d/matthew per Nay RN. Patient and  amb off unit.

## 2017-12-02 NOTE — ASSESSMENT & PLAN NOTE
New/acute C diff infection which was not present in early November. When patient initially presented in early November with worsening symptoms a C diff was collected and negative. At that point she was started on steroids and initially improved until about last week. We therefore hypothesize that patient likely contracted C diff between last week and early this week. The current gas and pressure is likely secondary to C diff and we expect symptoms to improve within ~ 5 days.     Labs this morning showed a significant decrease in CRP and although patient continues to report abdominal gas we feel that she can continue treatment at home. We expect continued improved at home. 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

## 2017-12-02 NOTE — SUBJECTIVE & OBJECTIVE
Subjective:     Interval History:   Patient feels much better this morning.  She was able to tolerate a diet all day yesterday with minimal abdominal discomfort until later in the evening when she once again experience abdominal gas and pressure.  She has had 6 BM in the last 24 hours with 3 being formed and 3 being loose (all without blood).    Review of Systems   Constitutional: Negative for activity change, appetite change, chills, fatigue and fever.   HENT: Negative for trouble swallowing.    Eyes: Negative.    Respiratory: Negative.    Cardiovascular: Negative.    Gastrointestinal: Positive for abdominal pain, diarrhea and nausea. Negative for abdominal distention, anal bleeding, blood in stool, constipation, rectal pain and vomiting.   Endocrine: Negative.    Genitourinary: Negative.    Musculoskeletal: Negative.    Neurological: Negative.    Hematological: Negative.      Objective:     Vital Signs (Most Recent):  Temp: 97.9 °F (36.6 °C) (12/01/17 0804)  Pulse: 65 (12/01/17 0804)  Resp: 17 (12/01/17 0804)  BP: 136/84 (12/01/17 0804)  SpO2: 98 % (12/01/17 0804) Vital Signs (24h Range):  Temp:  [97.6 °F (36.4 °C)-97.9 °F (36.6 °C)] 97.9 °F (36.6 °C)  Pulse:  [65-77] 65  Resp:  [17-19] 17  SpO2:  [95 %-98 %] 98 %  BP: (128-145)/(75-91) 136/84     Weight: 49 kg (108 lb) (11/28/17 2222)  Body mass index is 19.75 kg/m².      Intake/Output Summary (Last 24 hours) at 12/01/17 7174  Last data filed at 12/01/17 0415   Gross per 24 hour   Intake              480 ml   Output                0 ml   Net              480 ml       Lines/Drains/Airways          No matching active lines, drains, or airways          Physical Exam   Constitutional: She is oriented to person, place, and time. No distress.   HENT:   Head: Normocephalic and atraumatic.   Eyes: No scleral icterus.   Neck: Normal range of motion.   Cardiovascular: Normal rate and regular rhythm.    Pulmonary/Chest: Effort normal and breath sounds normal.   Abdominal:  Soft. Bowel sounds are normal. She exhibits no distension and no mass. There is no tenderness. There is no rebound and no guarding. No hernia.   Musculoskeletal: Normal range of motion. She exhibits no edema.   Neurological: She is alert and oriented to person, place, and time.   Skin: She is not diaphoretic.       Significant Labs:  CBC:   Recent Labs  Lab 11/30/17  0411 12/01/17  0617   WBC 7.31 10.13   HGB 13.5 13.7   HCT 39.8 42.1   * 326     CMP:   Recent Labs  Lab 12/01/17  0617   GLU 76   CALCIUM 8.4*   ALBUMIN 2.5*   PROT 6.2      K 3.3*   CO2 32*      BUN 17   CREATININE 0.8   ALKPHOS 73   ALT 6*   AST 8*   BILITOT 0.4     Coagulation: No results for input(s): INR, APTT in the last 48 hours.    Invalid input(s): PT      Significant Imaging:  Imaging results within the past 24 hours have been reviewed.
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
## 2017-12-02 NOTE — ASSESSMENT & PLAN NOTE
Mild malnutrition in the setting of recent flare and current C diff infection. Patient was able to tolerate her diet yesterday and feels that this gave her added energy to continue to recover.     Recommendations:  -Continue current diet upon discharge patient should continue to follow a low residue diet and avoid raw fruits and veggies
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
## 2017-12-04 LAB — T-SPOT TB SCREENING TEST: NORMAL

## 2017-12-05 ENCOUNTER — TELEPHONE (OUTPATIENT)
Dept: GASTROENTEROLOGY | Facility: CLINIC | Age: 52
End: 2017-12-05

## 2017-12-05 DIAGNOSIS — E87.6 HYPOKALEMIA: Primary | ICD-10-CM

## 2017-12-05 NOTE — TELEPHONE ENCOUNTER
----- Message from Mike Walsh MD sent at 12/4/2017  7:56 AM CST -----  Be sure to schedule her for f/u appt please   Contact precautions for C diff    SS  ----- Message -----  From: Alhaji Rae MD  Sent: 12/1/2017   6:23 PM  To: Mike Walsh MD    Hi Dr. Walsh    Just a reminder that Mrs. Bautista needs a follow up appointment the week of December 11th.    Alhaji Rae M.D.  Gastroenterology Fellow, PGY-IV  Pager: [phone]  [facility]

## 2017-12-05 NOTE — TELEPHONE ENCOUNTER
Called and spoke with pt  I got her follow up scheduled for 12/11 @ 3:00  Appoint reminder mailed per pt    While I was on the phone with her she stated she still has no energy and is still loosing weight  She wanted to know the results of her last set of labs.  She thinks she needs to be taking potassium   I told her I would send message over and I will be back in touch once I get a response

## 2017-12-06 RX ORDER — POTASSIUM CHLORIDE 20 MEQ/1
20 TABLET, EXTENDED RELEASE ORAL DAILY
Qty: 3 TABLET | Refills: 0 | Status: ON HOLD | OUTPATIENT
Start: 2017-12-06 | End: 2018-03-14

## 2017-12-06 NOTE — TELEPHONE ENCOUNTER
Spoke to patient re symptoms.  States she is feeling a bit better but is still fatigued.  Having 3 soft-formed BMS/day with no blood.  No longer having watery stools.  Taking oral vancomycin 125 mg 4 times/day and is on prednisone 35 mg PO daily.  Will be decreasing to prednisone 30 mg PO daily on 12/9/2017.  Last potassium 3.3 on 12/1/2017 so will supplement with KCL 20 mEq PO daily for 3 days and will repeat labs on 12/11/2017 after her clinic visit.  She is eating well but instructed to add boost/ensure and do not replace meals.  We will be seeing her on 12/11/2017 for a clinic follow up and will also send her to the Dietician for evaluation.  Patient verbalizes understanding and agrees with the treatment plan.  Questions answered.    KG

## 2017-12-10 ENCOUNTER — PATIENT MESSAGE (OUTPATIENT)
Dept: GASTROENTEROLOGY | Facility: CLINIC | Age: 52
End: 2017-12-10

## 2017-12-10 DIAGNOSIS — R19.7 DIARRHEA, UNSPECIFIED TYPE: ICD-10-CM

## 2017-12-10 DIAGNOSIS — R53.83 FATIGUE, UNSPECIFIED TYPE: ICD-10-CM

## 2017-12-10 DIAGNOSIS — K51.00 ULCERATIVE PANCOLITIS: Primary | ICD-10-CM

## 2017-12-11 ENCOUNTER — HOSPITAL ENCOUNTER (OUTPATIENT)
Dept: RADIOLOGY | Facility: HOSPITAL | Age: 52
Discharge: HOME | End: 2017-12-11
Attending: NURSE PRACTITIONER
Payer: COMMERCIAL

## 2017-12-11 ENCOUNTER — TELEPHONE (OUTPATIENT)
Dept: GASTROENTEROLOGY | Facility: CLINIC | Age: 52
End: 2017-12-11

## 2017-12-11 ENCOUNTER — OFFICE VISIT (OUTPATIENT)
Dept: GASTROENTEROLOGY | Facility: CLINIC | Age: 52
End: 2017-12-11
Payer: COMMERCIAL

## 2017-12-11 VITALS
TEMPERATURE: 97 F | HEIGHT: 62 IN | RESPIRATION RATE: 16 BRPM | BODY MASS INDEX: 20.16 KG/M2 | WEIGHT: 109.56 LBS | SYSTOLIC BLOOD PRESSURE: 125 MMHG | HEART RATE: 114 BPM | DIASTOLIC BLOOD PRESSURE: 83 MMHG

## 2017-12-11 DIAGNOSIS — Z79.52 ON PREDNISONE THERAPY: ICD-10-CM

## 2017-12-11 DIAGNOSIS — F41.9 ANXIETY: ICD-10-CM

## 2017-12-11 DIAGNOSIS — K51.00 ULCERATIVE PANCOLITIS: Primary | ICD-10-CM

## 2017-12-11 DIAGNOSIS — I10 ESSENTIAL HYPERTENSION: ICD-10-CM

## 2017-12-11 DIAGNOSIS — Z91.89 HIGH RISK FOR COLON CANCER: ICD-10-CM

## 2017-12-11 DIAGNOSIS — M79.622 PAIN OF LEFT UPPER ARM: ICD-10-CM

## 2017-12-11 DIAGNOSIS — E44.1 MILD PROTEIN-CALORIE MALNUTRITION: ICD-10-CM

## 2017-12-11 PROBLEM — R19.7 DIARRHEA: Status: RESOLVED | Noted: 2017-11-16 | Resolved: 2017-12-11

## 2017-12-11 PROBLEM — A49.8 CLOSTRIDIUM DIFFICILE INFECTION: Status: RESOLVED | Noted: 2017-11-29 | Resolved: 2017-12-11

## 2017-12-11 PROCEDURE — 99215 OFFICE O/P EST HI 40 MIN: CPT | Mod: S$GLB,,, | Performed by: NURSE PRACTITIONER

## 2017-12-11 PROCEDURE — 99999 PR PBB SHADOW E&M-EST. PATIENT-LVL V: CPT | Mod: PBBFAC,,, | Performed by: NURSE PRACTITIONER

## 2017-12-11 PROCEDURE — 93971 EXTREMITY STUDY: CPT | Mod: 26,,, | Performed by: RADIOLOGY

## 2017-12-11 PROCEDURE — 93971 EXTREMITY STUDY: CPT | Mod: TC

## 2017-12-11 NOTE — PATIENT INSTRUCTIONS
Instructions:  - continue Apriso 1.5 gm/day  - taper prednisone to 20 mg on 12/16/2017 then by 5 mg every 7 days until you off  - US LUE to be scheduled  - Calcium 1200 mg daily and vitamin D3 1000 IU daily  - schedule appointment with Nutritionist ASAP  - Avoid all NSAIDs (Advil, Ibuprofen, Motrin, Aspirin, Naprosyn, Aleve)  - Pap Smears recommended per age  - Yearly Eye exam  - Yearly Skin exam--wear sun block and hats  - Use antibiotics with caution  - Vaccines recommended:  Flu shot yearly  Tetanus every 10 years  - Follow up with MARK Nichols in 2 months with Dr. Walsh in clinic
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
## 2017-12-11 NOTE — TELEPHONE ENCOUNTER
Called and spoke with Lien in the lab  She stated they will add the TTG Igg to pts labs and will call my EXT if there is a problem

## 2017-12-12 ENCOUNTER — PATIENT MESSAGE (OUTPATIENT)
Dept: GASTROENTEROLOGY | Facility: CLINIC | Age: 52
End: 2017-12-12

## 2017-12-12 ENCOUNTER — TELEPHONE (OUTPATIENT)
Dept: HEMATOLOGY/ONCOLOGY | Facility: CLINIC | Age: 52
End: 2017-12-12

## 2017-12-12 DIAGNOSIS — I82.612: Primary | ICD-10-CM

## 2017-12-12 NOTE — TELEPHONE ENCOUNTER
Spoke to patient re US results.  She has a there is a thrombosed left basilic vein (superficial) with no DVT.  We will place a referral to Hematology for further evaluation.  Patient verbalizes understanding and agrees with the treatment plan.  Questions answered.    KG

## 2017-12-14 ENCOUNTER — TELEPHONE (OUTPATIENT)
Dept: GASTROENTEROLOGY | Facility: CLINIC | Age: 52
End: 2017-12-14

## 2017-12-14 NOTE — TELEPHONE ENCOUNTER
----- Message from Susan Nieto sent at 12/14/2017  9:16 AM CST -----  Contact: self - [phone]  rena - is asking to reschedule her dietitian appt - epic would not allow me to reschedule this - please call patient at

## 2017-12-14 NOTE — TELEPHONE ENCOUNTER
Called pt, no answer, LM explaining I was calling to get her appoint rescheduled and left my direct line for her to call back

## 2017-12-15 ENCOUNTER — INITIAL CONSULT (OUTPATIENT)
Dept: HEMATOLOGY/ONCOLOGY | Facility: CLINIC | Age: 52
End: 2017-12-15
Payer: COMMERCIAL

## 2017-12-15 ENCOUNTER — PATIENT MESSAGE (OUTPATIENT)
Dept: GASTROENTEROLOGY | Facility: CLINIC | Age: 52
End: 2017-12-15

## 2017-12-15 VITALS — DIASTOLIC BLOOD PRESSURE: 71 MMHG | SYSTOLIC BLOOD PRESSURE: 115 MMHG

## 2017-12-15 DIAGNOSIS — K51.00 ULCERATIVE PANCOLITIS: ICD-10-CM

## 2017-12-15 DIAGNOSIS — I82.619 BASILIC VEIN THROMBOSIS: Primary | ICD-10-CM

## 2017-12-15 DIAGNOSIS — I10 ESSENTIAL HYPERTENSION: ICD-10-CM

## 2017-12-15 PROCEDURE — 99244 OFF/OP CNSLTJ NEW/EST MOD 40: CPT | Mod: S$GLB,,, | Performed by: INTERNAL MEDICINE

## 2017-12-15 PROCEDURE — 99999 PR PBB SHADOW E&M-EST. PATIENT-LVL II: CPT | Mod: PBBFAC,,, | Performed by: INTERNAL MEDICINE

## 2017-12-15 NOTE — ASSESSMENT & PLAN NOTE
Ms. Bautista was recently diagnosed with a left basilic vein thrombus that is likely related to her IV insertion while hospitalized. We reviewed that upper extremity superficial vein thrombosis is usually self-limited and improves with supportive care. I advised continued elevation of the extremity and warm compresses. Often, anti-inflammatory agents such as NSAIDs or aspirin are recommended, but she reports that these agents are contra-indicated due to her UC.     Her thrombus appears to have nearly resolved at this point. Anticoagulation is not indicated as this is not a deep vein thrombosis. She is not at increased risk for DVT/PE based on a catheter-related thrombus alone, though I did advise her that she may have slightly elevated risk of thrombosis in general due to her underlying inflammatory bowel disease.    At this time, no further therapy is required (other than continued supportive care). She may follow-up in hematology clinic as needed.

## 2017-12-15 NOTE — PROGRESS NOTES
HEMATOLOGY CONSULT NOTE    IDENTIFYING STATEMENT   Ansley Beltre) is a 52 y.o. female with a  of 1965 from [place], referred by TANGELA Nunn for evaluation of upper extremity superficial vein thrombosis.     HISTORY OF PRESENT ILLNESS:      Ms. Bautista is 52, has ulcerative colitis, HTN, and was recently admitted to the hospital for flare of her ulcerative colitis. She was found to have a superficial vein thrombus in the left basilic vein at a hospital follow-up appointment with her gastroenterology team and has been referred to hematology for evaluation.     Ms. Bautista is actually feeling improved today with respect to her left upper extremity with respect to her original presentation of the thrombus on . She had been hospitalized and had multiple IVs, including one placed in the vein that was affected. She explains that she had a number of problems related to her IV lines while hospitalized, including difficulty with infusing fluids and possibel extravasation. When she presented in follow-up on , she had swelling and pain in the LUE. She reports that it was very tender to touch. She therefore had ultrasound demonstrated the left basilic vein thrombus.    Since being diagnosed, she has been seeing a chiropractor, and she thinks that this has helped her swelling. She also admits that it may be just getting better with time. Overall things are improving. She remains fatigued related to ulcerative colitis, and she has not resumed her normal exercise routine (she is an ).     Past Medical History:   Diagnosis Date    Colon polyp     Hypertension     Ulcerative colitis        Family History   Problem Relation Age of Onset    Diverticulitis Mother     Hypertension Father     Diabetes Father     Heart disease Father     Hypertension Sister     Hypertension Brother     Celiac disease Neg Hx     Cirrhosis Neg Hx     Colon cancer Neg Hx     Colon polyps Neg Hx      Crohn's disease Neg Hx     Cystic fibrosis Neg Hx     Esophageal cancer Neg Hx     Inflammatory bowel disease Neg Hx     Hemochromatosis Neg Hx     Irritable bowel syndrome Neg Hx     Liver cancer Neg Hx     Liver disease Neg Hx     Rectal cancer Neg Hx     Stomach cancer Neg Hx     Ulcerative colitis Neg Hx     Elpidio's disease Neg Hx        Social History     Social History    Marital status:      Spouse name: N/A    Number of children: N/A    Years of education: N/A     Occupational History    Not on file.     Social History Main Topics    Smoking status: Former Smoker    Smokeless tobacco: Never Used      Comment: quit around ; smoked when drank, not daily    Alcohol use 0.6 - 1.2 oz/week     1 - 2 Glasses of wine per week      Comment: 1-2 in a month    Drug use: No    Sexual activity: Yes     Other Topics Concern    Not on file     Social History Narrative              MEDICATIONS:     Current Outpatient Prescriptions on File Prior to Visit   Medication Sig Dispense Refill    amLODIPine (NORVASC) 5 MG tablet Take 1 tablet (5 mg total) by mouth once daily. 30 tablet 3    mesalamine (APRISO) 0.375 gram Cp24 Take 4 capsules (1.5 g total) by mouth once daily. 120 capsule 5    ondansetron (ZOFRAN-ODT) 4 MG TbDL Take 1 tablet (4 mg total) by mouth every 8 (eight) hours as needed (nausea). 60 tablet 0    potassium chloride SA (K-DUR,KLOR-CON) 20 MEQ tablet Take 1 tablet (20 mEq total) by mouth once daily. 3 tablet 0    predniSONE (DELTASONE) 10 MG tablet Take 30 mg (3 tablets) daily from 2017 until appointment with gastroenterology clinic 90 tablet 0    [] vancomycin (VANCOCIN) 125 MG capsule Take 1 capsule (125 mg total) by mouth every 6 (six) hours. 52 capsule 0    vitamin D (VITAMIN D3) 1000 units Tab Take 1,000 Units by mouth once daily.       No current facility-administered medications on file prior to visit.        ALLERGIES:   Review of  patient's allergies indicates:   Allergen Reactions    Pcn [penicillins] Anaphylaxis    Zithromax [azithromycin] Rash        ROS:       Review of Systems   Constitutional: Positive for fatigue and unexpected weight change. Negative for diaphoresis and fever.   HENT:   Negative for lump/mass and sore throat.    Eyes: Negative for icterus.   Respiratory: Negative for cough and shortness of breath.    Cardiovascular: Negative for chest pain and palpitations.   Gastrointestinal: Positive for abdominal pain. Negative for abdominal distention, nausea and vomiting.   Genitourinary: Negative for dysuria and frequency.    Musculoskeletal: Negative for arthralgias, gait problem and myalgias.   Skin: Negative for rash.   Neurological: Negative for dizziness, gait problem and headaches.   Hematological: Negative for adenopathy. Does not bruise/bleed easily.   Psychiatric/Behavioral: The patient is not nervous/anxious.        PHYSICAL EXAM:  Vitals:    12/15/17 1317   BP: 115/71       Physical Exam   Constitutional: She is oriented to person, place, and time. She appears well-developed and well-nourished. No distress.   HENT:   Head: Normocephalic and atraumatic.   Mouth/Throat: Mucous membranes are normal. No oral lesions.   Eyes: Conjunctivae are normal.   Neck: No thyromegaly present.   Cardiovascular: Normal rate, regular rhythm and normal heart sounds.    No murmur heard.  Pulmonary/Chest: Breath sounds normal. She has no wheezes. She has no rales.   Abdominal: Soft. She exhibits no distension and no mass. There is no splenomegaly or hepatomegaly. There is no tenderness.   Lymphadenopathy:     She has no cervical adenopathy.        Right cervical: No deep cervical adenopathy present.       Left cervical: No deep cervical adenopathy present.     She has no axillary adenopathy.        Right: No inguinal adenopathy present.        Left: No inguinal adenopathy present.   Neurological: She is alert and oriented to person,  place, and time. She has normal strength and normal reflexes. No cranial nerve deficit. Coordination normal.   Skin: No rash noted.       LAB:   Results for orders placed or performed in visit on 12/11/17   CBC auto differential   Result Value Ref Range    WBC 12.74 (H) 3.90 - 12.70 K/uL    RBC 4.77 4.00 - 5.40 M/uL    Hemoglobin 13.2 12.0 - 16.0 g/dL    Hematocrit 41.6 37.0 - 48.5 %    MCV 87 82 - 98 fL    MCH 27.7 27.0 - 31.0 pg    MCHC 31.7 (L) 32.0 - 36.0 g/dL    RDW 13.6 11.5 - 14.5 %    Platelets 322 150 - 350 K/uL    MPV 9.4 9.2 - 12.9 fL    Immature Granulocytes 3.8 (H) 0.0 - 0.5 %    Gran # 10.9 (H) 1.8 - 7.7 K/uL    Immature Grans (Abs) 0.49 (H) 0.00 - 0.04 K/uL    Lymph # 1.1 1.0 - 4.8 K/uL    Mono # 0.2 (L) 0.3 - 1.0 K/uL    Eos # 0.0 0.0 - 0.5 K/uL    Baso # 0.05 0.00 - 0.20 K/uL    nRBC 0 0 /100 WBC    Gran% 85.8 (H) 38.0 - 73.0 %    Lymph% 8.4 (L) 18.0 - 48.0 %    Mono% 1.5 (L) 4.0 - 15.0 %    Eosinophil% 0.1 0.0 - 8.0 %    Basophil% 0.4 0.0 - 1.9 %    Differential Method Automated    Comprehensive metabolic panel   Result Value Ref Range    Sodium 139 136 - 145 mmol/L    Potassium 3.9 3.5 - 5.1 mmol/L    Chloride 100 95 - 110 mmol/L    CO2 29 23 - 29 mmol/L    Glucose 108 70 - 110 mg/dL    BUN, Bld 19 6 - 20 mg/dL    Creatinine 0.9 0.5 - 1.4 mg/dL    Calcium 9.3 8.7 - 10.5 mg/dL    Total Protein 7.2 6.0 - 8.4 g/dL    Albumin 3.4 (L) 3.5 - 5.2 g/dL    Total Bilirubin 0.4 0.1 - 1.0 mg/dL    Alkaline Phosphatase 95 55 - 135 U/L    AST 10 10 - 40 U/L    ALT 17 10 - 44 U/L    Anion Gap 10 8 - 16 mmol/L    eGFR if African American >60.0 >60 mL/min/1.73 m^2    eGFR if non African American >60.0 >60 mL/min/1.73 m^2   C-reactive protein   Result Value Ref Range    CRP 3.3 0.0 - 8.2 mg/L   T4, free   Result Value Ref Range    Free T4 0.80 0.71 - 1.51 ng/dL   TSH   Result Value Ref Range    TSH 1.744 0.400 - 4.000 uIU/mL   IgA   Result Value Ref Range    IgA <5 (L) 40 - 350 mg/dL   Tissue transglutaminase, IgA    Result Value Ref Range    TTG IgA 3 <20 UNITS   PREALBUMIN   Result Value Ref Range    Prealbumin 29 20 - 43 mg/dL   Tissue Transglutaminase IgG   Result Value Ref Range    TTG IgG 5 <20 UNITS       PROBLEMS ASSESSED THIS VISIT:    1. Basilic vein thrombosis    2. Ulcerative pancolitis    3. Essential hypertension        IMPRESSION:    1. Thrombus of the left basilic vein    2. Ulcerative colitis  3. HTN  4. Protein-calorie malnutrition      PLAN:       Basilic vein thrombosis  Ms. Bautista was recently diagnosed with a left basilic vein thrombus that is likely related to her IV insertion while hospitalized. We reviewed that upper extremity superficial vein thrombosis is usually self-limited and improves with supportive care. I advised continued elevation of the extremity and warm compresses. Often, anti-inflammatory agents such as NSAIDs or aspirin are recommended, but she reports that these agents are contra-indicated due to her UC.     Her thrombus appears to have nearly resolved at this point. Anticoagulation is not indicated as this is not a deep vein thrombosis. She is not at increased risk for DVT/PE based on a catheter-related thrombus alone, though I did advise her that she may have slightly elevated risk of thrombosis in general due to her underlying inflammatory bowel disease.    At this time, no further therapy is required (other than continued supportive care). She may follow-up in hematology clinic as needed.     Ulcerative pancolitis  On steroids. Managed by GI. They will monitor.     Essential hypertension  Well-controlled. Continue amlodipine and monitor.       Willy De Los Santos MD  Hematology and Stem Cell Transplant

## 2017-12-15 NOTE — LETTER
December 15, 2017      Magdalena Morales, [facility]  [address] [address]           [facility]  [address]  [place] [address]  Phone: [phone]          Patient: Ansley Bautista   MR Number: [id number]   YOB: 1965   Date of Visit: 12/15/2017       Dear Magdalena Morales:    Thank you for referring Ansley Bautista to me for evaluation. Attached you will find relevant portions of my assessment and plan of care.    If you have questions, please do not hesitate to call me. I look forward to following Ansley Bautista along with you.    Sincerely,    Willy De Los Santos MD    Enclosure  CC:  No Recipients    If you would like to receive this communication electronically, please contact [email or web address] or [phone] to request more information on [facility] access.    For providers and/or their staff who would like to refer a patient to [facility], please contact us through our one-stop-shop provider referral line, Lyla Valenzuela, at [phone].    If you feel you have received this communication in error or would no longer like to receive these types of communications, please e-mail [email or web address]

## 2017-12-18 ENCOUNTER — TELEPHONE (OUTPATIENT)
Dept: GASTROENTEROLOGY | Facility: CLINIC | Age: 52
End: 2017-12-18

## 2017-12-18 NOTE — TELEPHONE ENCOUNTER
Called pt and got her Dietician appoint rescheduled for 01/02/2018 @ 1:30    Appoint reminder mailed

## 2017-12-18 NOTE — TELEPHONE ENCOUNTER
----- Message from Joslyn Hayes sent at 12/18/2017  9:16 AM CST -----  Contact: self  Patient would like to reschedule dietician nutrition appointment cancel on 12/19/2017.    Patient can be reached at [phone]

## 2017-12-21 DIAGNOSIS — K51.30: ICD-10-CM

## 2017-12-21 RX ORDER — MESALAMINE 0.38 G/1
1.5 CAPSULE, EXTENDED RELEASE ORAL DAILY
Qty: 120 CAPSULE | Refills: 11 | Status: SHIPPED | OUTPATIENT
Start: 2017-12-21 | End: 2018-01-24 | Stop reason: SDUPTHER

## 2017-12-29 ENCOUNTER — TELEPHONE (OUTPATIENT)
Dept: GASTROENTEROLOGY | Facility: CLINIC | Age: 52
End: 2017-12-29

## 2017-12-29 NOTE — TELEPHONE ENCOUNTER
----- Message from Flori García sent at 12/29/2017 12:01 PM CST -----  Contact: Self- [phone]  Jillian- pt called to speak with staff has questions about what type of medicines she can take to help with a sore throat due to her UC- please call pt back at [phone]

## 2017-12-29 NOTE — TELEPHONE ENCOUNTER
Called and spoke with pt  I explained the only thing for a sore throat is Chloraseptic spray or Halls   She stated that twice a year she gets a throat infection and usually winds up on antibiotics so she is trying to stay ahead of it  She is taking Zyrtec and will rinse with warm salt water and get the spray or the Halls

## 2018-01-02 ENCOUNTER — PATIENT MESSAGE (OUTPATIENT)
Dept: GASTROENTEROLOGY | Facility: CLINIC | Age: 53
End: 2018-01-02

## 2018-01-02 ENCOUNTER — NUTRITION (OUTPATIENT)
Dept: GASTROENTEROLOGY | Facility: CLINIC | Age: 53
End: 2018-01-02
Payer: COMMERCIAL

## 2018-01-02 VITALS — BODY MASS INDEX: 22.07 KG/M2 | HEIGHT: 62 IN | WEIGHT: 119.94 LBS

## 2018-01-02 DIAGNOSIS — F41.9 ANXIETY: ICD-10-CM

## 2018-01-02 DIAGNOSIS — K51.00 ULCERATIVE PANCOLITIS: ICD-10-CM

## 2018-01-02 DIAGNOSIS — R10.84 GENERALIZED ABDOMINAL PAIN: ICD-10-CM

## 2018-01-02 DIAGNOSIS — E44.1 MILD PROTEIN-CALORIE MALNUTRITION: Primary | ICD-10-CM

## 2018-01-02 DIAGNOSIS — Z79.52 ON PREDNISONE THERAPY: ICD-10-CM

## 2018-01-02 DIAGNOSIS — R19.7 DIARRHEA, UNSPECIFIED TYPE: ICD-10-CM

## 2018-01-02 DIAGNOSIS — R10.9 ABDOMINAL CRAMPING: ICD-10-CM

## 2018-01-02 PROCEDURE — 99999 PR PBB SHADOW E&M-EST. PATIENT-LVL I: CPT | Mod: PBBFAC,,,
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
## 2018-01-24 ENCOUNTER — PATIENT MESSAGE (OUTPATIENT)
Dept: GASTROENTEROLOGY | Facility: CLINIC | Age: 53
End: 2018-01-24

## 2018-01-24 DIAGNOSIS — K51.30: ICD-10-CM

## 2018-01-24 RX ORDER — MESALAMINE 0.38 G/1
1.5 CAPSULE, EXTENDED RELEASE ORAL DAILY
Qty: 120 CAPSULE | Refills: 11 | Status: SHIPPED | OUTPATIENT
Start: 2018-01-24 | End: 2018-01-25 | Stop reason: SDUPTHER

## 2018-01-24 NOTE — TELEPHONE ENCOUNTER
Called [facility] and spoke with Olga  CX pts Apriso script     Sent pt a portal message letting her know we sent it to [facility] pharmacy

## 2018-01-24 NOTE — TELEPHONE ENCOUNTER
Called and spoke with pt  I explained there is no way for us to know what medication will be under $100  I told her everybody's insurance is difference and covers medications differently   I explained she needs to call her insurance company and see what alternative medications they recommend instead of Apriso.  She stated they will not know.  I told her yes they will.  They have a list of what is covered and not covered and what requires PA's   I told her I will send her message over to see if Dr Walsh and Magdalena and see what they say but I will not be back in touch until tomorrow morning.

## 2018-01-24 NOTE — TELEPHONE ENCOUNTER
Pt sent portal message requesting Apriso to be sent to [facility] in [place] because it is to expensive at other pharmacies.   Pended for approval   Allergies reviewed

## 2018-01-25 ENCOUNTER — PATIENT MESSAGE (OUTPATIENT)
Dept: GASTROENTEROLOGY | Facility: CLINIC | Age: 53
End: 2018-01-25

## 2018-01-25 ENCOUNTER — TELEPHONE (OUTPATIENT)
Dept: GASTROENTEROLOGY | Facility: CLINIC | Age: 53
End: 2018-01-25

## 2018-01-25 DIAGNOSIS — K51.00 ULCERATIVE PANCOLITIS: Primary | ICD-10-CM

## 2018-01-25 RX ORDER — BALSALAZIDE DISODIUM 750 MG/1
2250 CAPSULE ORAL 3 TIMES DAILY
Qty: 270 CAPSULE | Refills: 11 | Status: SHIPPED | OUTPATIENT
Start: 2018-01-25 | End: 2018-02-12 | Stop reason: ALTCHOICE

## 2018-01-25 RX ORDER — MESALAMINE 0.38 G/1
1.5 CAPSULE, EXTENDED RELEASE ORAL DAILY
Qty: 120 CAPSULE | Refills: 5 | Status: SHIPPED | OUTPATIENT
Start: 2018-01-25 | End: 2018-01-25 | Stop reason: ALTCHOICE

## 2018-01-25 RX ORDER — BALSALAZIDE DISODIUM 750 MG/1
2250 CAPSULE ORAL 3 TIMES DAILY
Qty: 90 CAPSULE | Refills: 11 | Status: SHIPPED | OUTPATIENT
Start: 2018-01-25 | End: 2018-01-25 | Stop reason: CLARIF

## 2018-01-25 NOTE — TELEPHONE ENCOUNTER
Called [facility] pharmacy in [place] and spoke with Vandana  I cancelled pts Apriso's script as it is still too expensive even with the coupon.    Pt would like us to send Generic Lialda to [facility]  Not pended for approval

## 2018-01-25 NOTE — TELEPHONE ENCOUNTER
Called and spoke with pt  I explained we got her messages and we sent her script to [facility].  If it is still to expensive at [facility] then we can try sending the Generic Lialda to [facility] but we can not have multiple scripts out.    I told her to send a portal message stating still to expensive if [facility] was not any cheaper and we will try the generic Lialda.  I also explained that it is the beginning of the year and unfortunately since she has not met her deductible I am not sure if any medication will be under $100    She apologized for sending multiple messages and I explained we are getting them but it takes time and there is a process to follow when it comes to scripts. I told her we will be in touch with her to please be patient  She expressed understanding and appreciated my call

## 2018-01-25 NOTE — TELEPHONE ENCOUNTER
Called and spoke with pt  I explained what was going on and that after speaking with [facility] the best medication to fit her price range is Colazal.  I explained with any new meds there is a chance that it may not work but we will cross that bridge if we need too.    I told her she can take 5 pills in the am and 4 pills in the pm if it is easier than 3 pills tid   She appreciated all my help with this

## 2018-01-25 NOTE — TELEPHONE ENCOUNTER
----- Message from Laci Aguilera sent at 1/25/2018 10:38 AM CST -----  Contact: Pt:[phone]  Pt called and states she needs to speak with the nurse in regards to her medication being to expensive.
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
## 2018-02-12 ENCOUNTER — OFFICE VISIT (OUTPATIENT)
Dept: GASTROENTEROLOGY | Facility: CLINIC | Age: 53
End: 2018-02-12
Payer: COMMERCIAL

## 2018-02-12 VITALS
BODY MASS INDEX: 22.96 KG/M2 | WEIGHT: 124.75 LBS | RESPIRATION RATE: 14 BRPM | SYSTOLIC BLOOD PRESSURE: 134 MMHG | TEMPERATURE: 98 F | HEIGHT: 62 IN | DIASTOLIC BLOOD PRESSURE: 87 MMHG | HEART RATE: 71 BPM

## 2018-02-12 DIAGNOSIS — F41.9 ANXIETY: ICD-10-CM

## 2018-02-12 DIAGNOSIS — I10 ESSENTIAL HYPERTENSION: ICD-10-CM

## 2018-02-12 DIAGNOSIS — K51.00 ULCERATIVE PANCOLITIS: Primary | ICD-10-CM

## 2018-02-12 DIAGNOSIS — Z91.89 HIGH RISK FOR COLON CANCER: ICD-10-CM

## 2018-02-12 DIAGNOSIS — R19.7 DIARRHEA, UNSPECIFIED TYPE: ICD-10-CM

## 2018-02-12 PROBLEM — Z79.52 ON PREDNISONE THERAPY: Status: RESOLVED | Noted: 2017-11-16 | Resolved: 2018-02-12

## 2018-02-12 PROBLEM — I82.619 BASILIC VEIN THROMBOSIS: Status: RESOLVED | Noted: 2017-12-11 | Resolved: 2018-02-12

## 2018-02-12 PROCEDURE — 99999 PR PBB SHADOW E&M-EST. PATIENT-LVL IV: CPT | Mod: PBBFAC,,, | Performed by: NURSE PRACTITIONER

## 2018-02-12 PROCEDURE — 99215 OFFICE O/P EST HI 40 MIN: CPT | Mod: S$GLB,,, | Performed by: NURSE PRACTITIONER

## 2018-02-12 PROCEDURE — 3008F BODY MASS INDEX DOCD: CPT | Mod: S$GLB,,, | Performed by: NURSE PRACTITIONER

## 2018-02-12 RX ORDER — MESALAMINE 0.38 G/1
1.5 CAPSULE, EXTENDED RELEASE ORAL DAILY
Qty: 120 CAPSULE | Refills: 11 | Status: SHIPPED | OUTPATIENT
Start: 2018-02-12 | End: 2019-02-25 | Stop reason: SDUPTHER

## 2018-02-12 NOTE — PATIENT INSTRUCTIONS
Instructions:  - stool studies--turn in Wednesday  - stop colazal  - start Apriso 1.5 gm/day (4 tablets) tomorrow--new RX [facility] to [facility]  - do not take any additional Imodium until we know C. Diff is negative  - Continue Vitamin D3 1000 IU dailly  - Avoid all NSAIDs (Advil, Ibuprofen, Motrin, Aspirin, Naprosyn, Aleve)  - Pap Smears recommended every 3 years  - Yearly Eye exam--due now  - Yearly Skin exam--wear sun block and hats--due now  - Use antibiotics with caution  - Vaccines recommended:      Flu shot yearly      Tetanus every 10 years      Hepatitis A series   - Follow up with MARK Nichols in 3 months with Dr. Walsh in clinic

## 2018-02-14 ENCOUNTER — PATIENT MESSAGE (OUTPATIENT)
Dept: GASTROENTEROLOGY | Facility: CLINIC | Age: 53
End: 2018-02-14

## 2018-02-16 ENCOUNTER — PATIENT MESSAGE (OUTPATIENT)
Dept: GASTROENTEROLOGY | Facility: CLINIC | Age: 53
End: 2018-02-16

## 2018-02-16 ENCOUNTER — TELEPHONE (OUTPATIENT)
Dept: GASTROENTEROLOGY | Facility: CLINIC | Age: 53
End: 2018-02-16

## 2018-02-16 DIAGNOSIS — A49.8 CLOSTRIDIUM DIFFICILE INFECTION: Primary | ICD-10-CM

## 2018-02-16 RX ORDER — VANCOMYCIN HYDROCHLORIDE 125 MG/1
125 CAPSULE ORAL 4 TIMES DAILY
Qty: 56 CAPSULE | Refills: 0 | Status: SHIPPED | OUTPATIENT
Start: 2018-02-16 | End: 2018-02-16

## 2018-02-16 NOTE — TELEPHONE ENCOUNTER
Spoke to patient re positive C. Diff results.  Will start oral vancomycin 125 mg QID for 14 days then will prescribe a vancomycin taper (I will call patient on 3/2/2018 to further discuss the taper and send in a new RX then).  Patient verbalizes understanding and agrees with the treatment plan.  Questions answered.    KG

## 2018-02-22 ENCOUNTER — PATIENT MESSAGE (OUTPATIENT)
Dept: GASTROENTEROLOGY | Facility: CLINIC | Age: 53
End: 2018-02-22

## 2018-02-22 ENCOUNTER — TELEPHONE (OUTPATIENT)
Dept: GASTROENTEROLOGY | Facility: CLINIC | Age: 53
End: 2018-02-22

## 2018-02-22 NOTE — TELEPHONE ENCOUNTER
----- Message from Anamika Archer MA sent at 2/22/2018 12:14 PM CST -----  Contact: [phone]  Carmen Flower asking to speak with Magdalena about her continuing stomach pain.    [phone]

## 2018-02-22 NOTE — TELEPHONE ENCOUNTER
Pt reports 6/10 cramping, abdominal pain. Pt states that in the past 2 days, her pain begun in the evening; however, pt reports the pain has been constant, today.    3 loose BM/day. No blood, no noc, afebrile.     Pt utilizes a heating pad, which decreases the pain to a 5/10.    I advised pt to go to ER for pain greater than 7/10. Pt verbalized understanding.     Current IBD Meds:  Mesalamine 1.5 g/day  Vancomycin 1 tsp QID    Next Appointment:  5/14/2018 with TANGELA Nunn

## 2018-02-23 ENCOUNTER — PATIENT MESSAGE (OUTPATIENT)
Dept: GASTROENTEROLOGY | Facility: CLINIC | Age: 53
End: 2018-02-23

## 2018-02-26 ENCOUNTER — PATIENT MESSAGE (OUTPATIENT)
Dept: GASTROENTEROLOGY | Facility: CLINIC | Age: 53
End: 2018-02-26

## 2018-02-28 ENCOUNTER — PATIENT MESSAGE (OUTPATIENT)
Dept: GASTROENTEROLOGY | Facility: CLINIC | Age: 53
End: 2018-02-28

## 2018-02-28 DIAGNOSIS — A04.72 C. DIFFICILE COLITIS: ICD-10-CM

## 2018-02-28 DIAGNOSIS — R10.9 ABDOMINAL CRAMPING: ICD-10-CM

## 2018-02-28 DIAGNOSIS — K51.00 ULCERATIVE PANCOLITIS: ICD-10-CM

## 2018-02-28 DIAGNOSIS — F41.9 ANXIETY: Primary | ICD-10-CM

## 2018-03-01 ENCOUNTER — PATIENT MESSAGE (OUTPATIENT)
Dept: GASTROENTEROLOGY | Facility: CLINIC | Age: 53
End: 2018-03-01

## 2018-03-01 DIAGNOSIS — K51.00 ULCERATIVE PANCOLITIS: Primary | ICD-10-CM

## 2018-03-01 RX ORDER — HYOSCYAMINE SULFATE 0.12 MG/1
0.25 TABLET SUBLINGUAL EVERY 6 HOURS PRN
Qty: 240 TABLET | Refills: 0 | Status: ON HOLD | OUTPATIENT
Start: 2018-03-01 | End: 2018-03-14

## 2018-03-01 RX ORDER — MESALAMINE 1000 MG/1
1000 SUPPOSITORY RECTAL NIGHTLY
Qty: 30 SUPPOSITORY | Refills: 3 | Status: SHIPPED | OUTPATIENT
Start: 2018-03-01 | End: 2018-03-31

## 2018-03-01 NOTE — TELEPHONE ENCOUNTER
"Called patient to further discuss symptoms to make some medical decisions.  She was having 4 soft BMs/day and yesterday had increase gas and number of bowel movements though also some tenesmus symptoms with mucous and blood when she wiped. Increased flatulence as well.  She was due to taper her oral vancomycin but due to these worsening of symptoms I have asked her not to do so.      Plan  - do not taper vanco and stay on current dose for another 2 weeks with plans for repeat stool C diff in 2 weeks  - continue apriso  - start canasa supp qhs  - will hold off on starting any uceris or prednisone due to risk with ongoing c diff  - stop bentyl and pt requesting levsin; offered tramadol but pt prefers not to start anything "too strong"  - all prescriptions sent to [facility] on file  - does not have f/u scheduled but will likely need one within a month, to be determined based on symptoms and progress   - pt to update us by email or phone in 1 week  "

## 2018-03-01 NOTE — TELEPHONE ENCOUNTER
Spoke with balaji at [facility] and he was able to get a coupon to drop it to $83.04   Sent pt portal message asking if this was affordable

## 2018-03-01 NOTE — TELEPHONE ENCOUNTER
Spoke to patient re symptoms.  Patient states starting yesterday she feels like she is in a flare.  She is having a lot of increased stress with her children as well.  She is having 8-9 soft - loose BMs/day of those 8-9, 2-3 are soft brown with no blood BMs and the rest are tenesmus with gas and blood on the tissue after wiping with no BMs.  She is experiencing some abdominal cramping prior to BMs and urgency.  Currently she is on Apriso 1.5 gm/day, Bentyl PRN, and vancomycin 125 mg QID with plans to start tapering tomorrow.  Explained that I will discuss with Dr. Walsh and will get back to her this afternoon with a plan.  She also requested a referral for anxiety so I will place a referral to Dr. Nicole.  I will also send an email with the vanco taper instructions and recommending Florastor probiotic.  Patient verbalizes understanding and agrees with the treatment plan.  Questions answered.    KG

## 2018-03-01 NOTE — TELEPHONE ENCOUNTER
Printed the lab orders for the stool studies  Wrote 03/15 at top so pt knows when it is due  Put in envelope along with 2 stool containers and a note explaining to pt if the first stool specimen is loose then she only needs 1 container and can save the other container for a later date.    Mailed to pt

## 2018-03-02 ENCOUNTER — TELEPHONE (OUTPATIENT)
Dept: PSYCHIATRY | Facility: CLINIC | Age: 53
End: 2018-03-02

## 2018-03-02 NOTE — TELEPHONE ENCOUNTER
Ms. Bautista was referred to treatment by TANGELA Nunn. She noted that she is not interested in psychotropic medication or psychotherapy at this time.  She is unsure of her mental health coverage and cannot afford sessions right now.  She was encouraged to contact patient account services or her insurance to determine coverage.  She may try the  support group since it is free and may also try to do holistic treatments before considering therapy.  She was encouraged to contact me in the future if she needs services.

## 2018-03-05 ENCOUNTER — PATIENT MESSAGE (OUTPATIENT)
Dept: GASTROENTEROLOGY | Facility: CLINIC | Age: 53
End: 2018-03-05

## 2018-03-05 NOTE — TELEPHONE ENCOUNTER
Called and spoke with pt  I explained she had spoken with both Magdalena and then Dr Walsh and was given the treatment plan so I did not call her to let her know I was mailing the stool containers.    She stated she did not know when to collect the stool or what medication to take.  I told her I wrote the date at the top of the lab orders I believe it is due on 03/15 for the stool and I went over the medication plan from Dr Walsh.    I asked if she was following the medication plan and she stated yes she is.  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   She wants to know if there is a special diet she should follow to help with the symptoms. I told her I was not sure about diet and that I would send message.  She also stated before hanging up that she had to pay postage for the stool containers.  $2 and some change.  I am not sure why she was charged but I told her I would bring that to the attention of my supervisor.  I explained we will be in touch but it will be at the END of the day  She expressed understanding

## 2018-03-05 NOTE — TELEPHONE ENCOUNTER
Called and spoke with pt  I explained for her to try a low residue/fiber diet.  I told her we sent in Levsin for her on 03/01 to help with the abdominal cramping and received confirmation of receipt so they should have it.  I also apologized for the postage situation.  I explained that this is the first we heard about a pt being charged at time of delivery for something mailed from us.  She stated she is not wanting a refund she just wanted us to be aware.

## 2018-03-05 NOTE — TELEPHONE ENCOUNTER
She can try a low residue/fiber diet.  In regards to the stool postage she is local and its best for her to drop off the stool specimen to get it processed faster rather than mailing it.

## 2018-03-09 ENCOUNTER — PATIENT MESSAGE (OUTPATIENT)
Dept: GASTROENTEROLOGY | Facility: CLINIC | Age: 53
End: 2018-03-09

## 2018-03-09 DIAGNOSIS — K51.00 ULCERATIVE PANCOLITIS: Primary | ICD-10-CM

## 2018-03-09 DIAGNOSIS — A04.72 C. DIFFICILE COLITIS: ICD-10-CM

## 2018-03-12 ENCOUNTER — PATIENT MESSAGE (OUTPATIENT)
Dept: GASTROENTEROLOGY | Facility: CLINIC | Age: 53
End: 2018-03-12

## 2018-03-12 DIAGNOSIS — K51.00 ULCERATIVE PANCOLITIS: Primary | ICD-10-CM

## 2018-03-12 DIAGNOSIS — R19.7 DIARRHEA, UNSPECIFIED TYPE: ICD-10-CM

## 2018-03-12 DIAGNOSIS — R10.84 GENERALIZED ABDOMINAL PAIN: ICD-10-CM

## 2018-03-12 NOTE — TELEPHONE ENCOUNTER
Pt is scheduled for blood work at [facility], tomorrow. Pt is aware that stool sample is due on 3/16/2018.

## 2018-03-12 NOTE — TELEPHONE ENCOUNTER
Spoke to patient re possible worsening symptoms.  She thinks she is getting worse.  Having 8-10 BMs.day and today noticed that her BMs were green.  Also wondering if the vancomycin could be making her worse and if she should be changed to Dificid.  She has been on Vancomycin 500 mg BID for 3 days total.  Discussed with Dr. Walsh and we can give a  Little longer on the oral vancomycin or she can come to the ER to be admitted for IV vancomycin and oral hydration.  Patient would like to continue outpatient PO vancomycin for 2 more days.  She will give us an update on Wednesday and if no better will come in for admission.  Patient verbalizes understanding and agrees with the treatment plan.  Questions answered.    KG

## 2018-03-13 ENCOUNTER — PATIENT MESSAGE (OUTPATIENT)
Dept: GASTROENTEROLOGY | Facility: CLINIC | Age: 53
End: 2018-03-13

## 2018-03-13 ENCOUNTER — OFFICE VISIT (OUTPATIENT)
Dept: GASTROENTEROLOGY | Facility: CLINIC | Age: 53
DRG: 387 | End: 2018-03-13
Payer: COMMERCIAL

## 2018-03-13 ENCOUNTER — HOSPITAL ENCOUNTER (INPATIENT)
Facility: HOSPITAL | Age: 53
LOS: 4 days | Discharge: HOME | DRG: 387 | End: 2018-03-17
Attending: HOSPITALIST | Admitting: INTERNAL MEDICINE
Payer: COMMERCIAL

## 2018-03-13 VITALS
HEIGHT: 62 IN | TEMPERATURE: 98 F | SYSTOLIC BLOOD PRESSURE: 141 MMHG | DIASTOLIC BLOOD PRESSURE: 99 MMHG | RESPIRATION RATE: 16 BRPM | WEIGHT: 115.5 LBS | BODY MASS INDEX: 21.25 KG/M2 | HEART RATE: 72 BPM

## 2018-03-13 DIAGNOSIS — A04.71 RECURRENT CLOSTRIDIUM DIFFICILE DIARRHEA: ICD-10-CM

## 2018-03-13 DIAGNOSIS — F41.9 ANXIETY: ICD-10-CM

## 2018-03-13 DIAGNOSIS — I10 ESSENTIAL HYPERTENSION: ICD-10-CM

## 2018-03-13 DIAGNOSIS — K51.00 ULCERATIVE PANCOLITIS: Primary | ICD-10-CM

## 2018-03-13 DIAGNOSIS — K51.011 ULCERATIVE PANCOLITIS WITH RECTAL BLEEDING: ICD-10-CM

## 2018-03-13 DIAGNOSIS — A04.71 RECURRENT COLITIS DUE TO CLOSTRIDIUM DIFFICILE: ICD-10-CM

## 2018-03-13 DIAGNOSIS — K51.90 ULCERATIVE COLITIS: ICD-10-CM

## 2018-03-13 DIAGNOSIS — R79.82 ELEVATED C-REACTIVE PROTEIN (CRP): ICD-10-CM

## 2018-03-13 DIAGNOSIS — R19.7 DIARRHEA, UNSPECIFIED TYPE: Primary | ICD-10-CM

## 2018-03-13 DIAGNOSIS — K51.00 ULCERATIVE PANCOLITIS: ICD-10-CM

## 2018-03-13 PROBLEM — R10.84 GENERALIZED ABDOMINAL PAIN: Status: RESOLVED | Noted: 2017-11-28 | Resolved: 2018-03-13

## 2018-03-13 PROCEDURE — 11000001 HC ACUTE MED/SURG PRIVATE ROOM

## 2018-03-13 PROCEDURE — 99223 1ST HOSP IP/OBS HIGH 75: CPT | Mod: AI,,, | Performed by: HOSPITALIST

## 2018-03-13 PROCEDURE — 99999 PR PBB SHADOW E&M-EST. PATIENT-LVL IV: CPT | Mod: PBBFAC,,, | Performed by: NURSE PRACTITIONER

## 2018-03-13 PROCEDURE — 25000003 PHARM REV CODE 250: Performed by: INTERNAL MEDICINE

## 2018-03-13 PROCEDURE — 99215 OFFICE O/P EST HI 40 MIN: CPT | Mod: S$GLB,,, | Performed by: NURSE PRACTITIONER

## 2018-03-13 RX ORDER — ACETAMINOPHEN 325 MG/1
650 TABLET ORAL EVERY 4 HOURS PRN
Status: DISCONTINUED | OUTPATIENT
Start: 2018-03-13 | End: 2018-03-17 | Stop reason: HOSPADM

## 2018-03-13 RX ORDER — ONDANSETRON 8 MG/1
8 TABLET, ORALLY DISINTEGRATING ORAL EVERY 8 HOURS PRN
Status: DISCONTINUED | OUTPATIENT
Start: 2018-03-13 | End: 2018-03-17 | Stop reason: HOSPADM

## 2018-03-13 RX ORDER — MESALAMINE 0.38 G/1
1.5 CAPSULE, EXTENDED RELEASE ORAL DAILY
Status: DISCONTINUED | OUTPATIENT
Start: 2018-03-14 | End: 2018-03-17 | Stop reason: HOSPADM

## 2018-03-13 RX ORDER — SODIUM CHLORIDE 0.9 % (FLUSH) 0.9 %
5 SYRINGE (ML) INJECTION
Status: DISCONTINUED | OUTPATIENT
Start: 2018-03-13 | End: 2018-03-17 | Stop reason: HOSPADM

## 2018-03-13 RX ORDER — AMLODIPINE BESYLATE 5 MG/1
5 TABLET ORAL DAILY
Status: DISCONTINUED | OUTPATIENT
Start: 2018-03-14 | End: 2018-03-17 | Stop reason: HOSPADM

## 2018-03-13 RX ORDER — BUTYROSPERMUM PARKII(SHEA BUTTER), SIMMONDSIA CHINENSIS (JOJOBA) SEED OIL, ALOE BARBADENSIS LEAF EXTRACT .01; 1; 3.5 G/100G; G/100G; G/100G
500 LIQUID TOPICAL DAILY
COMMUNITY
End: 2018-04-19

## 2018-03-13 RX ORDER — ENOXAPARIN SODIUM 100 MG/ML
40 INJECTION SUBCUTANEOUS EVERY 24 HOURS
Status: DISCONTINUED | OUTPATIENT
Start: 2018-03-13 | End: 2018-03-17 | Stop reason: HOSPADM

## 2018-03-13 RX ORDER — MESALAMINE 1000 MG/1
1000 SUPPOSITORY RECTAL NIGHTLY
Status: DISCONTINUED | OUTPATIENT
Start: 2018-03-13 | End: 2018-03-17 | Stop reason: HOSPADM

## 2018-03-13 RX ORDER — CHOLECALCIFEROL (VITAMIN D3) 25 MCG
1000 TABLET ORAL DAILY
Status: DISCONTINUED | OUTPATIENT
Start: 2018-03-14 | End: 2018-03-17 | Stop reason: HOSPADM

## 2018-03-13 RX ADMIN — MESALAMINE 1000 MG: 1000 SUPPOSITORY RECTAL at 10:03

## 2018-03-13 RX ADMIN — Medication 500 MG: at 10:03
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
## 2018-03-13 NOTE — TELEPHONE ENCOUNTER
Contacted direct admit office.  Need to have seen patient within last 72 hours for direct admit.  Will have patient come in for a clinic visit today then call back to discuss admission with Medicine team.  Admitting time calls are 11 am, 2 pm, and 4 pm.    Called patient to give her an update.  She cannot make it here for 10:40 am.  Instructed her to come today for 2:30 and we will see her in clinic then will call to have her admitted.  If anything changes, will call her back.    Will have staff place on my schedule for 3PM to be seen in clinic.    Magdalena

## 2018-03-13 NOTE — PATIENT INSTRUCTIONS
Instructions:  - will admit to hospital Medicine, spoke to Dr. Berry  - CBC, CMP, CRP pending  - stool C. Diff, stool calprotectin, stool O/P, stool giardia/crypto, and stool culture--patient to turn in now  - consult GI  - plan for flex sig inpatient  - Follow up to be determined after discharge
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
## 2018-03-14 ENCOUNTER — ANESTHESIA (OUTPATIENT)
Dept: ENDOSCOPY | Facility: HOSPITAL | Age: 53
DRG: 387 | End: 2018-03-14
Payer: COMMERCIAL

## 2018-03-14 ENCOUNTER — ANESTHESIA EVENT (OUTPATIENT)
Dept: ENDOSCOPY | Facility: HOSPITAL | Age: 53
DRG: 387 | End: 2018-03-14
Payer: COMMERCIAL

## 2018-03-14 ENCOUNTER — SURGERY (OUTPATIENT)
Age: 53
End: 2018-03-14

## 2018-03-14 LAB
ALBUMIN SERPL BCP-MCNC: 3.5 G/DL
ALP SERPL-CCNC: 86 U/L
ALT SERPL W/O P-5'-P-CCNC: 8 U/L
ANION GAP SERPL CALC-SCNC: 7 MMOL/L
AST SERPL-CCNC: 11 U/L
BASOPHILS # BLD AUTO: 0.04 K/UL
BASOPHILS NFR BLD: 0.6 %
BILIRUB SERPL-MCNC: 0.5 MG/DL
BUN SERPL-MCNC: 8 MG/DL
CALCIUM SERPL-MCNC: 8.9 MG/DL
CHLORIDE SERPL-SCNC: 106 MMOL/L
CO2 SERPL-SCNC: 27 MMOL/L
CREAT SERPL-MCNC: 0.9 MG/DL
DIFFERENTIAL METHOD: ABNORMAL
EOSINOPHIL # BLD AUTO: 0.1 K/UL
EOSINOPHIL NFR BLD: 2 %
ERYTHROCYTE [DISTWIDTH] IN BLOOD BY AUTOMATED COUNT: 12.1 %
EST. GFR  (AFRICAN AMERICAN): >60 ML/MIN/1.73 M^2
EST. GFR  (NON AFRICAN AMERICAN): >60 ML/MIN/1.73 M^2
GLUCOSE SERPL-MCNC: 95 MG/DL
HCT VFR BLD AUTO: 42 %
HGB BLD-MCNC: 13.8 G/DL
IMM GRANULOCYTES # BLD AUTO: 0.05 K/UL
IMM GRANULOCYTES NFR BLD AUTO: 0.7 %
LYMPHOCYTES # BLD AUTO: 1.5 K/UL
LYMPHOCYTES NFR BLD: 21.6 %
MAGNESIUM SERPL-MCNC: 1.9 MG/DL
MCH RBC QN AUTO: 27.4 PG
MCHC RBC AUTO-ENTMCNC: 32.9 G/DL
MCV RBC AUTO: 84 FL
MONOCYTES # BLD AUTO: 0.8 K/UL
MONOCYTES NFR BLD: 10.8 %
NEUTROPHILS # BLD AUTO: 4.5 K/UL
NEUTROPHILS NFR BLD: 64.3 %
NRBC BLD-RTO: 0 /100 WBC
PHOSPHATE SERPL-MCNC: 4 MG/DL
PLATELET # BLD AUTO: 241 K/UL
PMV BLD AUTO: 10.4 FL
POTASSIUM SERPL-SCNC: 3.7 MMOL/L
PROT SERPL-MCNC: 7 G/DL
RBC # BLD AUTO: 5.03 M/UL
SODIUM SERPL-SCNC: 140 MMOL/L
WBC # BLD AUTO: 6.95 K/UL

## 2018-03-14 PROCEDURE — 63600175 PHARM REV CODE 636 W HCPCS: Performed by: NURSE ANESTHETIST, CERTIFIED REGISTERED

## 2018-03-14 PROCEDURE — 0DBM8ZX EXCISION OF DESCENDING COLON, VIA NATURAL OR ARTIFICIAL OPENING ENDOSCOPIC, DIAGNOSTIC: ICD-10-PCS | Performed by: INTERNAL MEDICINE

## 2018-03-14 PROCEDURE — 37000008 HC ANESTHESIA 1ST 15 MINUTES: Performed by: INTERNAL MEDICINE

## 2018-03-14 PROCEDURE — 25000003 PHARM REV CODE 250: Performed by: INTERNAL MEDICINE

## 2018-03-14 PROCEDURE — 80053 COMPREHEN METABOLIC PANEL: CPT

## 2018-03-14 PROCEDURE — 83735 ASSAY OF MAGNESIUM: CPT

## 2018-03-14 PROCEDURE — D9220A PRA ANESTHESIA: Mod: CRNA,,, | Performed by: NURSE ANESTHETIST, CERTIFIED REGISTERED

## 2018-03-14 PROCEDURE — 27201012 HC FORCEPS, HOT/COLD, DISP: Performed by: INTERNAL MEDICINE

## 2018-03-14 PROCEDURE — 63600175 PHARM REV CODE 636 W HCPCS: Performed by: SURGERY

## 2018-03-14 PROCEDURE — 88305 TISSUE EXAM BY PATHOLOGIST: CPT | Mod: 26,,, | Performed by: PATHOLOGY

## 2018-03-14 PROCEDURE — 88305 TISSUE EXAM BY PATHOLOGIST: CPT | Performed by: PATHOLOGY

## 2018-03-14 PROCEDURE — 45331 SIGMOIDOSCOPY AND BIOPSY: CPT | Mod: ,,, | Performed by: INTERNAL MEDICINE

## 2018-03-14 PROCEDURE — 0DBP8ZX EXCISION OF RECTUM, VIA NATURAL OR ARTIFICIAL OPENING ENDOSCOPIC, DIAGNOSTIC: ICD-10-PCS | Performed by: INTERNAL MEDICINE

## 2018-03-14 PROCEDURE — D9220A PRA ANESTHESIA: Mod: ANES,,, | Performed by: ANESTHESIOLOGY

## 2018-03-14 PROCEDURE — 11000001 HC ACUTE MED/SURG PRIVATE ROOM

## 2018-03-14 PROCEDURE — 37000009 HC ANESTHESIA EA ADD 15 MINS: Performed by: INTERNAL MEDICINE

## 2018-03-14 PROCEDURE — 84100 ASSAY OF PHOSPHORUS: CPT

## 2018-03-14 PROCEDURE — 25000003 PHARM REV CODE 250: Performed by: SURGERY

## 2018-03-14 PROCEDURE — 36415 COLL VENOUS BLD VENIPUNCTURE: CPT

## 2018-03-14 PROCEDURE — 45331 SIGMOIDOSCOPY AND BIOPSY: CPT | Performed by: INTERNAL MEDICINE

## 2018-03-14 PROCEDURE — 25000003 PHARM REV CODE 250: Performed by: STUDENT IN AN ORGANIZED HEALTH CARE EDUCATION/TRAINING PROGRAM

## 2018-03-14 PROCEDURE — 25000003 PHARM REV CODE 250: Performed by: NURSE ANESTHETIST, CERTIFIED REGISTERED

## 2018-03-14 PROCEDURE — 0DBN8ZX EXCISION OF SIGMOID COLON, VIA NATURAL OR ARTIFICIAL OPENING ENDOSCOPIC, DIAGNOSTIC: ICD-10-PCS | Performed by: INTERNAL MEDICINE

## 2018-03-14 PROCEDURE — 88342 IMHCHEM/IMCYTCHM 1ST ANTB: CPT | Mod: 26,,, | Performed by: PATHOLOGY

## 2018-03-14 PROCEDURE — 85025 COMPLETE CBC W/AUTO DIFF WBC: CPT

## 2018-03-14 PROCEDURE — 99233 SBSQ HOSP IP/OBS HIGH 50: CPT | Mod: ,,, | Performed by: HOSPITALIST

## 2018-03-14 PROCEDURE — 99253 IP/OBS CNSLTJ NEW/EST LOW 45: CPT | Mod: 25,,, | Performed by: INTERNAL MEDICINE

## 2018-03-14 RX ORDER — SODIUM CHLORIDE 9 MG/ML
INJECTION, SOLUTION INTRAVENOUS CONTINUOUS PRN
Status: DISCONTINUED | OUTPATIENT
Start: 2018-03-14 | End: 2018-03-14

## 2018-03-14 RX ORDER — LIDOCAINE HCL/PF 100 MG/5ML
SYRINGE (ML) INTRAVENOUS
Status: DISCONTINUED | OUTPATIENT
Start: 2018-03-14 | End: 2018-03-14

## 2018-03-14 RX ORDER — BIOTIN 5 MG
1 CAPSULE ORAL DAILY
COMMUNITY
End: 2018-07-25

## 2018-03-14 RX ORDER — SODIUM CHLORIDE, SODIUM LACTATE, POTASSIUM CHLORIDE, CALCIUM CHLORIDE 600; 310; 30; 20 MG/100ML; MG/100ML; MG/100ML; MG/100ML
INJECTION, SOLUTION INTRAVENOUS CONTINUOUS
Status: ACTIVE | OUTPATIENT
Start: 2018-03-14 | End: 2018-03-14

## 2018-03-14 RX ORDER — PROPOFOL 10 MG/ML
VIAL (ML) INTRAVENOUS
Status: DISCONTINUED | OUTPATIENT
Start: 2018-03-14 | End: 2018-03-14

## 2018-03-14 RX ORDER — SODIUM CHLORIDE 9 MG/ML
INJECTION, SOLUTION INTRAVENOUS CONTINUOUS
Status: DISCONTINUED | OUTPATIENT
Start: 2018-03-14 | End: 2018-03-17 | Stop reason: HOSPADM

## 2018-03-14 RX ADMIN — METHYLPREDNISOLONE SODIUM SUCCINATE 40 MG: 40 INJECTION, POWDER, FOR SOLUTION INTRAMUSCULAR; INTRAVENOUS at 12:03

## 2018-03-14 RX ADMIN — Medication 125 MG: at 05:03

## 2018-03-14 RX ADMIN — PROPOFOL 100 MG: 10 INJECTION, EMULSION INTRAVENOUS at 11:03

## 2018-03-14 RX ADMIN — MESALAMINE 1.5 G: 0.38 CAPSULE, EXTENDED RELEASE ORAL at 12:03

## 2018-03-14 RX ADMIN — VITAMIN D, TAB 1000IU (100/BT) 1000 UNITS: 25 TAB at 09:03

## 2018-03-14 RX ADMIN — PROPOFOL 30 MG: 10 INJECTION, EMULSION INTRAVENOUS at 11:03

## 2018-03-14 RX ADMIN — SODIUM PHOSPHATE, DIBASIC AND SODIUM PHOSPHATE, MONOBASIC 1 ENEMA: 7; 19 ENEMA RECTAL at 10:03

## 2018-03-14 RX ADMIN — SODIUM CHLORIDE, POTASSIUM CHLORIDE, SODIUM LACTATE AND CALCIUM CHLORIDE: 600; 310; 30; 20 INJECTION, SOLUTION INTRAVENOUS at 12:03

## 2018-03-14 RX ADMIN — MESALAMINE 1000 MG: 1000 SUPPOSITORY RECTAL at 09:03

## 2018-03-14 RX ADMIN — SODIUM CHLORIDE: 0.9 INJECTION, SOLUTION INTRAVENOUS at 11:03

## 2018-03-14 RX ADMIN — LIDOCAINE HYDROCHLORIDE 30 MG: 20 INJECTION, SOLUTION INTRAVENOUS at 11:03

## 2018-03-14 RX ADMIN — AMLODIPINE BESYLATE 5 MG: 5 TABLET ORAL at 09:03

## 2018-03-14 RX ADMIN — Medication 500 MG: at 09:03

## 2018-03-14 NOTE — ASSESSMENT & PLAN NOTE
- first tested positive for C Diff in 11/2017, treated with steroid and oral vanc  - 2/16/2018: positive C. Diff, started oral vancomycin 125 mg QID, increased to 500 mg PO TID on 3/9 due to persistent   - Currently afebrile with no leykocytosis  - Continue oral vanc 500 mg TID  - Repeat C Diff studies from today pending   - Other stool studies pending   - Contact precautions
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
## 2018-03-14 NOTE — PLAN OF CARE
Problem: Patient Care Overview  Goal: Plan of Care Review  Outcome: Ongoing (interventions implemented as appropriate)  Patient AAOx4. Patient VSS. Patient denies pain. Patient free from falls or injury during shift. Patient repositioned freely. Patient in bed, bed in lowest position, call light in reach, bed alarm set, and personal items at bedside. Will continue to monitor.

## 2018-03-14 NOTE — SUBJECTIVE & OBJECTIVE
Past Medical History:   Diagnosis Date    Colon polyp        Past Surgical History:   Procedure Laterality Date    bilateral knee scopes      breast augumentation      COLONOSCOPY      cyst removal off of breast      PARTIAL HYSTERECTOMY      surgery on both feet         Review of patient's allergies indicates:   Allergen Reactions    Pcn [penicillins] Anaphylaxis    Zithromax [azithromycin] Rash       No current facility-administered medications on file prior to encounter.      Current Outpatient Prescriptions on File Prior to Encounter   Medication Sig    amLODIPine (NORVASC) 5 MG tablet Take 1 tablet (5 mg total) by mouth once daily.    hyoscyamine (LEVSIN/SL) 0.125 mg Subl Place 2 tablets (0.25 mg total) under the tongue every 6 (six) hours as needed.    mesalamine (APRISO) 0.375 gram Cp24 Take 4 capsules (1.5 g total) by mouth once daily.    mesalamine (CANASA) 1000 MG Supp Place 1 suppository (1,000 mg total) rectally nightly.    ondansetron (ZOFRAN-ODT) 4 MG TbDL Take 1 tablet (4 mg total) by mouth every 8 (eight) hours as needed (nausea).    potassium chloride SA (K-DUR,KLOR-CON) 20 MEQ tablet Take 1 tablet (20 mEq total) by mouth once daily.    vancomycin oral solution 25mg/mL 500 mg (20mL) by mouth three times daily    vitamin D (VITAMIN D3) 1000 units Tab Take 1,000 Units by mouth once daily.     Family History     Problem Relation (Age of Onset)    Diabetes Father    Diverticulitis Mother    Heart disease Father    Hypertension Father, Sister, Brother        Social History Main Topics    Smoking status: Former Smoker    Smokeless tobacco: Never Used      Comment: quit around 2003; smoked when drank, not daily    Alcohol use 0.6 - 1.2 oz/week     1 - 2 Glasses of wine per week      Comment: 1-2 in a month    Drug use: No    Sexual activity: Yes     Review of Systems   Constitutional: Negative for chills and fever.   HENT: Negative for trouble swallowing.    Eyes: Negative for visual  disturbance.   Respiratory: Negative for cough and shortness of breath.    Cardiovascular: Negative for chest pain and leg swelling.   Gastrointestinal: Positive for abdominal pain, blood in stool and diarrhea. Negative for abdominal distention, nausea and vomiting.   Genitourinary: Negative for dysuria and hematuria.   Musculoskeletal: Negative for arthralgias and myalgias.   Neurological: Negative for dizziness, weakness, light-headedness and headaches.     Objective:     Vital Signs (Most Recent):  Temp: 98.9 °F (37.2 °C) (03/13/18 1950)  Pulse: 80 (03/13/18 1950)  Resp: 18 (03/13/18 1950)  BP: (!) 160/89 (03/13/18 1950)  SpO2: 98 % (03/13/18 1950) Vital Signs (24h Range):  Temp:  [97.7 °F (36.5 °C)-98.9 °F (37.2 °C)] 98.9 °F (37.2 °C)  Pulse:  [67-80] 80  Resp:  [16-18] 18  SpO2:  [98 %] 98 %  BP: (141-169)/() 160/89        There is no height or weight on file to calculate BMI.    Physical Exam   Constitutional: She is oriented to person, place, and time. She appears well-developed and well-nourished.   HENT:   Head: Normocephalic and atraumatic.   Right Ear: External ear normal.   Left Ear: External ear normal.   Eyes: EOM are normal.   Neck: Neck supple.   Cardiovascular: Normal rate, regular rhythm, normal heart sounds and intact distal pulses.    Pulmonary/Chest: Effort normal and breath sounds normal.   Abdominal: Soft. Bowel sounds are normal. She exhibits no distension. There is tenderness (mild lower abdominal tenderness). There is no guarding.   Musculoskeletal: She exhibits no edema.   Neurological: She is alert and oriented to person, place, and time.   Skin: Skin is warm and dry. Capillary refill takes less than 2 seconds.   Psychiatric: She has a normal mood and affect. Her behavior is normal.         CRANIAL NERVES     CN III, IV, VI   Extraocular motions are normal.        Significant Labs: All pertinent labs within the past 24 hours have been reviewed.    Significant Imaging: I have  reviewed all pertinent imaging results/findings within the past 24 hours.

## 2018-03-14 NOTE — HOSPITAL COURSE
Ansley Bautista was admitted to  1 from gastroenterology clinic for increased abdominal cramping and frequent loose bowel movements.  Gastroenterology was consulted.  On 3/14/2018 she had a flexible sigmoidoscopy which demonstrated moderate active colitis with linear ulcerations from the anal verge to descending colon.  GI recommending starting solumedrol post procedure while continuing oral vancomycin even with negative C diff since she has had recurrence.  Doing well post-procedurally on steroids.  On hospital day 4 her CRP normalized and she was deemed stable for discharge home with prednisone and oral vancomycin.    Discharge ROS:  Constitutional: Positive for activity change and appetite change. Negative for chills and fever.   HENT: Negative for sore throat and trouble swallowing.    Respiratory: Negative for cough and shortness of breath.    Cardiovascular: Negative for chest pain and leg swelling.   Gastrointestinal: Positive for loose stools. Negative for abdominal distention, constipation and nausea.        +abdominal cramping which is improved  Genitourinary: Negative for decreased urine volume and difficulty urinating.   Musculoskeletal: Negative for arthralgias and back pain.   Skin: Negative for color change and pallor.   Neurological: Negative for dizziness and weakness.   Psychiatric/Behavioral: Negative for agitation and confusion. The patient is not nervous/anxious.    Discharge Physical Exam:  Constitutional: She is oriented to person, place, and time. She appears well-developed and well-nourished.   HENT:   Head: Normocephalic and atraumatic.   Right Ear: External ear normal.   Left Ear: External ear normal.   Eyes: EOM are normal. Pupils are equal, round, and reactive to light. No scleral icterus.   Neck: Neck supple.   Cardiovascular: Normal rate, regular rhythm, normal heart sounds and intact distal pulses.    Pulmonary/Chest: Effort normal and breath sounds normal.   Abdominal: Soft. Bowel  sounds are normal. She exhibits no distension. There is no tenderness. There is no guarding.   Musculoskeletal: She exhibits no edema.   Neurological: She is alert and oriented to person, place, and time. No cranial nerve deficit or sensory deficit. Coordination normal.   Skin: Skin is warm and dry. Capillary refill takes less than 2 seconds.   Psychiatric: She has a normal mood and affect. Her behavior is normal.

## 2018-03-14 NOTE — ASSESSMENT & PLAN NOTE
- First diagnosed in 8/2016  - Likely UC flare since C diff testing negative  - CRP slightly elevated to 12.8   - Resume oral mesalamine 1.5 g daily  - Resume rectal mesalamine nightly   - C diff negative  - Flex sig demonstrating active colitis, will start solumedrol per GI recommendation

## 2018-03-14 NOTE — SUBJECTIVE & OBJECTIVE
Interval History: Per hospital course     Review of Systems   Constitutional: Negative for chills and fever.   HENT: Negative for trouble swallowing.    Eyes: Negative for visual disturbance.   Respiratory: Negative for cough and shortness of breath.    Cardiovascular: Negative for chest pain and leg swelling.   Gastrointestinal: Positive for abdominal pain, blood in stool and diarrhea. Negative for abdominal distention, nausea and vomiting.   Genitourinary: Negative for dysuria and hematuria.   Musculoskeletal: Negative for arthralgias and myalgias.   Neurological: Negative for dizziness, weakness, light-headedness and headaches.     Objective:     Vital Signs (Most Recent):  Temp: 98.6 °F (37 °C) (03/14/18 1204)  Pulse: 67 (03/14/18 1232)  Resp: 18 (03/14/18 1232)  BP: 123/85 (03/14/18 1232)  SpO2: 97 % (03/14/18 1232) Vital Signs (24h Range):  Temp:  [97.6 °F (36.4 °C)-98.9 °F (37.2 °C)] 98.6 °F (37 °C)  Pulse:  [66-80] 67  Resp:  [16-20] 18  SpO2:  [97 %-98 %] 97 %  BP: (117-169)/() 123/85        Body mass index is 21.13 kg/m².  No intake or output data in the 24 hours ending 03/14/18 1344   Physical Exam   Constitutional: She is oriented to person, place, and time. She appears well-developed and well-nourished.   HENT:   Head: Normocephalic and atraumatic.   Right Ear: External ear normal.   Left Ear: External ear normal.   Eyes: EOM are normal. Pupils are equal, round, and reactive to light. No scleral icterus.   Neck: Neck supple.   Cardiovascular: Normal rate, regular rhythm, normal heart sounds and intact distal pulses.    Pulmonary/Chest: Effort normal and breath sounds normal.   Abdominal: Soft. Bowel sounds are normal. She exhibits no distension. There is tenderness (mild lower abdominal tenderness). There is no guarding.   Musculoskeletal: She exhibits no edema.   Neurological: She is alert and oriented to person, place, and time. No cranial nerve deficit or sensory deficit. Coordination normal.    Skin: Skin is warm and dry. Capillary refill takes less than 2 seconds.   Psychiatric: She has a normal mood and affect. Her behavior is normal.       Significant Labs:   Recent Results (from the past 24 hour(s))   CBC auto differential    Collection Time: 03/13/18  2:42 PM   Result Value Ref Range    WBC 7.85 3.90 - 12.70 K/uL    RBC 4.79 4.00 - 5.40 M/uL    Hemoglobin 13.4 12.0 - 16.0 g/dL    Hematocrit 41.2 37.0 - 48.5 %    MCV 86 82 - 98 fL    MCH 28.0 27.0 - 31.0 pg    MCHC 32.5 32.0 - 36.0 g/dL    RDW 12.0 11.5 - 14.5 %    Platelets 263 150 - 350 K/uL    MPV 10.5 9.2 - 12.9 fL    Immature Granulocytes 1.1 (H) 0.0 - 0.5 %    Gran # (ANC) 4.5 1.8 - 7.7 K/uL    Immature Grans (Abs) 0.09 (H) 0.00 - 0.04 K/uL    Lymph # 2.1 1.0 - 4.8 K/uL    Mono # 1.0 0.3 - 1.0 K/uL    Eos # 0.2 0.0 - 0.5 K/uL    Baso # 0.05 0.00 - 0.20 K/uL    nRBC 0 0 /100 WBC    Gran% 57.0 38.0 - 73.0 %    Lymph% 27.0 18.0 - 48.0 %    Mono% 12.4 4.0 - 15.0 %    Eosinophil% 1.9 0.0 - 8.0 %    Basophil% 0.6 0.0 - 1.9 %    Differential Method Automated    Comprehensive metabolic panel    Collection Time: 03/13/18  2:42 PM   Result Value Ref Range    Sodium 141 136 - 145 mmol/L    Potassium 3.7 3.5 - 5.1 mmol/L    Chloride 105 95 - 110 mmol/L    CO2 29 23 - 29 mmol/L    Glucose 89 70 - 110 mg/dL    BUN, Bld 10 6 - 20 mg/dL    Creatinine 0.9 0.5 - 1.4 mg/dL    Calcium 9.4 8.7 - 10.5 mg/dL    Total Protein 7.2 6.0 - 8.4 g/dL    Albumin 3.6 3.5 - 5.2 g/dL    Total Bilirubin 0.4 0.1 - 1.0 mg/dL    Alkaline Phosphatase 84 55 - 135 U/L    AST 14 10 - 40 U/L    ALT 8 (L) 10 - 44 U/L    Anion Gap 7 (L) 8 - 16 mmol/L    eGFR if African American >60.0 >60 mL/min/1.73 m^2    eGFR if non African American >60.0 >60 mL/min/1.73 m^2   C-reactive protein    Collection Time: 03/13/18  2:42 PM   Result Value Ref Range    CRP 12.8 (H) 0.0 - 8.2 mg/L   Clostridium difficile EIA    Collection Time: 03/13/18  4:41 PM   Result Value Ref Range    C. diff Antigen  Negative Negative    C difficile Toxins A+B, EIA Negative Negative   E. coli 0157 antigen    Collection Time: 03/13/18  4:41 PM   Result Value Ref Range    Shiga Toxin 1 E.coli Negative     Shiga Toxin 2 E.coli Negative    Comprehensive Metabolic Panel (CMP)    Collection Time: 03/14/18  7:18 AM   Result Value Ref Range    Sodium 140 136 - 145 mmol/L    Potassium 3.7 3.5 - 5.1 mmol/L    Chloride 106 95 - 110 mmol/L    CO2 27 23 - 29 mmol/L    Glucose 95 70 - 110 mg/dL    BUN, Bld 8 6 - 20 mg/dL    Creatinine 0.9 0.5 - 1.4 mg/dL    Calcium 8.9 8.7 - 10.5 mg/dL    Total Protein 7.0 6.0 - 8.4 g/dL    Albumin 3.5 3.5 - 5.2 g/dL    Total Bilirubin 0.5 0.1 - 1.0 mg/dL    Alkaline Phosphatase 86 55 - 135 U/L    AST 11 10 - 40 U/L    ALT 8 (L) 10 - 44 U/L    Anion Gap 7 (L) 8 - 16 mmol/L    eGFR if African American >60.0 >60 mL/min/1.73 m^2    eGFR if non African American >60.0 >60 mL/min/1.73 m^2   Magnesium    Collection Time: 03/14/18  7:18 AM   Result Value Ref Range    Magnesium 1.9 1.6 - 2.6 mg/dL   Phosphorus    Collection Time: 03/14/18  7:18 AM   Result Value Ref Range    Phosphorus 4.0 2.7 - 4.5 mg/dL   CBC with Automated Differential    Collection Time: 03/14/18  7:18 AM   Result Value Ref Range    WBC 6.95 3.90 - 12.70 K/uL    RBC 5.03 4.00 - 5.40 M/uL    Hemoglobin 13.8 12.0 - 16.0 g/dL    Hematocrit 42.0 37.0 - 48.5 %    MCV 84 82 - 98 fL    MCH 27.4 27.0 - 31.0 pg    MCHC 32.9 32.0 - 36.0 g/dL    RDW 12.1 11.5 - 14.5 %    Platelets 241 150 - 350 K/uL    MPV 10.4 9.2 - 12.9 fL    Immature Granulocytes 0.7 (H) 0.0 - 0.5 %    Gran # (ANC) 4.5 1.8 - 7.7 K/uL    Immature Grans (Abs) 0.05 (H) 0.00 - 0.04 K/uL    Lymph # 1.5 1.0 - 4.8 K/uL    Mono # 0.8 0.3 - 1.0 K/uL    Eos # 0.1 0.0 - 0.5 K/uL    Baso # 0.04 0.00 - 0.20 K/uL    nRBC 0 0 /100 WBC    Gran% 64.3 38.0 - 73.0 %    Lymph% 21.6 18.0 - 48.0 %    Mono% 10.8 4.0 - 15.0 %    Eosinophil% 2.0 0.0 - 8.0 %    Basophil% 0.6 0.0 - 1.9 %    Differential Method  Automated          Significant Imaging: I have reviewed all pertinent imaging results/findings within the past 24 hours.

## 2018-03-14 NOTE — TRANSFER OF CARE
"Anesthesia Transfer of Care Note    Patient: Ansley Bautista    Procedure(s) Performed: Procedure(s) (LRB):  SIGMOIDOSCOPY-FLEXIBLE (N/A)    Patient location: PACU    Anesthesia Type: general    Transport from OR: Transported from OR on room air with adequate spontaneous ventilation    Post pain: adequate analgesia    Post assessment: no apparent anesthetic complications    Post vital signs: stable    Level of consciousness: awake and alert    Nausea/Vomiting: no nausea/vomiting    Complications: none    Transfer of care protocol was followed      Last vitals:   Visit Vitals  BP (!) 165/105 (BP Location: Right arm, Patient Position: Lying)   Pulse 69   Temp 37.1 °C (98.8 °F) (Temporal)   Resp 16   Ht 5' 2" (1.575 m)   SpO2 98%   Breastfeeding? No   BMI 21.13 kg/m²     "

## 2018-03-14 NOTE — HPI
This is a 53 yo F with ulcerative pancolitis (symptoms 7/2016, diagnosis 8/2016), anxiety, small serrated colon polyp removed (colonoscopy 8/2016) who was admitted from GI clinic yesterday for concerns of UC flare vs c. Diff colitis. Patient has been reporting increasing bowel movements since February. On 2/26/18, she had a positive c. Diff and was started on oral vancomycin 125 mg QID. On 3/9/2018, patient called with no improvement, vancomycin increased to 500 mg PO TID. On 3/11/2018: patient called with worsening diarrhea up to 10-12 watery Bms/day. It was suggested she come to ER for admission, however she chose to wait to see if symptoms improved. On 3/13/2018, patient emailed with worsening symptoms and requesting admission, so she was brought in for an urgent visit to Dr. Walsh's clinic. Patient reports that in the past 24hours she has had about 15 bowel movements. She reports each one is different, about 1/3 are bloody. She reports associated abdominal cramping. She reports weakness. Denies any fevers or chills.    See Dr. Walsh's most recent clinic note 3/13/18 for full IBD history.

## 2018-03-14 NOTE — SUBJECTIVE & OBJECTIVE
Past Medical History:   Diagnosis Date    Colon polyp        Past Surgical History:   Procedure Laterality Date    bilateral knee scopes      breast augumentation      COLONOSCOPY      cyst removal off of breast      PARTIAL HYSTERECTOMY      surgery on both feet         Review of patient's allergies indicates:   Allergen Reactions    Pcn [penicillins] Anaphylaxis    Zithromax [azithromycin] Rash     Family History     Problem Relation (Age of Onset)    Diabetes Father    Diverticulitis Mother    Heart disease Father    Hypertension Father, Sister, Brother        Social History Main Topics    Smoking status: Former Smoker    Smokeless tobacco: Never Used      Comment: quit around 2003; smoked when drank, not daily    Alcohol use 0.6 - 1.2 oz/week     1 - 2 Glasses of wine per week      Comment: 1-2 in a month    Drug use: No    Sexual activity: Yes     Review of Systems   Constitutional: Positive for activity change and appetite change. Negative for chills and fever.   HENT: Negative for sore throat and trouble swallowing.    Respiratory: Negative for cough and shortness of breath.    Cardiovascular: Negative for chest pain and leg swelling.   Gastrointestinal: Positive for blood in stool, diarrhea and nausea. Negative for abdominal distention and constipation.        +abdominal cramping   Genitourinary: Negative for decreased urine volume and difficulty urinating.   Musculoskeletal: Negative for arthralgias and back pain.   Skin: Negative for color change and pallor.   Neurological: Positive for weakness. Negative for dizziness.   Psychiatric/Behavioral: Negative for agitation and confusion. The patient is nervous/anxious.      Objective:     Vital Signs (Most Recent):  Temp: 96.2 °F (35.7 °C) (03/14/18 1555)  Pulse: 74 (03/14/18 1555)  Resp: 17 (03/14/18 1555)  BP: 117/68 (03/14/18 1555)  SpO2: 96 % (03/14/18 1555) Vital Signs (24h Range):  Temp:  [96.2 °F (35.7 °C)-98.9 °F (37.2 °C)] 96.2 °F (35.7  °C)  Pulse:  [66-80] 74  Resp:  [16-20] 17  SpO2:  [96 %-98 %] 96 %  BP: (117-165)/() 117/68        Body mass index is 21.13 kg/m².    No intake or output data in the 24 hours ending 03/14/18 1747    Lines/Drains/Airways     Peripheral Intravenous Line                 Peripheral IV - Single Lumen 03/14/18 0430 Left Forearm less than 1 day                Physical Exam   Constitutional: She is oriented to person, place, and time. No distress.   Appears uncomfortable   HENT:   Mouth/Throat: Oropharynx is clear and moist.   Eyes: No scleral icterus.   Cardiovascular: Normal rate and regular rhythm.    Pulmonary/Chest: Effort normal and breath sounds normal.   Abdominal: Soft. Bowel sounds are normal. She exhibits no distension and no mass. There is no tenderness. There is no guarding.   Musculoskeletal: She exhibits no edema or deformity.   Lymphadenopathy:     She has no cervical adenopathy.   Neurological: She is alert and oriented to person, place, and time.   Skin: Skin is warm and dry.   Psychiatric: She has a normal mood and affect.   Vitals reviewed.      Significant Labs:  CBC:   Recent Labs  Lab 03/13/18  1442 03/14/18  0718   WBC 7.85 6.95   HGB 13.4 13.8   HCT 41.2 42.0    241     CMP:   Recent Labs  Lab 03/14/18  0718   GLU 95   CALCIUM 8.9   ALBUMIN 3.5   PROT 7.0      K 3.7   CO2 27      BUN 8   CREATININE 0.9   ALKPHOS 86   ALT 8*   AST 11   BILITOT 0.5     Coagulation: No results for input(s): PT, INR, APTT in the last 48 hours.

## 2018-03-14 NOTE — ANESTHESIA POSTPROCEDURE EVALUATION
"Anesthesia Post Evaluation    Patient: Ansley Bautista    Procedure(s) Performed: Procedure(s) (LRB):  SIGMOIDOSCOPY-FLEXIBLE (N/A)    Final Anesthesia Type: general  Patient location during evaluation: GI PACU  Patient participation: Yes- Able to Participate  Level of consciousness: awake and alert  Post-procedure vital signs: reviewed and stable  Pain management: adequate  Airway patency: patent  PONV status at discharge: No PONV  Anesthetic complications: no      Cardiovascular status: blood pressure returned to baseline  Respiratory status: unassisted  Hydration status: euvolemic  Follow-up not needed.        Visit Vitals  /85   Pulse 67   Temp 37 °C (98.6 °F) (Temporal)   Resp 18   Ht 5' 2" (1.575 m)   SpO2 97%   Breastfeeding? No   BMI 21.13 kg/m²       Pain/Starr Score: Pain Assessment Performed: Yes (3/14/2018 12:33 PM)  Presence of Pain: denies (3/14/2018 12:33 PM)  Starr Score: 9 (3/14/2018 12:20 PM)      "

## 2018-03-14 NOTE — ASSESSMENT & PLAN NOTE
51 yo female with ulcerative pancolitis. Patient seen yesterday for an urgent clinic visit with plans for direct admission.  She had a positive C. Diff result on 2/16/2018 and was started on oral vancomycin 125 mg PO QID.  Her diarrhea worsened by 3/9/2018 and the oral vancomycin was increased to 500 mg TID as off label treatment for the UC and also giving the vanco more time to work for c diff. We were concerned about steroids in case this was UC flare in setting of C diff and due to stable symptoms was going to give vanco more time to work . We were also concerned about repeating stool C diff to early due to risk of false positive results. She has been on that dose along with align 1 tablet BID, apriso 1.5 gm/day, and canasa 1000 mg PA QHS for 3 full days.  On 3/11/2018 she called with worsening diarrhea up to 12 small volume stools per day with 4 containing blood and 3 nocturnal BMs so we discussed admission. Flex sig today showed signs of active colitis, biopsies for CMV were taken. C. Diff resulted as negative. Will treat as UC flare.    Recommendations:  - Solumedrol 40mg IV x1 bolus today (day 1)  - Start Solumedrol 40mg/24h gtt starting tomorrow   - Will check CRP on day 3  - Dose reduce Vancomycin to 125mg PO QID  - Continue Apriso 1.5g daily  - Continue Mesalamine suppository  - Start chemical DVT ppx as IBD patients are at increased risk for VTE  - Avoid NSAIDs and opiates

## 2018-03-14 NOTE — PLAN OF CARE
Extended Emergency Contact Information  Primary Emergency Contact: Antonio Bautista  Address: [address] [place] [address] [facility]  Home Phone: [phone]  Work Phone: [phone]  Mobile Phone: [phone]  Relation: Spouse  Preferred language: English    Primary Doctor No    Future Appointments  Date Time Provider Department Center   5/14/2018 1:00 PM Magdalena Morales, [facility]     Payor: AETNA / Plan: AETNA OPEN CHOICE / Product Type: PPO /       [id number] [place] [facility] [address]  [id number]  [facility] [address]  Phone: [phone] Fax: [phone]    [facility] [place], [place] - [address] [address]  Phone: [phone] Fax: [phone]       03/14/18 1722   Discharge Assessment   Assessment Type Discharge Planning Assessment   Confirmed/corrected address and phone number on facesheet? Yes   Assessment information obtained from? Patient;Medical Record   Expected Length of Stay (days) 3   Communicated expected length of stay with patient/caregiver yes   Prior to hospitilization cognitive status: Alert/Oriented   Prior to hospitalization functional status: Independent   Current cognitive status: Alert/Oriented   Current Functional Status: Independent   Lives With spouse   Able to Return to Prior Arrangements yes   Is patient able to care for self after discharge? Yes   Patient's perception of discharge disposition home or selfcare   Readmission Within The Last 30 Days no previous admission in last 30 days   Patient currently being followed by outpatient case management? No   Patient currently receives any other outside agency services? No   Equipment Currently Used at Home none   Do you have any problems affording any of your prescribed medications? No   Is the patient taking medications as prescribed? yes   Does the patient have transportation home? Yes   Transportation Available family  or friend will provide   Does the patient receive services at the [facility]? No   Discharge Plan A Home   Discharge Plan B Home   Patient/Family In Agreement With Plan yes   Readmission Questionnaire   Have you felt down, depressed, or hopeless? 1

## 2018-03-14 NOTE — ASSESSMENT & PLAN NOTE
- First diagnosed in 8/2016  - Unclear if current diarrhea is due to UC flare or persistent C Diff  - CRP slightly elevated to 12.8   - Resume oral mesalamine 1.5 g daily  - Resume rectal mesalamine nightly   - Hold off on steroids for now  - GI consulted. Will keep NPO at midnight for possible flex sig

## 2018-03-14 NOTE — ASSESSMENT & PLAN NOTE
- first tested positive for C Diff in 11/2017, treated with steroid and oral vanc  - 2/16/2018: positive C. Diff, started oral vancomycin 125 mg QID, increased to 500 mg PO TID on 3/9 due to persistent   - Currently afebrile with no leykocytosis  - Continue oral vanc 500 mg TID given previous recurrence  - Repeat C Diff studies negative  - Other stool studies pending   - Discontinue contact precautions
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
## 2018-03-14 NOTE — PROGRESS NOTES
Dr. SALVADOR garcia at bedside speaking to pt and daughter regarding diagnosis and treatment . To stay inpatient with iv,

## 2018-03-14 NOTE — PROVATION PATIENT INSTRUCTIONS
Discharge Summary/Instructions after an Endoscopic Procedure  Patient Name: Ansley Bautista  Patient MRN: [id number]  Patient YOB: 1965 Wednesday, March 14, 2018  Mike Walsh MD  RESTRICTIONS:  During your procedure today, you received medications for sedation.  These   medications may affect your judgment, balance and coordination.  Therefore,   for 24 hours, you have the following restrictions:   - DO NOT drive a car, operate machinery, make legal/financial decisions,   sign important papers or drink alcohol.    ACTIVITY:  The following day: return to full activity including work, except no heavy   lifting, straining or running for 3 days if polyps were removed.  DIET:  Eat and drink normally unless instructed otherwise.     TREATMENT FOR COMMON SIDE EFFECTS:  - Mild abdominal pain, nausea, belching, bloating or excessive gas:  rest,   eat lightly and use a heating pad.  - Sore Throat: treat with throat lozenges and/or gargle with warm salt   water.  - Because air was used during the procedure, expelling large amounts of air   from your rectum or belching is normal.  - If a bowel prep was taken, you may not have a bowel movement for 1-3 days.    This is normal.  SYMPTOMS TO WATCH FOR AND REPORT TO YOUR PHYSICIAN:  1. Abdominal pain or bloating, other than gas cramps.  2. Chest pain.  3. Back pain.  4. Signs of infection such as: chills or fever occurring within 24 hours   after the procedure.  5. Rectal bleeding, which would show as bright red, maroon, or black stools.   (A tablespoon of blood from the rectum is not serious, especially if   hemorrhoids are present.)  6. Vomiting.  7. Weakness or dizziness.  GO DIRECTLY TO THE NEAREST EMERGENCY ROOM IF YOU HAVE ANY OF THE FOLLOWING:      Difficulty breathing  Chills and/or fever over 101 F   Persistent vomiting and/or vomiting blood   Severe abdominal pain   Severe chest pain   Black, tarry stools   Bleeding- more than one tablespoon   Any other  symptom or condition that you feel may need urgent attention  Your doctor recommends these additional instructions:  If any biopsies were taken, your doctors clinic will contact you in 1 to 2   weeks with any results.  You are being discharged to home.   You have a contact number available for emergencies.  The signs and symptoms   of potential delayed complications were discussed with you.  You may return   to normal activities tomorrow.  Written discharge instructions were   provided to you.   Resume your previous diet.   We are waiting for your pathology results.   Return to your GI clinic as previously scheduled.  For questions, problems or results please call your physician - Mike Walsh MD at Work:  [phone].  [facility], EMERGENCY ROOM PHONE NUMBER: [phone]  IF A COMPLICATION OR EMERGENCY SITUATION ARISES AND YOU ARE UNABLE TO REACH   YOUR PHYSICIAN - GO DIRECTLY TO THE EMERGENCY ROOM.  Mike Walsh MD  3/14/2018 12:01:32 PM  This report has been verified and signed electronically.
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
## 2018-03-14 NOTE — ANESTHESIA PREPROCEDURE EVALUATION
03/14/2018  Ansley Bautista is a 52 y.o., female.    Pre-op Assessment    I have reviewed the Patient Summary Reports.      I have reviewed the Medications.     Review of Systems  Anesthesia Hx:  History of prior surgery of interest to airway management or planning:  Denies Personal Hx of Anesthesia complications.   Social:  Former Smoker    Cardiovascular:   Hypertension    Hepatic/GI:   PUD, Ulcerative rectosigmoid   Psych:   anxiety          Physical Exam  General:  Well nourished    Airway/Jaw/Neck:  Airway Findings: Mouth Opening: Normal Tongue: Normal  General Airway Assessment: Adult  Mallampati: III  Improves to II with phonation.  TM Distance: Normal, at least 6 cm  Jaw/Neck Findings:  Neck ROM: Normal ROM       Chest/Lungs:  Chest/Lungs Findings: Normal Respiratory Rate     Heart/Vascular:  Heart Findings: Rate: Normal        Mental Status:  Mental Status Findings:  Alert and Oriented         Anesthesia Plan  Type of Anesthesia, risks & benefits discussed:  Anesthesia Type:  general  Patient's Preference: General   Intra-op Monitoring Plan: standard ASA monitors  Intra-op Monitoring Plan Comments:   Post Op Pain Control Plan:   Post Op Pain Control Plan Comments:   Induction:   IV  Beta Blocker:  Patient is not currently on a Beta-Blocker (No further documentation required).       Informed Consent: Patient understands risks and agrees with Anesthesia plan.  Questions answered. Anesthesia consent signed with patient.  ASA Score: 2     Day of Surgery Review of History & Physical:    H&P update referred to the provider.     Anesthesia Plan Notes: NPO confirmed.           Ready For Surgery From Anesthesia Perspective.
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
## 2018-03-14 NOTE — INTERVAL H&P NOTE
The patient has been examined and the H&P has been reviewed:    I concur with the findings and no changes have occurred since H&P was written.    Anesthesia/Surgery risks, benefits and alternative options discussed and understood by patient/family.          Active Hospital Problems    Diagnosis  POA    *Ulcerative pancolitis [K51.00]  Yes    Recurrent colitis due to Clostridium difficile [A04.71]  Yes    Essential hypertension [I10]  Yes    Anxiety [F41.9]  Yes      Resolved Hospital Problems    Diagnosis Date Resolved POA   No resolved problems to display.

## 2018-03-14 NOTE — CONSULTS
[facility]  Gastroenterology  Consult Note    Patient Name: Ansley Bautista  MRN: [id number]  Admission Date: 3/13/2018  Hospital Length of Stay: 1 days  Code Status: Full Code   Attending Provider: Soni Capellan MD   Consulting Provider: Armaan Aleman MD  Primary Care Physician: Primary Doctor No  Principal Problem:Ulcerative pancolitis    Inpatient consult to Gastroenterology  Consult performed by: ARMAAN ALEMAN  Consult ordered by: ARTURO DIEZ        Subjective:     HPI:  This is a 51 yo F with ulcerative pancolitis (symptoms 7/2016, diagnosis 8/2016), anxiety, small serrated colon polyp removed (colonoscopy 8/2016) who was admitted from GI clinic yesterday for concerns of UC flare vs c. Diff colitis. Patient has been reporting increasing bowel movements since February. On 2/26/18, she had a positive c. Diff and was started on oral vancomycin 125 mg QID. On 3/9/2018, patient called with no improvement, vancomycin increased to 500 mg PO TID. On 3/11/2018: patient called with worsening diarrhea up to 10-12 watery Bms/day. It was suggested she come to ER for admission, however she chose to wait to see if symptoms improved. On 3/13/2018, patient emailed with worsening symptoms and requesting admission, so she was brought in for an urgent visit to Dr. Walsh's clinic. Patient reports that in the past 24hours she has had about 15 bowel movements. She reports each one is different, about 1/3 are bloody. She reports associated abdominal cramping. She reports weakness. Denies any fevers or chills.    See Dr. Walsh's most recent clinic note 3/13/18 for full IBD history.      Past Medical History:   Diagnosis Date    Colon polyp        Past Surgical History:   Procedure Laterality Date    bilateral knee scopes      breast augumentation      COLONOSCOPY      cyst removal off of breast      PARTIAL HYSTERECTOMY      surgery on both feet         Review of patient's allergies indicates:    Allergen Reactions    Pcn [penicillins] Anaphylaxis    Zithromax [azithromycin] Rash     Family History     Problem Relation (Age of Onset)    Diabetes Father    Diverticulitis Mother    Heart disease Father    Hypertension Father, Sister, Brother        Social History Main Topics    Smoking status: Former Smoker    Smokeless tobacco: Never Used      Comment: quit around 2003; smoked when drank, not daily    Alcohol use 0.6 - 1.2 oz/week     1 - 2 Glasses of wine per week      Comment: 1-2 in a month    Drug use: No    Sexual activity: Yes     Review of Systems   Constitutional: Positive for activity change and appetite change. Negative for chills and fever.   HENT: Negative for sore throat and trouble swallowing.    Respiratory: Negative for cough and shortness of breath.    Cardiovascular: Negative for chest pain and leg swelling.   Gastrointestinal: Positive for blood in stool, diarrhea and nausea. Negative for abdominal distention and constipation.        +abdominal cramping   Genitourinary: Negative for decreased urine volume and difficulty urinating.   Musculoskeletal: Negative for arthralgias and back pain.   Skin: Negative for color change and pallor.   Neurological: Positive for weakness. Negative for dizziness.   Psychiatric/Behavioral: Negative for agitation and confusion. The patient is nervous/anxious.      Objective:     Vital Signs (Most Recent):  Temp: 96.2 °F (35.7 °C) (03/14/18 1555)  Pulse: 74 (03/14/18 1555)  Resp: 17 (03/14/18 1555)  BP: 117/68 (03/14/18 1555)  SpO2: 96 % (03/14/18 1555) Vital Signs (24h Range):  Temp:  [96.2 °F (35.7 °C)-98.9 °F (37.2 °C)] 96.2 °F (35.7 °C)  Pulse:  [66-80] 74  Resp:  [16-20] 17  SpO2:  [96 %-98 %] 96 %  BP: (117-165)/() 117/68        Body mass index is 21.13 kg/m².    No intake or output data in the 24 hours ending 03/14/18 3227    Lines/Drains/Airways     Peripheral Intravenous Line                 Peripheral IV - Single Lumen 03/14/18 3548  Left Forearm less than 1 day                Physical Exam   Constitutional: She is oriented to person, place, and time. No distress.   Appears uncomfortable   HENT:   Mouth/Throat: Oropharynx is clear and moist.   Eyes: No scleral icterus.   Cardiovascular: Normal rate and regular rhythm.    Pulmonary/Chest: Effort normal and breath sounds normal.   Abdominal: Soft. Bowel sounds are normal. She exhibits no distension and no mass. There is no tenderness. There is no guarding.   Musculoskeletal: She exhibits no edema or deformity.   Lymphadenopathy:     She has no cervical adenopathy.   Neurological: She is alert and oriented to person, place, and time.   Skin: Skin is warm and dry.   Psychiatric: She has a normal mood and affect.   Vitals reviewed.      Significant Labs:  CBC:   Recent Labs  Lab 03/13/18  1442 03/14/18  0718   WBC 7.85 6.95   HGB 13.4 13.8   HCT 41.2 42.0    241     CMP:   Recent Labs  Lab 03/14/18  0718   GLU 95   CALCIUM 8.9   ALBUMIN 3.5   PROT 7.0      K 3.7   CO2 27      BUN 8   CREATININE 0.9   ALKPHOS 86   ALT 8*   AST 11   BILITOT 0.5     Coagulation: No results for input(s): PT, INR, APTT in the last 48 hours.        Assessment/Plan:     * Ulcerative pancolitis    53 yo female with ulcerative pancolitis. Patient seen yesterday for an urgent clinic visit with plans for direct admission.  She had a positive C. Diff result on 2/16/2018 and was started on oral vancomycin 125 mg PO QID.  Her diarrhea worsened by 3/9/2018 and the oral vancomycin was increased to 500 mg TID as off label treatment for the UC and also giving the vanco more time to work for c diff. We were concerned about steroids in case this was UC flare in setting of C diff and due to stable symptoms was going to give vanco more time to work . We were also concerned about repeating stool C diff to early due to risk of false positive results. She has been on that dose along with align 1 tablet BID, apriso 1.5  gm/day, and canasa 1000 mg [place] QHS for 3 full days.  On 3/11/2018 she called with worsening diarrhea up to 12 small volume stools per day with 4 containing blood and 3 nocturnal BMs so we discussed admission. Flex sig today showed signs of active colitis, biopsies for CMV were taken. C. Diff resulted as negative. Will treat as UC flare.    Recommendations:  - Solumedrol 40mg IV x1 bolus today (day 1)  - Start Solumedrol 40mg/24h gtt starting tomorrow   - Will check CRP on day 3  - Dose reduce Vancomycin to 125mg PO QID  - Continue Apriso 1.5g daily  - Continue Mesalamine suppository  - Start chemical DVT ppx as IBD patients are at increased risk for VTE  - Avoid NSAIDs and opiates            Thank you for your consult. I will follow-up with patient. Please contact us if you have any additional questions.    Armaan Navarrete MD PGY-IV  Gastroenterology Fellow  [facility]  [phone]

## 2018-03-15 PROBLEM — K51.011 ULCERATIVE PANCOLITIS WITH RECTAL BLEEDING: Status: ACTIVE | Noted: 2017-11-28

## 2018-03-15 LAB
ALBUMIN SERPL BCP-MCNC: 3.3 G/DL
ALP SERPL-CCNC: 86 U/L
ALT SERPL W/O P-5'-P-CCNC: 9 U/L
ANION GAP SERPL CALC-SCNC: 9 MMOL/L
AST SERPL-CCNC: 10 U/L
BASOPHILS # BLD AUTO: 0.03 K/UL
BASOPHILS NFR BLD: 0.3 %
BILIRUB SERPL-MCNC: 0.2 MG/DL
BUN SERPL-MCNC: 14 MG/DL
CALCIUM SERPL-MCNC: 9.3 MG/DL
CHLORIDE SERPL-SCNC: 108 MMOL/L
CO2 SERPL-SCNC: 24 MMOL/L
CREAT SERPL-MCNC: 0.8 MG/DL
DIFFERENTIAL METHOD: ABNORMAL
EOSINOPHIL # BLD AUTO: 0 K/UL
EOSINOPHIL NFR BLD: 0.1 %
ERYTHROCYTE [DISTWIDTH] IN BLOOD BY AUTOMATED COUNT: 11.9 %
EST. GFR  (AFRICAN AMERICAN): >60 ML/MIN/1.73 M^2
EST. GFR  (NON AFRICAN AMERICAN): >60 ML/MIN/1.73 M^2
GLUCOSE SERPL-MCNC: 111 MG/DL
HCT VFR BLD AUTO: 42.1 %
HGB BLD-MCNC: 13.9 G/DL
IMM GRANULOCYTES # BLD AUTO: 0.08 K/UL
IMM GRANULOCYTES NFR BLD AUTO: 0.7 %
LYMPHOCYTES # BLD AUTO: 1.6 K/UL
LYMPHOCYTES NFR BLD: 13.4 %
MAGNESIUM SERPL-MCNC: 1.9 MG/DL
MCH RBC QN AUTO: 28.3 PG
MCHC RBC AUTO-ENTMCNC: 33 G/DL
MCV RBC AUTO: 86 FL
MONOCYTES # BLD AUTO: 0.9 K/UL
MONOCYTES NFR BLD: 7.6 %
NEUTROPHILS # BLD AUTO: 9.2 K/UL
NEUTROPHILS NFR BLD: 77.9 %
NRBC BLD-RTO: 0 /100 WBC
PLATELET # BLD AUTO: 279 K/UL
PMV BLD AUTO: 10.5 FL
POTASSIUM SERPL-SCNC: 4.4 MMOL/L
PROT SERPL-MCNC: 6.7 G/DL
RBC # BLD AUTO: 4.91 M/UL
SODIUM SERPL-SCNC: 141 MMOL/L
WBC # BLD AUTO: 11.74 K/UL

## 2018-03-15 PROCEDURE — 25000003 PHARM REV CODE 250: Performed by: STUDENT IN AN ORGANIZED HEALTH CARE EDUCATION/TRAINING PROGRAM

## 2018-03-15 PROCEDURE — 99233 SBSQ HOSP IP/OBS HIGH 50: CPT | Mod: ,,, | Performed by: INTERNAL MEDICINE

## 2018-03-15 PROCEDURE — 83735 ASSAY OF MAGNESIUM: CPT

## 2018-03-15 PROCEDURE — 85025 COMPLETE CBC W/AUTO DIFF WBC: CPT

## 2018-03-15 PROCEDURE — 63600175 PHARM REV CODE 636 W HCPCS: Performed by: STUDENT IN AN ORGANIZED HEALTH CARE EDUCATION/TRAINING PROGRAM

## 2018-03-15 PROCEDURE — 80053 COMPREHEN METABOLIC PANEL: CPT

## 2018-03-15 PROCEDURE — 25000003 PHARM REV CODE 250: Performed by: SURGERY

## 2018-03-15 PROCEDURE — 36415 COLL VENOUS BLD VENIPUNCTURE: CPT

## 2018-03-15 PROCEDURE — 11000001 HC ACUTE MED/SURG PRIVATE ROOM

## 2018-03-15 PROCEDURE — 25000003 PHARM REV CODE 250: Performed by: INTERNAL MEDICINE

## 2018-03-15 PROCEDURE — 99232 SBSQ HOSP IP/OBS MODERATE 35: CPT | Mod: ,,, | Performed by: HOSPITALIST

## 2018-03-15 RX ADMIN — ACETAMINOPHEN 650 MG: 325 TABLET ORAL at 05:03

## 2018-03-15 RX ADMIN — Medication 125 MG: at 11:03

## 2018-03-15 RX ADMIN — MESALAMINE 1.5 G: 0.38 CAPSULE, EXTENDED RELEASE ORAL at 08:03

## 2018-03-15 RX ADMIN — Medication 125 MG: at 01:03

## 2018-03-15 RX ADMIN — Medication 125 MG: at 06:03

## 2018-03-15 RX ADMIN — MESALAMINE 1000 MG: 1000 SUPPOSITORY RECTAL at 09:03

## 2018-03-15 RX ADMIN — DEXTROSE: 50 INJECTION, SOLUTION INTRAVENOUS at 10:03

## 2018-03-15 RX ADMIN — AMLODIPINE BESYLATE 5 MG: 5 TABLET ORAL at 08:03

## 2018-03-15 RX ADMIN — VITAMIN D, TAB 1000IU (100/BT) 1000 UNITS: 25 TAB at 08:03

## 2018-03-15 RX ADMIN — Medication 125 MG: at 05:03

## 2018-03-15 NOTE — PHARMACY MED REC
"Admission Medication Reconciliation - Pharmacy Consult Note    The home medication history was taken by Scarlet Benavides, Pharmacy Technician.  Based on information gathered and subsequent review by the clinical pharmacist, the items below may need attention.    You may go to "Admission" then "Reconcile Home Medications" tabs to review and/or act upon these items.    No issues noted with the medication reconciliation.    Potential issues to be addressed PRIOR TO DISCHARGE  o Patient would prefer to have discharge prescriptions delivered to bedside.     Please address this information as you see fit.  Feel free to contact us if you have any questions or require assistance.    Estephania Reynoso, PharmD  [id number]     Patient's prior to admission medication regimen was as follows:  Medication Sig    amLODIPine (NORVASC) 5 MG tablet Take 1 tablet (5 mg total) by mouth once daily.    mesalamine (APRISO) 0.375 gram Cp24 Take 4 capsules (1.5 g total) by mouth once daily.    mesalamine (CANASA) 1000 MG Supp Place 1 suppository (1,000 mg total) rectally nightly.    ondansetron (ZOFRAN-ODT) 4 MG TbDL Take 1 tablet (4 mg total) by mouth every 8 (eight) hours as needed (nausea).    vancomycin oral solution 25mg/mL 500 mg (20mL) by mouth three times daily         .    .        "

## 2018-03-15 NOTE — ASSESSMENT & PLAN NOTE
53 yo female with ulcerative pancolitis. Patient seen yesterday for an urgent clinic visit with plans for direct admission.  She had a positive C. Diff result on 2/16/2018 and was started on oral vancomycin 125 mg PO QID.  Her diarrhea worsened by 3/9/2018 and the oral vancomycin was increased to 500 mg TID as off label treatment for the UC and also giving the vanco more time to work for c diff. We were concerned about steroids in case this was UC flare in setting of C diff and due to stable symptoms was going to give vanco more time to work . We were also concerned about repeating stool C diff to early due to risk of false positive results. She has been on that dose along with align 1 tablet BID, apriso 1.5 gm/day, and canasa 1000 mg [place] QHS for 3 full days.  On 3/11/2018 she called with worsening diarrhea up to 12 small volume stools per day with 4 containing blood and 3 nocturnal BMs so we discussed admission. Flex sig today showed signs of active colitis, biopsies for CMV were taken. C. Diff resulted as negative. Will treat as UC flare. Today, we discussed that we will likely start Imuran on discharge, pending her clinical course. We explained to her the risks of Imuran and patient understood.    Recommendations:  - Solumedrol 40mg/24h gtt started today (day 2 of steroids)  - Will check CRP on day 3 (saturday)  - Continue Vancomycin to 125mg PO QID  - Continue Apriso 1.5g daily  - Start chemical DVT ppx as IBD patients are at increased risk for VTE  - Avoid NSAIDs and opiates

## 2018-03-15 NOTE — ASSESSMENT & PLAN NOTE
- Improving  - First diagnosed in 8/2016  - Likely UC flare since C diff testing negative  - CRP slightly elevated to 12.8, planning on trending at day 3 (ordered)  - Resume oral + rectal mesalamine  - C diff negative  - Flex sig demonstrating active colitis, continue solumedrol per GI recommendation

## 2018-03-15 NOTE — ASSESSMENT & PLAN NOTE
# C diff colitis- resolved but recurrent  - decrease oral vancomycin to 125 mg po QID  - after well on prednisone then we will do a special taper to help prevent recurrence

## 2018-03-15 NOTE — SUBJECTIVE & OBJECTIVE
Subjective:     Interval History: Patient reports 4 bowel movements from 8am to 10am this morning with scant blood. Continues to have abdominal cramping. No fevers or chills overnight.     Review of Systems   Constitutional: Negative for chills and fever.   HENT: Negative for sore throat and trouble swallowing.    Respiratory: Negative for cough and shortness of breath.    Cardiovascular: Negative for chest pain and leg swelling.   Gastrointestinal: Positive for blood in stool and diarrhea. Negative for abdominal distention, nausea and vomiting.   Genitourinary: Negative for decreased urine volume and difficulty urinating.   Musculoskeletal: Negative for arthralgias and back pain.   Skin: Negative for color change and pallor.   Neurological: Negative for dizziness and light-headedness.   Psychiatric/Behavioral: Negative for agitation and confusion.     Objective:     Vital Signs (Most Recent):  Temp: 96.2 °F (35.7 °C) (03/15/18 1531)  Pulse: 74 (03/15/18 1531)  Resp: 18 (03/15/18 1531)  BP: 120/75 (03/15/18 1531)  SpO2: 96 % (03/15/18 1531) Vital Signs (24h Range):  Temp:  [96.2 °F (35.7 °C)-98.2 °F (36.8 °C)] 96.2 °F (35.7 °C)  Pulse:  [61-76] 74  Resp:  [16-18] 18  SpO2:  [92 %-99 %] 96 %  BP: (110-142)/(70-81) 120/75        Body mass index is 21.13 kg/m².    No intake or output data in the 24 hours ending 03/15/18 1701    Lines/Drains/Airways     Peripheral Intravenous Line                 Peripheral IV - Single Lumen 03/14/18 0430 Left Antecubital 1 day                Physical Exam   Constitutional: She is oriented to person, place, and time. No distress.   Thin, female   HENT:   Mouth/Throat: Oropharynx is clear and moist.   Eyes: No scleral icterus.   Cardiovascular: Normal rate and regular rhythm.    Pulmonary/Chest: Effort normal and breath sounds normal.   Abdominal: Soft. Bowel sounds are normal. She exhibits no distension and no mass. There is no tenderness. There is no guarding.   Musculoskeletal: She  exhibits no edema or deformity.   Lymphadenopathy:     She has no cervical adenopathy.   Neurological: She is alert and oriented to person, place, and time.   Skin: Skin is warm and dry.   Psychiatric: She has a normal mood and affect.   Vitals reviewed.      Significant Labs:  CBC:   Recent Labs  Lab 03/14/18 0718 03/15/18  0524   WBC 6.95 11.74   HGB 13.8 13.9   HCT 42.0 42.1    279     CMP:   Recent Labs  Lab 03/15/18  0524   *   CALCIUM 9.3   ALBUMIN 3.3*   PROT 6.7      K 4.4   CO2 24      BUN 14   CREATININE 0.8   ALKPHOS 86   ALT 9*   AST 10   BILITOT 0.2     Coagulation: No results for input(s): PT, INR, APTT in the last 48 hours.

## 2018-03-15 NOTE — SUBJECTIVE & OBJECTIVE
"Interval History:     Ms. Bautista did well overnight. Her bowel movements have decreased in frequency (down to 6/day, an improvement from "too many to count.") GI is actively following, recommending solumedrol infusion and follow-up inflammation markers for tomorrow.    Review of Systems   Constitutional: Positive for activity change and appetite change. Negative for chills and fever.   HENT: Negative for sore throat and trouble swallowing.    Respiratory: Negative for cough and shortness of breath.    Cardiovascular: Negative for chest pain and leg swelling.   Gastrointestinal: Positive for blood in stool, diarrhea and nausea. Negative for abdominal distention and constipation.        +abdominal cramping   Genitourinary: Negative for decreased urine volume and difficulty urinating.   Musculoskeletal: Negative for arthralgias and back pain.   Skin: Negative for color change and pallor.   Neurological: Positive for weakness. Negative for dizziness.   Psychiatric/Behavioral: Negative for agitation and confusion. The patient is not nervous/anxious.      Objective:     Vital Signs (Most Recent):  Temp: 96.2 °F (35.7 °C) (03/15/18 1531)  Pulse: 74 (03/15/18 1531)  Resp: 18 (03/15/18 1531)  BP: 120/75 (03/15/18 1531)  SpO2: 96 % (03/15/18 1531) Vital Signs (24h Range):  Temp:  [96.2 °F (35.7 °C)-98.2 °F (36.8 °C)] 96.2 °F (35.7 °C)  Pulse:  [61-76] 74  Resp:  [16-18] 18  SpO2:  [92 %-99 %] 96 %  BP: (110-142)/(70-81) 120/75        Body mass index is 21.13 kg/m².  No intake or output data in the 24 hours ending 03/15/18 1654   Physical Exam   Constitutional: She is oriented to person, place, and time. She appears well-developed and well-nourished.   HENT:   Head: Normocephalic and atraumatic.   Right Ear: External ear normal.   Left Ear: External ear normal.   Eyes: EOM are normal. Pupils are equal, round, and reactive to light. No scleral icterus.   Neck: Neck supple.   Cardiovascular: Normal rate, regular rhythm, normal " heart sounds and intact distal pulses.    Pulmonary/Chest: Effort normal and breath sounds normal.   Abdominal: Soft. Bowel sounds are normal. She exhibits no distension. There is no tenderness. There is no guarding.   Musculoskeletal: She exhibits no edema.   Neurological: She is alert and oriented to person, place, and time. No cranial nerve deficit or sensory deficit. Coordination normal.   Skin: Skin is warm and dry. Capillary refill takes less than 2 seconds.   Psychiatric: She has a normal mood and affect. Her behavior is normal.       Significant Labs:     CBC:    Recent Labs  Lab 03/14/18 0718 03/15/18  0524   WBC 6.95 11.74   GRAN 64.3  4.5 77.9*  9.2*   HGB 13.8 13.9   HCT 42.0 42.1    279       Chem 10:    Recent Labs  Lab 03/14/18  0718 03/15/18  0524    141   K 3.7 4.4    108   CO2 27 24   BUN 8 14   CREATININE 0.9 0.8   GLU 95 111*   CALCIUM 8.9 9.3   MG 1.9 1.9   PHOS 4.0  --        LFTs:    Recent Labs  Lab 03/14/18  0718 03/15/18  0524   ALKPHOS 86 86   BILITOT 0.5 0.2   AST 11 10   ALT 8* 9*   ALBUMIN 3.5 3.3*       Significant Imaging:   None new
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
## 2018-03-15 NOTE — PLAN OF CARE
Problem: Patient Care Overview  Goal: Plan of Care Review  Outcome: Ongoing (interventions implemented as appropriate)  Patient AAOx4. Patient VSS. Patient complains of abdominal cramping and headache; pain managed with PRN medications. Patient complains of diarrhea.  Patient free from falls or injury during shift. Patient repositioned freely. Patient in bed, bed in lowest position, call light in reach, bed alarm set, and personal items at bedside. Will continue to monitor.

## 2018-03-16 LAB
ALBUMIN SERPL BCP-MCNC: 3.5 G/DL
ALP SERPL-CCNC: 83 U/L
ALT SERPL W/O P-5'-P-CCNC: 8 U/L
ANION GAP SERPL CALC-SCNC: 9 MMOL/L
AST SERPL-CCNC: 10 U/L
BASOPHILS # BLD AUTO: 0.03 K/UL
BASOPHILS NFR BLD: 0.3 %
BILIRUB SERPL-MCNC: 0.3 MG/DL
BUN SERPL-MCNC: 16 MG/DL
CALCIUM SERPL-MCNC: 9.3 MG/DL
CHLORIDE SERPL-SCNC: 104 MMOL/L
CO2 SERPL-SCNC: 26 MMOL/L
CREAT SERPL-MCNC: 0.8 MG/DL
DIFFERENTIAL METHOD: ABNORMAL
EOSINOPHIL # BLD AUTO: 0 K/UL
EOSINOPHIL NFR BLD: 0.2 %
ERYTHROCYTE [DISTWIDTH] IN BLOOD BY AUTOMATED COUNT: 12.1 %
EST. GFR  (AFRICAN AMERICAN): >60 ML/MIN/1.73 M^2
EST. GFR  (NON AFRICAN AMERICAN): >60 ML/MIN/1.73 M^2
GLUCOSE SERPL-MCNC: 111 MG/DL
HCT VFR BLD AUTO: 43.7 %
HGB BLD-MCNC: 14 G/DL
IMM GRANULOCYTES # BLD AUTO: 0.07 K/UL
IMM GRANULOCYTES NFR BLD AUTO: 0.8 %
LYMPHOCYTES # BLD AUTO: 1.4 K/UL
LYMPHOCYTES NFR BLD: 15.4 %
MAGNESIUM SERPL-MCNC: 2 MG/DL
MCH RBC QN AUTO: 27.7 PG
MCHC RBC AUTO-ENTMCNC: 32 G/DL
MCV RBC AUTO: 87 FL
MONOCYTES # BLD AUTO: 0.5 K/UL
MONOCYTES NFR BLD: 5.6 %
NEUTROPHILS # BLD AUTO: 6.8 K/UL
NEUTROPHILS NFR BLD: 77.7 %
NRBC BLD-RTO: 0 /100 WBC
PLATELET # BLD AUTO: 276 K/UL
PMV BLD AUTO: 10.8 FL
POTASSIUM SERPL-SCNC: 4.3 MMOL/L
PROT SERPL-MCNC: 7.1 G/DL
RBC # BLD AUTO: 5.05 M/UL
SODIUM SERPL-SCNC: 139 MMOL/L
WBC # BLD AUTO: 8.75 K/UL

## 2018-03-16 PROCEDURE — 11000001 HC ACUTE MED/SURG PRIVATE ROOM

## 2018-03-16 PROCEDURE — 25000003 PHARM REV CODE 250: Performed by: INTERNAL MEDICINE

## 2018-03-16 PROCEDURE — 80053 COMPREHEN METABOLIC PANEL: CPT

## 2018-03-16 PROCEDURE — 94761 N-INVAS EAR/PLS OXIMETRY MLT: CPT

## 2018-03-16 PROCEDURE — 85025 COMPLETE CBC W/AUTO DIFF WBC: CPT

## 2018-03-16 PROCEDURE — 83735 ASSAY OF MAGNESIUM: CPT

## 2018-03-16 PROCEDURE — 63600175 PHARM REV CODE 636 W HCPCS: Performed by: STUDENT IN AN ORGANIZED HEALTH CARE EDUCATION/TRAINING PROGRAM

## 2018-03-16 PROCEDURE — 25000003 PHARM REV CODE 250: Performed by: STUDENT IN AN ORGANIZED HEALTH CARE EDUCATION/TRAINING PROGRAM

## 2018-03-16 PROCEDURE — 99232 SBSQ HOSP IP/OBS MODERATE 35: CPT | Mod: ,,, | Performed by: HOSPITALIST

## 2018-03-16 PROCEDURE — 25000003 PHARM REV CODE 250: Performed by: SURGERY

## 2018-03-16 PROCEDURE — 99232 SBSQ HOSP IP/OBS MODERATE 35: CPT | Mod: ,,, | Performed by: INTERNAL MEDICINE

## 2018-03-16 PROCEDURE — 36415 COLL VENOUS BLD VENIPUNCTURE: CPT

## 2018-03-16 RX ADMIN — Medication 125 MG: at 05:03

## 2018-03-16 RX ADMIN — MESALAMINE 1000 MG: 1000 SUPPOSITORY RECTAL at 09:03

## 2018-03-16 RX ADMIN — Medication 125 MG: at 11:03

## 2018-03-16 RX ADMIN — AMLODIPINE BESYLATE 5 MG: 5 TABLET ORAL at 09:03

## 2018-03-16 RX ADMIN — VITAMIN D, TAB 1000IU (100/BT) 1000 UNITS: 25 TAB at 09:03

## 2018-03-16 RX ADMIN — MESALAMINE 1.5 G: 0.38 CAPSULE, EXTENDED RELEASE ORAL at 09:03

## 2018-03-16 RX ADMIN — Medication 125 MG: at 06:03

## 2018-03-16 RX ADMIN — SODIUM CHLORIDE: 0.9 INJECTION, SOLUTION INTRAVENOUS at 09:03

## 2018-03-16 RX ADMIN — DEXTROSE: 50 INJECTION, SOLUTION INTRAVENOUS at 04:03

## 2018-03-16 NOTE — PROGRESS NOTES
[facility]  Gastroenterology  Progress Note    Patient Name: Ansley Bautista  MRN: [id number]  Admission Date: 3/13/2018  Hospital Length of Stay: 3 days  Code Status: Full Code   Attending Provider: Soni Capellan MD  Consulting Provider: Armaan Navarrete MD  Primary Care Physician: Primary Doctor No  Principal Problem: Ulcerative pancolitis with rectal bleeding      Subjective:     Interval History: Patient had 11 bowel movements over the past 24hours. Minimal blood in her stools. Reports some abdominal cramping. No fevers or chills.    Review of Systems   Constitutional: Negative for chills and fever.   HENT: Negative for sore throat and trouble swallowing.    Respiratory: Negative for cough and shortness of breath.    Cardiovascular: Negative for chest pain and leg swelling.   Gastrointestinal: Positive for blood in stool and diarrhea. Negative for abdominal pain, nausea and vomiting.   Genitourinary: Negative for decreased urine volume and difficulty urinating.   Musculoskeletal: Negative for arthralgias and back pain.   Skin: Negative for color change and pallor.   Neurological: Negative for dizziness and light-headedness.   Psychiatric/Behavioral: Negative for agitation and confusion.     Objective:     Vital Signs (Most Recent):  Temp: 98.7 °F (37.1 °C) (03/16/18 1532)  Pulse: 74 (03/16/18 1532)  Resp: 16 (03/16/18 1532)  BP: 139/84 (03/16/18 1532)  SpO2: 95 % (03/16/18 1532) Vital Signs (24h Range):  Temp:  [97.6 °F (36.4 °C)-98.7 °F (37.1 °C)] 98.7 °F (37.1 °C)  Pulse:  [62-74] 74  Resp:  [16-18] 16  SpO2:  [95 %-99 %] 95 %  BP: (120-159)/(71-98) 139/84     Weight: 51.9 kg (114 lb 6.7 oz) (03/16/18 0852)  Body mass index is 20.93 kg/m².    No intake or output data in the 24 hours ending 03/16/18 1714    Lines/Drains/Airways     Peripheral Intravenous Line                 Peripheral IV - Single Lumen 03/14/18 0430 Left Antecubital 2 days                Physical Exam   Constitutional: She is  oriented to person, place, and time. She appears well-developed and well-nourished. No distress.   HENT:   Mouth/Throat: Oropharynx is clear and moist.   Eyes: No scleral icterus.   Cardiovascular: Normal rate and regular rhythm.    Pulmonary/Chest: Effort normal and breath sounds normal.   Abdominal: Soft. Bowel sounds are normal. She exhibits no distension and no mass. There is no tenderness. There is no guarding.   Musculoskeletal: She exhibits no edema or deformity.   Neurological: She is alert and oriented to person, place, and time.   Skin: Skin is warm and dry.   Psychiatric: She has a normal mood and affect.   Vitals reviewed.      Significant Labs:  CBC:   Recent Labs  Lab 03/15/18  0524 03/16/18  0442   WBC 11.74 8.75   HGB 13.9 14.0   HCT 42.1 43.7    276     CMP:   Recent Labs  Lab 03/16/18  0442   *   CALCIUM 9.3   ALBUMIN 3.5   PROT 7.1      K 4.3   CO2 26      BUN 16   CREATININE 0.8   ALKPHOS 83   ALT 8*   AST 10   BILITOT 0.3     Coagulation: No results for input(s): PT, INR, APTT in the last 48 hours.          Assessment/Plan:     * Ulcerative pancolitis with rectal bleeding    53 yo female with ulcerative pancolitis. Patient seen yesterday for an urgent clinic visit with plans for direct admission.  She had a positive C. Diff result on 2/16/2018 and was started on oral vancomycin 125 mg PO QID.  Her diarrhea worsened by 3/9/2018 and the oral vancomycin was increased to 500 mg TID as off label treatment for the UC and also giving the vanco more time to work for c diff. We were concerned about steroids in case this was UC flare in setting of C diff and due to stable symptoms was going to give vanco more time to work . We were also concerned about repeating stool C diff to early due to risk of false positive results. She has been on that dose along with align 1 tablet BID, apriso 1.5 gm/day, and canasa 1000 mg MS QHS for 3 full days.  On 3/11/2018 she called with worsening  diarrhea up to 12 small volume stools per day with 4 containing blood and 3 nocturnal BMs so we discussed admission. Flex sig today showed signs of active colitis, biopsies for CMV were taken. C. Diff resulted as negative. Will treat as UC flare. We discussed that we will likely start Imuran on discharge, pending her clinical course. We explained to her the risks of Imuran and patient understood.    Recommendations:  - Solumedrol 40mg/24h gtt started today (day 3 of steroids)  - Will check CRP tomorrow  - Continue Vancomycin to 125mg PO QID  - Continue Apriso 1.5g daily  - Start chemical DVT ppx as IBD patients are at increased risk for VTE  - Avoid NSAIDs and opiates        Diarrhea    # Diarrhea    - UC flare  - will f/u on stool O/P, giardia/crypto, cultures  - C diff negative         Recurrent Clostridium difficile diarrhea    # C diff colitis- resolved but recurrent  - decrease oral vancomycin to 125 mg po QID  - after well on prednisone then we will do a special taper to help prevent recurrence               Thank you for your consult. I will follow-up with patient. Please contact us if you have any additional questions.    Armaan Navarrete MD  Gastroenterology  [facility]

## 2018-03-16 NOTE — SUBJECTIVE & OBJECTIVE
Subjective:     Interval History: Patient had 11 bowel movements over the past 24hours. Minimal blood in her stools. Reports some abdominal cramping. No fevers or chills.    Review of Systems   Constitutional: Negative for chills and fever.   HENT: Negative for sore throat and trouble swallowing.    Respiratory: Negative for cough and shortness of breath.    Cardiovascular: Negative for chest pain and leg swelling.   Gastrointestinal: Positive for blood in stool and diarrhea. Negative for abdominal pain, nausea and vomiting.   Genitourinary: Negative for decreased urine volume and difficulty urinating.   Musculoskeletal: Negative for arthralgias and back pain.   Skin: Negative for color change and pallor.   Neurological: Negative for dizziness and light-headedness.   Psychiatric/Behavioral: Negative for agitation and confusion.     Objective:     Vital Signs (Most Recent):  Temp: 98.7 °F (37.1 °C) (03/16/18 1532)  Pulse: 74 (03/16/18 1532)  Resp: 16 (03/16/18 1532)  BP: 139/84 (03/16/18 1532)  SpO2: 95 % (03/16/18 1532) Vital Signs (24h Range):  Temp:  [97.6 °F (36.4 °C)-98.7 °F (37.1 °C)] 98.7 °F (37.1 °C)  Pulse:  [62-74] 74  Resp:  [16-18] 16  SpO2:  [95 %-99 %] 95 %  BP: (120-159)/(71-98) 139/84     Weight: 51.9 kg (114 lb 6.7 oz) (03/16/18 0852)  Body mass index is 20.93 kg/m².    No intake or output data in the 24 hours ending 03/16/18 1714    Lines/Drains/Airways     Peripheral Intravenous Line                 Peripheral IV - Single Lumen 03/14/18 0430 Left Antecubital 2 days                Physical Exam   Constitutional: She is oriented to person, place, and time. She appears well-developed and well-nourished. No distress.   HENT:   Mouth/Throat: Oropharynx is clear and moist.   Eyes: No scleral icterus.   Cardiovascular: Normal rate and regular rhythm.    Pulmonary/Chest: Effort normal and breath sounds normal.   Abdominal: Soft. Bowel sounds are normal. She exhibits no distension and no mass. There is no  tenderness. There is no guarding.   Musculoskeletal: She exhibits no edema or deformity.   Neurological: She is alert and oriented to person, place, and time.   Skin: Skin is warm and dry.   Psychiatric: She has a normal mood and affect.   Vitals reviewed.      Significant Labs:  CBC:   Recent Labs  Lab 03/15/18  0524 03/16/18  0442   WBC 11.74 8.75   HGB 13.9 14.0   HCT 42.1 43.7    276     CMP:   Recent Labs  Lab 03/16/18 0442   *   CALCIUM 9.3   ALBUMIN 3.5   PROT 7.1      K 4.3   CO2 26      BUN 16   CREATININE 0.8   ALKPHOS 83   ALT 8*   AST 10   BILITOT 0.3     Coagulation: No results for input(s): PT, INR, APTT in the last 48 hours.

## 2018-03-16 NOTE — ASSESSMENT & PLAN NOTE
51 yo female with ulcerative pancolitis. Patient seen yesterday for an urgent clinic visit with plans for direct admission.  She had a positive C. Diff result on 2/16/2018 and was started on oral vancomycin 125 mg PO QID.  Her diarrhea worsened by 3/9/2018 and the oral vancomycin was increased to 500 mg TID as off label treatment for the UC and also giving the vanco more time to work for c diff. We were concerned about steroids in case this was UC flare in setting of C diff and due to stable symptoms was going to give vanco more time to work . We were also concerned about repeating stool C diff to early due to risk of false positive results. She has been on that dose along with align 1 tablet BID, apriso 1.5 gm/day, and canasa 1000 mg [place] QHS for 3 full days.  On 3/11/2018 she called with worsening diarrhea up to 12 small volume stools per day with 4 containing blood and 3 nocturnal BMs so we discussed admission. Flex sig today showed signs of active colitis, biopsies for CMV were taken. C. Diff resulted as negative. Will treat as UC flare. We discussed that we will likely start Imuran on discharge, pending her clinical course. We explained to her the risks of Imuran and patient understood.    Recommendations:  - Solumedrol 40mg/24h gtt started today (day 3 of steroids)  - Will check CRP tomorrow  - Continue Vancomycin to 125mg PO QID  - Continue Apriso 1.5g daily  - Start chemical DVT ppx as IBD patients are at increased risk for VTE  - Avoid NSAIDs and opiates

## 2018-03-16 NOTE — PLAN OF CARE
Problem: Patient Care Overview  Goal: Plan of Care Review  Outcome: Ongoing (interventions implemented as appropriate)   VS and assessment performed per orders. Pt does continue to report mild abdominal cramping and diarrhea. Solu-medrol IV at 4.2ml/hr.  Patient progressing toward goals as tolerated. Plan of care reviewed with pt, all concerns addressed. Will continue to monitor

## 2018-03-16 NOTE — ASSESSMENT & PLAN NOTE
- Improving  - First diagnosed in 8/2016  - Likely UC flare since C diff testing negative  - CRP slightly elevated to 12.8,  CRP ordered for tomorrow  - Resume oral + rectal mesalamine  - C diff negative  - Flex sig demonstrating active colitis, continue solumedrol per GI recommendation  - Low residue diet
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
## 2018-03-16 NOTE — ASSESSMENT & PLAN NOTE
- Stable  - Tested negative on admission  - Continue oral vancomycin 125 mg QID  - Will plan on slow taper per GI recommendations  - Other stool studies pending   - Discontinue contact precautions

## 2018-03-16 NOTE — SUBJECTIVE & OBJECTIVE
Interval History: No acute events overnight.  Patient reports having 12 bowel movements with only one with small volume blood.  Continued on solumedrol gtt.   Afebrile with stable vitals and labs.      Review of Systems   Constitutional: Positive for activity change and appetite change. Negative for chills and fever.   HENT: Negative for sore throat and trouble swallowing.    Respiratory: Negative for cough and shortness of breath.    Cardiovascular: Negative for chest pain and leg swelling.   Gastrointestinal: Positive for blood in stool and diarrhea. Negative for abdominal distention, constipation and nausea.        +abdominal cramping   Genitourinary: Negative for decreased urine volume and difficulty urinating.   Musculoskeletal: Negative for arthralgias and back pain.   Skin: Negative for color change and pallor.   Neurological: Negative for dizziness and weakness.   Psychiatric/Behavioral: Negative for agitation and confusion. The patient is not nervous/anxious.      Objective:     Vital Signs (Most Recent):  Temp: 97.9 °F (36.6 °C) (03/16/18 0323)  Pulse: 65 (03/16/18 0323)  Resp: 16 (03/16/18 0323)  BP: 120/71 (03/16/18 0323)  SpO2: 97 % (03/16/18 0323) Vital Signs (24h Range):  Temp:  [96.2 °F (35.7 °C)-98.2 °F (36.8 °C)] 97.9 °F (36.6 °C)  Pulse:  [61-74] 65  Resp:  [16-18] 16  SpO2:  [96 %-99 %] 97 %  BP: (120-142)/(71-86) 120/71        Body mass index is 21.13 kg/m².  No intake or output data in the 24 hours ending 03/16/18 0839   Physical Exam   Constitutional: She is oriented to person, place, and time. She appears well-developed and well-nourished.   HENT:   Head: Normocephalic and atraumatic.   Right Ear: External ear normal.   Left Ear: External ear normal.   Eyes: EOM are normal. Pupils are equal, round, and reactive to light. No scleral icterus.   Neck: Neck supple.   Cardiovascular: Normal rate, regular rhythm, normal heart sounds and intact distal pulses.    Pulmonary/Chest: Effort normal and  breath sounds normal.   Abdominal: Soft. Bowel sounds are normal. She exhibits no distension. There is no tenderness. There is no guarding.   Musculoskeletal: She exhibits no edema.   Neurological: She is alert and oriented to person, place, and time. No cranial nerve deficit or sensory deficit. Coordination normal.   Skin: Skin is warm and dry. Capillary refill takes less than 2 seconds.   Psychiatric: She has a normal mood and affect. Her behavior is normal.       Significant Labs:   Recent Results (from the past 24 hour(s))   Comprehensive Metabolic Panel (CMP)    Collection Time: 03/16/18  4:42 AM   Result Value Ref Range    Sodium 139 136 - 145 mmol/L    Potassium 4.3 3.5 - 5.1 mmol/L    Chloride 104 95 - 110 mmol/L    CO2 26 23 - 29 mmol/L    Glucose 111 (H) 70 - 110 mg/dL    BUN, Bld 16 6 - 20 mg/dL    Creatinine 0.8 0.5 - 1.4 mg/dL    Calcium 9.3 8.7 - 10.5 mg/dL    Total Protein 7.1 6.0 - 8.4 g/dL    Albumin 3.5 3.5 - 5.2 g/dL    Total Bilirubin 0.3 0.1 - 1.0 mg/dL    Alkaline Phosphatase 83 55 - 135 U/L    AST 10 10 - 40 U/L    ALT 8 (L) 10 - 44 U/L    Anion Gap 9 8 - 16 mmol/L    eGFR if African American >60.0 >60 mL/min/1.73 m^2    eGFR if non African American >60.0 >60 mL/min/1.73 m^2   Magnesium    Collection Time: 03/16/18  4:42 AM   Result Value Ref Range    Magnesium 2.0 1.6 - 2.6 mg/dL   CBC with Automated Differential    Collection Time: 03/16/18  4:42 AM   Result Value Ref Range    WBC 8.75 3.90 - 12.70 K/uL    RBC 5.05 4.00 - 5.40 M/uL    Hemoglobin 14.0 12.0 - 16.0 g/dL    Hematocrit 43.7 37.0 - 48.5 %    MCV 87 82 - 98 fL    MCH 27.7 27.0 - 31.0 pg    MCHC 32.0 32.0 - 36.0 g/dL    RDW 12.1 11.5 - 14.5 %    Platelets 276 150 - 350 K/uL    MPV 10.8 9.2 - 12.9 fL    Immature Granulocytes 0.8 (H) 0.0 - 0.5 %    Gran # (ANC) 6.8 1.8 - 7.7 K/uL    Immature Grans (Abs) 0.07 (H) 0.00 - 0.04 K/uL    Lymph # 1.4 1.0 - 4.8 K/uL    Mono # 0.5 0.3 - 1.0 K/uL    Eos # 0.0 0.0 - 0.5 K/uL    Baso # 0.03 0.00  - 0.20 K/uL    nRBC 0 0 /100 WBC    Gran% 77.7 (H) 38.0 - 73.0 %    Lymph% 15.4 (L) 18.0 - 48.0 %    Mono% 5.6 4.0 - 15.0 %    Eosinophil% 0.2 0.0 - 8.0 %    Basophil% 0.3 0.0 - 1.9 %    Differential Method Automated          Significant Imaging: No new imaging

## 2018-03-17 VITALS
BODY MASS INDEX: 21.06 KG/M2 | SYSTOLIC BLOOD PRESSURE: 142 MMHG | HEART RATE: 66 BPM | DIASTOLIC BLOOD PRESSURE: 86 MMHG | WEIGHT: 114.44 LBS | HEIGHT: 62 IN | RESPIRATION RATE: 16 BRPM | OXYGEN SATURATION: 94 % | TEMPERATURE: 98 F

## 2018-03-17 LAB
ALBUMIN SERPL BCP-MCNC: 3.4 G/DL
ALP SERPL-CCNC: 80 U/L
ALT SERPL W/O P-5'-P-CCNC: 9 U/L
ANION GAP SERPL CALC-SCNC: 8 MMOL/L
AST SERPL-CCNC: 9 U/L
BASOPHILS # BLD AUTO: 0.05 K/UL
BASOPHILS NFR BLD: 0.6 %
BILIRUB SERPL-MCNC: 0.3 MG/DL
BUN SERPL-MCNC: 20 MG/DL
CALCIUM SERPL-MCNC: 9 MG/DL
CHLORIDE SERPL-SCNC: 105 MMOL/L
CO2 SERPL-SCNC: 28 MMOL/L
CREAT SERPL-MCNC: 0.8 MG/DL
CRP SERPL-MCNC: 4 MG/L
DIFFERENTIAL METHOD: ABNORMAL
EOSINOPHIL # BLD AUTO: 0.1 K/UL
EOSINOPHIL NFR BLD: 1.8 %
ERYTHROCYTE [DISTWIDTH] IN BLOOD BY AUTOMATED COUNT: 12 %
EST. GFR  (AFRICAN AMERICAN): >60 ML/MIN/1.73 M^2
EST. GFR  (NON AFRICAN AMERICAN): >60 ML/MIN/1.73 M^2
GLUCOSE SERPL-MCNC: 89 MG/DL
HCT VFR BLD AUTO: 44.9 %
HGB BLD-MCNC: 14.3 G/DL
IMM GRANULOCYTES # BLD AUTO: 0.09 K/UL
IMM GRANULOCYTES NFR BLD AUTO: 1.1 %
LYMPHOCYTES # BLD AUTO: 1.7 K/UL
LYMPHOCYTES NFR BLD: 21.1 %
MAGNESIUM SERPL-MCNC: 2 MG/DL
MCH RBC QN AUTO: 27.7 PG
MCHC RBC AUTO-ENTMCNC: 31.8 G/DL
MCV RBC AUTO: 87 FL
MONOCYTES # BLD AUTO: 0.9 K/UL
MONOCYTES NFR BLD: 10.8 %
NEUTROPHILS # BLD AUTO: 5.1 K/UL
NEUTROPHILS NFR BLD: 64.6 %
NRBC BLD-RTO: 0 /100 WBC
PLATELET # BLD AUTO: 263 K/UL
PMV BLD AUTO: 10.4 FL
POTASSIUM SERPL-SCNC: 3.7 MMOL/L
PROT SERPL-MCNC: 6.8 G/DL
RBC # BLD AUTO: 5.17 M/UL
SODIUM SERPL-SCNC: 141 MMOL/L
WBC # BLD AUTO: 7.85 K/UL

## 2018-03-17 PROCEDURE — 80053 COMPREHEN METABOLIC PANEL: CPT

## 2018-03-17 PROCEDURE — 86140 C-REACTIVE PROTEIN: CPT

## 2018-03-17 PROCEDURE — 83735 ASSAY OF MAGNESIUM: CPT

## 2018-03-17 PROCEDURE — 25000003 PHARM REV CODE 250: Performed by: SURGERY

## 2018-03-17 PROCEDURE — 25000003 PHARM REV CODE 250: Performed by: STUDENT IN AN ORGANIZED HEALTH CARE EDUCATION/TRAINING PROGRAM

## 2018-03-17 PROCEDURE — 36415 COLL VENOUS BLD VENIPUNCTURE: CPT

## 2018-03-17 PROCEDURE — 63600175 PHARM REV CODE 636 W HCPCS: Performed by: STUDENT IN AN ORGANIZED HEALTH CARE EDUCATION/TRAINING PROGRAM

## 2018-03-17 PROCEDURE — 85025 COMPLETE CBC W/AUTO DIFF WBC: CPT

## 2018-03-17 PROCEDURE — 99238 HOSP IP/OBS DSCHRG MGMT 30/<: CPT | Mod: ,,, | Performed by: HOSPITALIST

## 2018-03-17 PROCEDURE — 25000003 PHARM REV CODE 250: Performed by: INTERNAL MEDICINE

## 2018-03-17 RX ORDER — FLUCONAZOLE 150 MG/1
150 TABLET ORAL ONCE
Status: COMPLETED | OUTPATIENT
Start: 2018-03-17 | End: 2018-03-17

## 2018-03-17 RX ORDER — PREDNISONE 10 MG/1
40 TABLET ORAL DAILY
Qty: 120 TABLET | Refills: 0 | Status: SHIPPED | OUTPATIENT
Start: 2018-03-17 | End: 2018-04-16

## 2018-03-17 RX ADMIN — Medication 125 MG: at 11:03

## 2018-03-17 RX ADMIN — MESALAMINE 1.5 G: 0.38 CAPSULE, EXTENDED RELEASE ORAL at 09:03

## 2018-03-17 RX ADMIN — FLUCONAZOLE 150 MG: 150 TABLET ORAL at 11:03

## 2018-03-17 RX ADMIN — DEXTROSE: 50 INJECTION, SOLUTION INTRAVENOUS at 09:03

## 2018-03-17 RX ADMIN — AMLODIPINE BESYLATE 5 MG: 5 TABLET ORAL at 09:03

## 2018-03-17 RX ADMIN — Medication 125 MG: at 05:03

## 2018-03-17 RX ADMIN — VITAMIN D, TAB 1000IU (100/BT) 1000 UNITS: 25 TAB at 09:03

## 2018-03-17 NOTE — ASSESSMENT & PLAN NOTE
- Stable  - Tested negative on admission  - Continue oral vancomycin 125 mg QID  - Will plan on slow taper per GI recommendations  - Other stool studies negative

## 2018-03-17 NOTE — TREATMENT PLAN
GI follow up note    Patient reported 10-12 trips to restroom, mostly gas, minimal blood, occasional 1-2 episodes of formed bowel movements.     Remains afebrile. No abdominal and rectal pain. Tolerating diet.     CRP improved from 12 to 4. Completed her day #3 of IV Solumedrol.     She insisted to be discharged today.     If discharged today,   Recommend Vancomycin 125mg QID - at least a month supply.   Oral prednisone 40mg daily (Please prescribe 10mg tablets)    Case discussed with Dr. Walsh and she will follow up with her.    Ira Galdamez MD  Gastroenterology & Hepatology Fellow  Pager: [phone]
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
## 2018-03-20 ENCOUNTER — DOCUMENTATION ONLY (OUTPATIENT)
Dept: GASTROENTEROLOGY | Facility: CLINIC | Age: 53
End: 2018-03-20

## 2018-03-20 ENCOUNTER — TELEPHONE (OUTPATIENT)
Dept: GASTROENTEROLOGY | Facility: CLINIC | Age: 53
End: 2018-03-20

## 2018-03-20 ENCOUNTER — PATIENT MESSAGE (OUTPATIENT)
Dept: GASTROENTEROLOGY | Facility: CLINIC | Age: 53
End: 2018-03-20

## 2018-03-20 RX ORDER — AMLODIPINE BESYLATE 5 MG/1
5 TABLET ORAL DAILY
COMMUNITY
End: 2019-12-02 | Stop reason: SDUPTHER

## 2018-03-20 RX ORDER — AZATHIOPRINE 50 MG/1
50 TABLET ORAL DAILY
Qty: 30 TABLET | Refills: 0 | Status: SHIPPED | OUTPATIENT
Start: 2018-03-20 | End: 2018-04-12 | Stop reason: SDUPTHER

## 2018-03-20 NOTE — TELEPHONE ENCOUNTER
Spoke to patient re symptoms.    Having 7-8 loose-soft BMs/day with 4 of them nocturnal, urgency, and 1 episode of fecal incontinence on 3/18/2018.  She is currently on Prednisone 40 mg PO daily, vancomycin 125 mg PO QID, Apriso 1.5 gm/day, and canasa QHS.  Dr. Walsh had a long discussion with her regarding Imuran while she was an inpatient.  Patient has no additional questions.  TPMT intermediate.  Will start Imuran 50 mg PO daily, RX to be sent to patient's pharmacy.  Patient will also be scheduled for a f/u 3/28/2018 with me in clinic and we will discuss tapering of prednisone and oral vancomycin.  She was instructed to stay on all of her medications and not change any of the doses.  Patient verbalizes understanding and agrees with the treatment plan.  Questions answered.    KG

## 2018-03-20 NOTE — TELEPHONE ENCOUNTER
Called and spoke with pt  I explained our pharmacy has the Imuran for $4.05 so we are sending it there.  I also told her I was putting her on the schedule for 10:40 on 03/28 and she wanted something later in case she is feeling better and can at least work that morning.  Talked with Magdalena and I got pt scheduled for 03/28 @ 1:40

## 2018-03-21 ENCOUNTER — TELEPHONE (OUTPATIENT)
Dept: GASTROENTEROLOGY | Facility: CLINIC | Age: 53
End: 2018-03-21

## 2018-03-21 NOTE — TELEPHONE ENCOUNTER
----- Message from TANGELA Gamez sent at 3/20/2018 12:56 PM CDT -----  Call Mrs. Bautista to see if she can come in Thursday afternoon with Dr. Walsh in clinic.  Move her appointment from 3/28/2018    Thanks,  Magdalena

## 2018-03-22 ENCOUNTER — OFFICE VISIT (OUTPATIENT)
Dept: GASTROENTEROLOGY | Facility: CLINIC | Age: 53
End: 2018-03-22
Payer: COMMERCIAL

## 2018-03-22 ENCOUNTER — PATIENT MESSAGE (OUTPATIENT)
Dept: GASTROENTEROLOGY | Facility: CLINIC | Age: 53
End: 2018-03-22

## 2018-03-22 VITALS
SYSTOLIC BLOOD PRESSURE: 141 MMHG | HEART RATE: 74 BPM | BODY MASS INDEX: 20.69 KG/M2 | DIASTOLIC BLOOD PRESSURE: 87 MMHG | RESPIRATION RATE: 16 BRPM | WEIGHT: 112.44 LBS | HEIGHT: 62 IN

## 2018-03-22 DIAGNOSIS — Z91.89 HIGH RISK FOR COLON CANCER: ICD-10-CM

## 2018-03-22 DIAGNOSIS — K51.011 ULCERATIVE PANCOLITIS WITH RECTAL BLEEDING: Primary | ICD-10-CM

## 2018-03-22 DIAGNOSIS — Z79.899 HIGH RISK MEDICATION USE: ICD-10-CM

## 2018-03-22 DIAGNOSIS — F41.9 ANXIETY: ICD-10-CM

## 2018-03-22 DIAGNOSIS — R79.82 ELEVATED C-REACTIVE PROTEIN (CRP): ICD-10-CM

## 2018-03-22 DIAGNOSIS — A04.71 RECURRENT COLITIS DUE TO CLOSTRIDIUM DIFFICILE: ICD-10-CM

## 2018-03-22 DIAGNOSIS — R19.7 DIARRHEA, UNSPECIFIED TYPE: ICD-10-CM

## 2018-03-22 DIAGNOSIS — I10 ESSENTIAL HYPERTENSION: ICD-10-CM

## 2018-03-22 PROCEDURE — 99999 PR PBB SHADOW E&M-EST. PATIENT-LVL IV: CPT | Mod: PBBFAC,,, | Performed by: NURSE PRACTITIONER

## 2018-03-22 PROCEDURE — 99215 OFFICE O/P EST HI 40 MIN: CPT | Mod: S$GLB,,, | Performed by: NURSE PRACTITIONER
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
## 2018-03-22 NOTE — PATIENT INSTRUCTIONS
Instructions:  - Labs & UA tomorrow, repeat labs CBC/LFTs due 4/3/2018--ok to be done at [facility]  - repeat stool calprotectin tomorrow  - continue apriso 1.5 gm/day for now   - continue oral vancomycin 125 mg PO 4 times daily  - continue prednisone 40 mg PO daily  - continue Imuran 50 mg PO daily  - stop canasa suppositories for now  - sign up for remistart   - schedule for first remicade infusion in 2 weeks  - do not restart florastor  - send us an email with updates next week  - Avoid all NSAIDs (Advil, Ibuprofen, Motrin, Aspirin, Naprosyn, Aleve)  - Yearly Pap Smears recommended   - Yearly Eye exam  - Yearly Skin exam--wear sun block and hats  - Use antibiotics with caution  - Vaccines recommended--LEI hinojosa MA to schedule  Flu shot yearly  Tetanus every 10 years  Hepatitis A series   Pneumonia 13  (PCV13) vaccine initial  Pneumonia 23 (PPSV23) vaccine 1 year after PCV13, then an additional dose in 5 years, then again at age 66  - Follow up with MARK Nichols in 1 month with Dr. Walsh in clinic        Infliximab (Remicade): Biologics - Anti-TNF Agent    - Mechanism of action:  Remicade blocks TNF alpha which plays a role in the inflammatory process for inflammatory bowel disease  - Labs     - needed to determine safety of starting the medication; these may have been already drawn or will be drawn today and include TB test, hepatitis B testing, basic labs    - our office will call you once these labs are back to let you know if it is safe to start the     Remicade infusions    - Repeat labs will be drawn with every infusion to monitor for side effects    - TB test (Quantiferon Gold) will be checked yearly or sooner if needed    Remicade Dosage:  - Loading Dose: 5 mg/kg IV week 0, 2, 6 (infused over 2 hours)  - Maintenance Dose: 5 mg/kg mg IV every 8 weeks  - 2-3 hour infusion    Risks of Remicade:  Stop therapy due to adverse event=10% (10/100)    Please call us immediately if you develop any of the  below problems    Allergic reactions  - <2% (2/100) develop infusion site reactions  - RARE- hives (rash), difficulty breathing, chest pain/tightness, high or low blood pressure, swelling of the face and hands, fever or chills    Serious Infections=3% (3/100)  fever, tiredness, flu, open sores, warm red painful skin    Blood Disorders  fever that doesn't go away, bruising, bleeding, severe paleness    Non-Hodgkins Lymphoma=0.06% (6/10,000)    Drug related lupus-like reaction=1% (1/100)  chest discomfort or pain that does not go away, shortness of breath, joint pain, rash on the cheeks or arms that gets worse in the sun    Psoriasis  new or worsening red scaly patches or raised bumps on the skin that are filled with pus     Case Reports Only: Multiple Sclerosis and Guillain [place] Syndrome  Numbness/weakness/tingling of your hands and feet, changes in your vision or seizures    Case Reports Only: New or worsening Congestive Heart Failure  shortness of breath, swelling in your ankles or feet, sudden weight gain    Case Reports Only: Serious Liver Injury  jaundice (yellow skin or eyes), dark brown urine, right-sided abdominal pain, fever, severe itchiness    - All Immunizations ideally should be up to date prior to starting therapy--NO LIVE vaccines during therapy or 8 weeks prior to therapy  - Live Vaccines Include:   --Intranasal Influenza A/B  --Measles, Mumps, Rubella (MMR)  --Rotavirus  --Typhoid (oral)  --Vaccina (Smallpox)  --Varicella (Chicken Pox)  --Yellow Fever  --Zoster

## 2018-03-23 ENCOUNTER — LAB VISIT (OUTPATIENT)
Dept: LAB | Facility: HOSPITAL | Age: 53
End: 2018-03-23
Payer: COMMERCIAL

## 2018-03-23 ENCOUNTER — PATIENT MESSAGE (OUTPATIENT)
Dept: GASTROENTEROLOGY | Facility: CLINIC | Age: 53
End: 2018-03-23

## 2018-03-23 DIAGNOSIS — R79.82 ELEVATED C-REACTIVE PROTEIN (CRP): ICD-10-CM

## 2018-03-23 DIAGNOSIS — Z79.899 HIGH RISK MEDICATION USE: ICD-10-CM

## 2018-03-23 DIAGNOSIS — K51.011 ULCERATIVE PANCOLITIS WITH RECTAL BLEEDING: ICD-10-CM

## 2018-03-23 LAB
ALBUMIN SERPL BCP-MCNC: 3.7 G/DL
ALP SERPL-CCNC: 78 U/L
ALT SERPL W/O P-5'-P-CCNC: 10 U/L
AST SERPL-CCNC: 8 U/L
BASOPHILS # BLD AUTO: 0.03 K/UL
BASOPHILS NFR BLD: 0.2 %
BILIRUB DIRECT SERPL-MCNC: 0.2 MG/DL
BILIRUB SERPL-MCNC: 0.5 MG/DL
CRP SERPL-MCNC: 1.4 MG/L
DIFFERENTIAL METHOD: ABNORMAL
EOSINOPHIL # BLD AUTO: 0 K/UL
EOSINOPHIL NFR BLD: 0.3 %
ERYTHROCYTE [DISTWIDTH] IN BLOOD BY AUTOMATED COUNT: 12.2 %
HCT VFR BLD AUTO: 47.8 %
HGB BLD-MCNC: 15.2 G/DL
IMM GRANULOCYTES # BLD AUTO: 0.15 K/UL
IMM GRANULOCYTES NFR BLD AUTO: 1.2 %
LYMPHOCYTES # BLD AUTO: 1.2 K/UL
LYMPHOCYTES NFR BLD: 10.3 %
MCH RBC QN AUTO: 27.9 PG
MCHC RBC AUTO-ENTMCNC: 31.8 G/DL
MCV RBC AUTO: 88 FL
MONOCYTES # BLD AUTO: 1.1 K/UL
MONOCYTES NFR BLD: 9.2 %
NEUTROPHILS # BLD AUTO: 9.5 K/UL
NEUTROPHILS NFR BLD: 78.8 %
NRBC BLD-RTO: 0 /100 WBC
PLATELET # BLD AUTO: 413 K/UL
PMV BLD AUTO: 10.1 FL
PROT SERPL-MCNC: 7.3 G/DL
RBC # BLD AUTO: 5.44 M/UL
WBC # BLD AUTO: 12.02 K/UL

## 2018-03-23 PROCEDURE — 85025 COMPLETE CBC W/AUTO DIFF WBC: CPT

## 2018-03-23 PROCEDURE — 36415 COLL VENOUS BLD VENIPUNCTURE: CPT

## 2018-03-23 PROCEDURE — 80076 HEPATIC FUNCTION PANEL: CPT

## 2018-03-23 PROCEDURE — 86140 C-REACTIVE PROTEIN: CPT

## 2018-03-26 ENCOUNTER — PATIENT MESSAGE (OUTPATIENT)
Dept: GASTROENTEROLOGY | Facility: CLINIC | Age: 53
End: 2018-03-26

## 2018-03-26 NOTE — TELEPHONE ENCOUNTER
Called and spoke with pt  She stated that she is making about 8 - 10 trips to the bathroom in a 24 hour period.  She stated 4 - 5 of them are just gas and mucous and the rest are actual BM's that are formed but soft  She states it is improving but the gas is ridiculous.  She is on Prednisone 40 mgs and is really wanting to start to taper.  She states she is depressed, bloated, gassy, and just not herself because of the steroids. She wants to eat everything and doesn't want to be around anybody because she is just miserable.     I told her I would send message over and we will be in touch tomorrow  She expressed understanding

## 2018-03-27 ENCOUNTER — PATIENT MESSAGE (OUTPATIENT)
Dept: GASTROENTEROLOGY | Facility: CLINIC | Age: 53
End: 2018-03-27

## 2018-03-27 DIAGNOSIS — K51.011 ULCERATIVE PANCOLITIS WITH RECTAL BLEEDING: Primary | ICD-10-CM

## 2018-03-27 RX ORDER — CETIRIZINE HYDROCHLORIDE 10 MG/1
10 TABLET ORAL
Status: CANCELLED | OUTPATIENT
Start: 2018-03-27

## 2018-03-27 RX ORDER — HEPARIN 100 UNIT/ML
500 SYRINGE INTRAVENOUS
Status: CANCELLED | OUTPATIENT
Start: 2018-03-27

## 2018-03-27 RX ORDER — ACETAMINOPHEN 325 MG/1
650 TABLET ORAL
Status: CANCELLED | OUTPATIENT
Start: 2018-03-27

## 2018-03-27 RX ORDER — EPINEPHRINE 1 MG/ML
0.3 INJECTION, SOLUTION, CONCENTRATE INTRAVENOUS
Status: CANCELLED | OUTPATIENT
Start: 2018-03-27

## 2018-03-27 RX ORDER — SODIUM CHLORIDE 0.9 % (FLUSH) 0.9 %
10 SYRINGE (ML) INJECTION
Status: CANCELLED | OUTPATIENT
Start: 2018-03-27

## 2018-03-27 RX ORDER — METHYLPREDNISOLONE SOD SUCC 125 MG
40 VIAL (EA) INJECTION
Status: CANCELLED | OUTPATIENT
Start: 2018-03-27

## 2018-03-27 RX ORDER — DIPHENHYDRAMINE HYDROCHLORIDE 50 MG/ML
25 INJECTION INTRAMUSCULAR; INTRAVENOUS
Status: CANCELLED | OUTPATIENT
Start: 2018-03-27

## 2018-03-27 RX ORDER — IPRATROPIUM BROMIDE AND ALBUTEROL SULFATE 2.5; .5 MG/3ML; MG/3ML
3 SOLUTION RESPIRATORY (INHALATION)
Status: CANCELLED | OUTPATIENT
Start: 2018-03-27

## 2018-03-27 NOTE — TELEPHONE ENCOUNTER
Spoke to patient re symptoms.  She is having 6 soft-formed BMs/day with no blood with most of her BMs in the AM.  Her biggest complaint is the SE of the prednisone which is short-tempered and increased appetite.  She is currently on imuran 50 mg PO daily, prednisone 40 mg PO daily, apriso 1.5 gm/day, and vancomycin 125 mg PO QID.  Patient is onboard with starting remicade if we need to.  Labs from 3/23/2018 WNL, stool calprotectin still pending.  Discussed with Dr. Walsh, will wait for stool calprotectin to decide on starting remicade.  In the meantime, will taper prednisone to 30 mg PO today for 7 days then to 20 mg PO daily and patient will hold at 20 until she gets further instructions from our office.  Therapy plan is placed and will get patient scheduled for infusion in the event that we start--patient requesting a Monday or Wednesday afternoon at 1 pm if possible.  Patient verbalizes understanding and agrees with the treatment plan.  Questions answered.    KG

## 2018-03-28 ENCOUNTER — PATIENT MESSAGE (OUTPATIENT)
Dept: GASTROENTEROLOGY | Facility: CLINIC | Age: 53
End: 2018-03-28

## 2018-03-29 ENCOUNTER — PATIENT MESSAGE (OUTPATIENT)
Dept: GASTROENTEROLOGY | Facility: CLINIC | Age: 53
End: 2018-03-29

## 2018-04-01 ENCOUNTER — PATIENT MESSAGE (OUTPATIENT)
Dept: GASTROENTEROLOGY | Facility: CLINIC | Age: 53
End: 2018-04-01

## 2018-04-02 ENCOUNTER — PATIENT MESSAGE (OUTPATIENT)
Dept: GASTROENTEROLOGY | Facility: CLINIC | Age: 53
End: 2018-04-02

## 2018-04-02 ENCOUNTER — TELEPHONE (OUTPATIENT)
Dept: GASTROENTEROLOGY | Facility: CLINIC | Age: 53
End: 2018-04-02

## 2018-04-02 NOTE — TELEPHONE ENCOUNTER
----- Message from Susan Nieto sent at 4/2/2018  4:39 PM CDT -----  Contact: self - [phone]  rena - giving update on condition - please call patient at

## 2018-04-03 ENCOUNTER — LAB VISIT (OUTPATIENT)
Dept: LAB | Facility: HOSPITAL | Age: 53
End: 2018-04-03
Payer: COMMERCIAL

## 2018-04-03 DIAGNOSIS — Z79.899 HIGH RISK MEDICATION USE: ICD-10-CM

## 2018-04-03 DIAGNOSIS — K51.011 ULCERATIVE PANCOLITIS WITH RECTAL BLEEDING: ICD-10-CM

## 2018-04-03 LAB
ALBUMIN SERPL BCP-MCNC: 3.3 G/DL
ALP SERPL-CCNC: 82 U/L
ALT SERPL W/O P-5'-P-CCNC: 11 U/L
AST SERPL-CCNC: 9 U/L
BASOPHILS # BLD AUTO: 0.02 K/UL
BASOPHILS NFR BLD: 0.2 %
BILIRUB DIRECT SERPL-MCNC: 0.2 MG/DL
BILIRUB SERPL-MCNC: 0.4 MG/DL
DIFFERENTIAL METHOD: ABNORMAL
EOSINOPHIL # BLD AUTO: 0 K/UL
EOSINOPHIL NFR BLD: 0.1 %
ERYTHROCYTE [DISTWIDTH] IN BLOOD BY AUTOMATED COUNT: 13.2 %
HCT VFR BLD AUTO: 44.1 %
HGB BLD-MCNC: 13.7 G/DL
IMM GRANULOCYTES # BLD AUTO: 0.06 K/UL
IMM GRANULOCYTES NFR BLD AUTO: 0.7 %
LYMPHOCYTES # BLD AUTO: 1 K/UL
LYMPHOCYTES NFR BLD: 11.3 %
MCH RBC QN AUTO: 27.8 PG
MCHC RBC AUTO-ENTMCNC: 31.1 G/DL
MCV RBC AUTO: 90 FL
MONOCYTES # BLD AUTO: 0.3 K/UL
MONOCYTES NFR BLD: 3.2 %
NEUTROPHILS # BLD AUTO: 7.3 K/UL
NEUTROPHILS NFR BLD: 84.5 %
NRBC BLD-RTO: 0 /100 WBC
PLATELET # BLD AUTO: 297 K/UL
PMV BLD AUTO: 9.7 FL
PROT SERPL-MCNC: 7.1 G/DL
RBC # BLD AUTO: 4.93 M/UL
WBC # BLD AUTO: 8.64 K/UL

## 2018-04-03 PROCEDURE — 85025 COMPLETE CBC W/AUTO DIFF WBC: CPT

## 2018-04-03 PROCEDURE — 36415 COLL VENOUS BLD VENIPUNCTURE: CPT

## 2018-04-03 PROCEDURE — 80076 HEPATIC FUNCTION PANEL: CPT

## 2018-04-03 NOTE — TELEPHONE ENCOUNTER
Spoke to patient re stool calprotectin results and symptoms.  Result still elevated at 889 but improved.  She is having 4-7 loose - formed BMs/day with no blood and some mucous.  Her biggest complaint is a lot of gas and mucous with some abdominal cramping with BMs and extreme hunger from the prednsione.  She is on Prednisone 30 mg (tapers to 20 mg 4/3/2018), Imuran 50 mg, Apriso 1.5 mg, and Vancomycin 125 mg QID.  Discussed in detail symptoms with Dr. Walsh prior to calling patient and we will treat patient conservatively and give Imuran a little longer to work since her symptoms have improved, her calprotectin is better, CRP is normal, normal H&H, and normal albumin.  Discussed this in detail with patient who agrees.  We will cancel her remicade infusion that is scheduled for next week.  For her symptoms, we will try Imodium either 2 tabs in the AM before her AM BMs or 1 tab QHS and 1 tab QAM and she was instructed to try OTC Gas-X up to TID PRN for gas.  We will have a phone conversation next Tuesday afternoon (4/10/2018) to further discuss prednisone taper and vancomycin taper, for now she will stay on the above doses and make no further changes.  Once her BMs become less frequent, we will also likely restart the canasa QHS.  If her symptoms progress or imuran proves to be ineffective alone, we will likely proceed with remicade.  Patient verbalizes understanding and agrees with the treatment plan.  Questions answered.     KG

## 2018-04-10 ENCOUNTER — PATIENT MESSAGE (OUTPATIENT)
Dept: GASTROENTEROLOGY | Facility: CLINIC | Age: 53
End: 2018-04-10

## 2018-04-12 DIAGNOSIS — K51.011 ULCERATIVE PANCOLITIS WITH RECTAL BLEEDING: Primary | ICD-10-CM

## 2018-04-12 RX ORDER — AZATHIOPRINE 50 MG/1
50 TABLET ORAL DAILY
Qty: 30 TABLET | Refills: 0 | Status: SHIPPED | OUTPATIENT
Start: 2018-04-12 | End: 2018-05-11 | Stop reason: SDUPTHER

## 2018-04-13 ENCOUNTER — TELEPHONE (OUTPATIENT)
Dept: GASTROENTEROLOGY | Facility: CLINIC | Age: 53
End: 2018-04-13

## 2018-04-13 NOTE — TELEPHONE ENCOUNTER
Called and spoke with Mary in [facility]   I explained we received a refill request that was taken care of yesterday for pts Marisol  She stated she see's it in the system and will get it filled

## 2018-04-16 ENCOUNTER — PATIENT MESSAGE (OUTPATIENT)
Dept: GASTROENTEROLOGY | Facility: CLINIC | Age: 53
End: 2018-04-16

## 2018-04-19 ENCOUNTER — OFFICE VISIT (OUTPATIENT)
Dept: GASTROENTEROLOGY | Facility: CLINIC | Age: 53
End: 2018-04-19
Payer: COMMERCIAL

## 2018-04-19 ENCOUNTER — PATIENT MESSAGE (OUTPATIENT)
Dept: GASTROENTEROLOGY | Facility: CLINIC | Age: 53
End: 2018-04-19

## 2018-04-19 VITALS
HEIGHT: 62 IN | WEIGHT: 117.94 LBS | HEART RATE: 74 BPM | TEMPERATURE: 98 F | RESPIRATION RATE: 16 BRPM | DIASTOLIC BLOOD PRESSURE: 86 MMHG | SYSTOLIC BLOOD PRESSURE: 123 MMHG | BODY MASS INDEX: 21.7 KG/M2

## 2018-04-19 DIAGNOSIS — Z91.89 HIGH RISK FOR COLON CANCER: ICD-10-CM

## 2018-04-19 DIAGNOSIS — I10 ESSENTIAL HYPERTENSION: ICD-10-CM

## 2018-04-19 DIAGNOSIS — G89.29 CHRONIC BILATERAL LOW BACK PAIN WITHOUT SCIATICA: ICD-10-CM

## 2018-04-19 DIAGNOSIS — F41.9 ANXIETY: ICD-10-CM

## 2018-04-19 DIAGNOSIS — M54.50 CHRONIC BILATERAL LOW BACK PAIN WITHOUT SCIATICA: ICD-10-CM

## 2018-04-19 DIAGNOSIS — Z86.19 HISTORY OF CLOSTRIDIUM DIFFICILE COLITIS: ICD-10-CM

## 2018-04-19 DIAGNOSIS — Z79.899 HIGH RISK MEDICATION USE: ICD-10-CM

## 2018-04-19 DIAGNOSIS — K51.011 ULCERATIVE PANCOLITIS WITH RECTAL BLEEDING: ICD-10-CM

## 2018-04-19 DIAGNOSIS — R19.7 DIARRHEA, UNSPECIFIED TYPE: Primary | ICD-10-CM

## 2018-04-19 PROCEDURE — 99214 OFFICE O/P EST MOD 30 MIN: CPT | Mod: S$GLB,,, | Performed by: NURSE PRACTITIONER

## 2018-04-19 PROCEDURE — 99999 PR PBB SHADOW E&M-EST. PATIENT-LVL IV: CPT | Mod: PBBFAC,,, | Performed by: NURSE PRACTITIONER

## 2018-04-19 RX ORDER — PREDNISONE 5 MG/1
5 TABLET ORAL DAILY
COMMUNITY
End: 2018-07-25 | Stop reason: ALTCHOICE
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
## 2018-04-19 NOTE — PATIENT INSTRUCTIONS
Instructions:  - labs this weeks CBC/LFTs  - continue prednisone taper  - continue imuran 50 mg daily  - continue Apriso 1.5 gm/daily  - restart canasa nightly  - taper oral vancomycin to 125 mg four times daily every other day for a week, every 2 days for a week, every 3 days for a week, every 4 days for a week, every 5 days for a week, every 6 days for a week, then every 7 days for a week then stop  - Avoid all NSAIDs (Advil, Ibuprofen, Motrin, Aspirin, Naprosyn, Aleve)  - Yearly Pap Smears recommended--due 10/2018  - Yearly Eye exam--due now  - Yearly Skin exam--wear sun block and hats--due now  - Use antibiotics with caution  - Vaccines-ID referral, need to schedule  - Follow up with MARK Nichols with Dr. Walsh in clinic in 3 months

## 2018-04-20 PROBLEM — Z86.19 HISTORY OF CLOSTRIDIUM DIFFICILE COLITIS: Status: ACTIVE | Noted: 2018-04-20

## 2018-04-20 PROBLEM — G89.29 CHRONIC BILATERAL LOW BACK PAIN WITHOUT SCIATICA: Status: ACTIVE | Noted: 2018-04-20

## 2018-04-20 PROBLEM — M54.50 CHRONIC BILATERAL LOW BACK PAIN WITHOUT SCIATICA: Status: ACTIVE | Noted: 2018-04-20

## 2018-04-20 PROBLEM — A04.71 RECURRENT COLITIS DUE TO CLOSTRIDIUM DIFFICILE: Status: RESOLVED | Noted: 2018-03-13 | Resolved: 2018-04-20

## 2018-05-08 ENCOUNTER — TELEPHONE (OUTPATIENT)
Dept: GASTROENTEROLOGY | Facility: CLINIC | Age: 53
End: 2018-05-08

## 2018-05-08 ENCOUNTER — PATIENT MESSAGE (OUTPATIENT)
Dept: GASTROENTEROLOGY | Facility: CLINIC | Age: 53
End: 2018-05-08

## 2018-05-08 DIAGNOSIS — I10 ESSENTIAL HYPERTENSION: Primary | ICD-10-CM

## 2018-05-08 DIAGNOSIS — Z91.89 AT RISK FOR OSTEOPENIA: Primary | ICD-10-CM

## 2018-05-08 RX ORDER — AMLODIPINE BESYLATE 5 MG/1
5 TABLET ORAL DAILY
Qty: 30 TABLET | Refills: 0 | Status: CANCELLED | OUTPATIENT
Start: 2018-05-08

## 2018-05-08 NOTE — TELEPHONE ENCOUNTER
Attempted to call pt to assess if she is off Prednisone, if so, she needs a DEXA scan. May be done at [facility] facility closer to home.     Will also need to change 7/24/2018 clinic appointment esthela [facility](only has AM pt's on 7/24/2018)  to another day when SS is also in clinic

## 2018-05-09 ENCOUNTER — PATIENT MESSAGE (OUTPATIENT)
Dept: GASTROENTEROLOGY | Facility: CLINIC | Age: 53
End: 2018-05-09

## 2018-05-10 ENCOUNTER — PATIENT MESSAGE (OUTPATIENT)
Dept: GASTROENTEROLOGY | Facility: CLINIC | Age: 53
End: 2018-05-10

## 2018-05-11 ENCOUNTER — PATIENT MESSAGE (OUTPATIENT)
Dept: GASTROENTEROLOGY | Facility: CLINIC | Age: 53
End: 2018-05-11

## 2018-05-11 DIAGNOSIS — K51.011 ULCERATIVE PANCOLITIS WITH RECTAL BLEEDING: ICD-10-CM

## 2018-05-11 DIAGNOSIS — K51.011 ULCERATIVE PANCOLITIS WITH RECTAL BLEEDING: Primary | ICD-10-CM

## 2018-05-11 RX ORDER — AZATHIOPRINE 50 MG/1
50 TABLET ORAL DAILY
Qty: 30 TABLET | Refills: 2 | Status: SHIPPED | OUTPATIENT
Start: 2018-05-11 | End: 2018-08-15 | Stop reason: SDUPTHER

## 2018-05-11 RX ORDER — AZATHIOPRINE 50 MG/1
50 TABLET ORAL DAILY
Qty: 30 TABLET | Refills: 0 | Status: CANCELLED | OUTPATIENT
Start: 2018-05-11 | End: 2019-05-11

## 2018-05-14 ENCOUNTER — PATIENT MESSAGE (OUTPATIENT)
Dept: GASTROENTEROLOGY | Facility: CLINIC | Age: 53
End: 2018-05-14

## 2018-05-15 ENCOUNTER — PATIENT MESSAGE (OUTPATIENT)
Dept: GASTROENTEROLOGY | Facility: CLINIC | Age: 53
End: 2018-05-15

## 2018-05-29 ENCOUNTER — PATIENT MESSAGE (OUTPATIENT)
Dept: GASTROENTEROLOGY | Facility: CLINIC | Age: 53
End: 2018-05-29

## 2018-05-29 DIAGNOSIS — K51.011 ULCERATIVE PANCOLITIS WITH RECTAL BLEEDING: Primary | ICD-10-CM

## 2018-06-01 ENCOUNTER — TELEPHONE (OUTPATIENT)
Dept: GASTROENTEROLOGY | Facility: CLINIC | Age: 53
End: 2018-06-01

## 2018-06-01 NOTE — TELEPHONE ENCOUNTER
Called and spoke with pt  Got her follow up rescheduled from 07/24 to 07/25    While we were on the line she stated she is not sure if the Apriso is working.  She is feeling great but she is having 4 formed BM's a day and she only goes in the morning.  She feels like 4 is still a lot and she is not sure why it is only in the morning  No blood, nocturnal, fever, or pain  I explained I would send message over and we will be in touch  She expressed understanding

## 2018-06-01 NOTE — TELEPHONE ENCOUNTER
Called and spoke with pt  I explained that the number is not as concerning as the consistency.  I explained once she turns in her stool Calprotectin and we get the results we will have a better idea if it is working.  She asked when to turn in and I said mid June but Magdalena stated she can turn it in next week if she would like  She expressed understanding and I told her we will be in touch with further instruction once we receive stool results

## 2018-06-08 ENCOUNTER — LAB VISIT (OUTPATIENT)
Dept: LAB | Facility: HOSPITAL | Age: 53
End: 2018-06-08
Payer: COMMERCIAL

## 2018-06-08 DIAGNOSIS — K51.011 ULCERATIVE PANCOLITIS WITH RECTAL BLEEDING: ICD-10-CM

## 2018-06-08 PROCEDURE — 83993 ASSAY FOR CALPROTECTIN FECAL: CPT

## 2018-06-11 ENCOUNTER — PATIENT MESSAGE (OUTPATIENT)
Dept: GASTROENTEROLOGY | Facility: CLINIC | Age: 53
End: 2018-06-11

## 2018-06-15 LAB — CALPROTECTIN STL-MCNT: 51.4 MCG/G

## 2018-06-18 ENCOUNTER — PATIENT MESSAGE (OUTPATIENT)
Dept: GASTROENTEROLOGY | Facility: CLINIC | Age: 53
End: 2018-06-18

## 2018-06-20 ENCOUNTER — PATIENT MESSAGE (OUTPATIENT)
Dept: GASTROENTEROLOGY | Facility: CLINIC | Age: 53
End: 2018-06-20

## 2018-07-04 ENCOUNTER — PATIENT MESSAGE (OUTPATIENT)
Dept: GASTROENTEROLOGY | Facility: CLINIC | Age: 53
End: 2018-07-04

## 2018-07-05 ENCOUNTER — TELEPHONE (OUTPATIENT)
Dept: GASTROENTEROLOGY | Facility: CLINIC | Age: 53
End: 2018-07-05

## 2018-07-05 ENCOUNTER — PATIENT MESSAGE (OUTPATIENT)
Dept: GASTROENTEROLOGY | Facility: CLINIC | Age: 53
End: 2018-07-05

## 2018-07-05 NOTE — TELEPHONE ENCOUNTER
Called and spoke with pt  She wanted to know if her joint pain was associated with her Imuran or Apriso  I told her I will go speak with Dr Walsh and call back    Called pt back after speaking with Dr Walsh  I explained dr Walsh is not convinced that this is her Imuran.  If it were the Imuran the symptoms would have been more immediate Apriso does not cause joint pain.    I told her if she is still having issues when she comes in on 07/25 we can discuss it again  She expressed understanding and thanked me for calling

## 2018-07-05 NOTE — TELEPHONE ENCOUNTER
----- Message from Lena Velasco MA sent at 7/5/2018  1:38 PM CDT -----  Contact: self  [phone]  Needs Advice    Reason for call:    Please call me to verify one thing that was not answered on the portal  Communication Preference:  Phone# above  Additional Information:  na
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
## 2018-07-25 ENCOUNTER — OFFICE VISIT (OUTPATIENT)
Dept: GASTROENTEROLOGY | Facility: CLINIC | Age: 53
End: 2018-07-25
Payer: COMMERCIAL

## 2018-07-25 VITALS
BODY MASS INDEX: 21.91 KG/M2 | HEART RATE: 60 BPM | SYSTOLIC BLOOD PRESSURE: 140 MMHG | WEIGHT: 119.06 LBS | HEIGHT: 62 IN | DIASTOLIC BLOOD PRESSURE: 83 MMHG | TEMPERATURE: 98 F

## 2018-07-25 DIAGNOSIS — F41.9 ANXIETY: ICD-10-CM

## 2018-07-25 DIAGNOSIS — I10 ESSENTIAL HYPERTENSION: ICD-10-CM

## 2018-07-25 DIAGNOSIS — G89.29 CHRONIC BILATERAL LOW BACK PAIN WITHOUT SCIATICA: ICD-10-CM

## 2018-07-25 DIAGNOSIS — M54.50 CHRONIC BILATERAL LOW BACK PAIN WITHOUT SCIATICA: ICD-10-CM

## 2018-07-25 DIAGNOSIS — Z79.899 HIGH RISK MEDICATION USE: ICD-10-CM

## 2018-07-25 DIAGNOSIS — K51.011 ULCERATIVE PANCOLITIS WITH RECTAL BLEEDING: Primary | ICD-10-CM

## 2018-07-25 DIAGNOSIS — Z91.89 HIGH RISK FOR COLON CANCER: ICD-10-CM

## 2018-07-25 DIAGNOSIS — Z86.19 HISTORY OF CLOSTRIDIUM DIFFICILE COLITIS: ICD-10-CM

## 2018-07-25 PROBLEM — R19.7 DIARRHEA: Status: RESOLVED | Noted: 2018-02-12 | Resolved: 2018-07-25

## 2018-07-25 PROCEDURE — 99999 PR PBB SHADOW E&M-EST. PATIENT-LVL IV: CPT | Mod: PBBFAC,,, | Performed by: NURSE PRACTITIONER

## 2018-07-25 PROCEDURE — 99215 OFFICE O/P EST HI 40 MIN: CPT | Mod: S$GLB,,, | Performed by: NURSE PRACTITIONER

## 2018-07-25 RX ORDER — MESALAMINE 1000 MG/1
1000 SUPPOSITORY RECTAL NIGHTLY
Qty: 90 SUPPOSITORY | Refills: 2 | Status: SHIPPED | OUTPATIENT
Start: 2018-07-25 | End: 2018-08-24

## 2018-07-25 NOTE — PATIENT INSTRUCTIONS
Instructions:  - labs this week (CBC, LFTs)  - continue Imuran 50 mg PO day  - continue apriso 1.5 gm/daily  - restart canasa nightly for any return of symptoms--new RX sent of symptoms  - colonoscopy in 8/2018  - Avoid all NSAIDs (Advil, Ibuprofen, Motrin, Aspirin, Naprosyn, Aleve)  - Yearly Pap Smears recommended   - Yearly Eye exam recommended  - Yearly Skin exam--wear sun block and hats recommended  - Use antibiotics with caution  - Vaccines recommended  Flu shot yearly  Tetanus every 10 years  Hepatitis A series   Pneumonia 13  (PCV13) vaccine initial  Pneumonia 23 (PPSV23) vaccine 1 year after PCV13, then an additional dose in 5 years, then again at age 65  Shingrix series recommended  - Follow up with MARK Nichols in 6 months

## 2018-08-15 DIAGNOSIS — K51.011 ULCERATIVE PANCOLITIS WITH RECTAL BLEEDING: Primary | ICD-10-CM

## 2018-08-15 RX ORDER — AZATHIOPRINE 50 MG/1
50 TABLET ORAL DAILY
Qty: 30 TABLET | Refills: 2 | Status: SHIPPED | OUTPATIENT
Start: 2018-08-15 | End: 2018-10-15 | Stop reason: SDUPTHER

## 2018-08-20 ENCOUNTER — TELEPHONE (OUTPATIENT)
Dept: GASTROENTEROLOGY | Facility: CLINIC | Age: 53
End: 2018-08-20

## 2018-08-20 NOTE — TELEPHONE ENCOUNTER
Called and spoke with pt  She stated she has been having an URI for a few days and she spoke with her PCP and she called in some Cipro.  No fever and no issues with her UC   She wanted to check with us to make sure the Cipro was OK to take

## 2018-08-20 NOTE — TELEPHONE ENCOUNTER
Please let patient know to be sure that she absolutely needs cipro and that there is a bacterial infection and that this is not just a virus. Let her primary care know that she has had C diff before and antibiotics need to be absolutely necessary due to the risk of recurrent C diff and that she has ulcerative colitis.

## 2018-08-20 NOTE — TELEPHONE ENCOUNTER
Called pt, no answer, LM explaining message below in detail and I left my direct line for her to return call with any questions.

## 2018-08-20 NOTE — TELEPHONE ENCOUNTER
----- Message from Susan Nieto sent at 8/20/2018  8:36 AM CDT -----  Contact: self   rena - has sinus infection - md gave her cipro - can she take that with u/c - please call patient at

## 2018-09-10 ENCOUNTER — TELEPHONE (OUTPATIENT)
Dept: GASTROENTEROLOGY | Facility: CLINIC | Age: 53
End: 2018-09-10

## 2018-09-10 NOTE — TELEPHONE ENCOUNTER
Called and spoke with pt  She stated her kids have been sick and she has been running back and forth to the Drs and then her daughter's TMJ started acting up badly and she has been back and forth to Drs and imaging appointments with her and she said as soon as she see's the specialist in [place] and gets her jaw surgery scheduled she will make sure to schedule her scope.  She is feeling good

## 2018-09-10 NOTE — TELEPHONE ENCOUNTER
----- Message from Susan Nieto sent at 9/10/2018  3:55 PM CDT -----  Contact: self - [phone]  Jillian - calling re her scope to schedule - please call patient at

## 2018-10-01 ENCOUNTER — TELEPHONE (OUTPATIENT)
Dept: GASTROENTEROLOGY | Facility: CLINIC | Age: 53
End: 2018-10-01

## 2018-10-01 ENCOUNTER — PATIENT MESSAGE (OUTPATIENT)
Dept: GASTROENTEROLOGY | Facility: CLINIC | Age: 53
End: 2018-10-01

## 2018-10-01 NOTE — TELEPHONE ENCOUNTER
----- Message from Flori Ricky sent at 10/1/2018  3:23 PM CDT -----  Contact: Self- [phone]  Jillian- pt called with questions about an antibitoic she can take- being prescribed by her dermatologist and wanted to make sure it was ok pt has Perioral dermatitis - the rx is doxycyclone- please contact pt at [phone]

## 2018-10-14 ENCOUNTER — PATIENT MESSAGE (OUTPATIENT)
Dept: GASTROENTEROLOGY | Facility: CLINIC | Age: 53
End: 2018-10-14

## 2018-10-14 DIAGNOSIS — K51.011 ULCERATIVE PANCOLITIS WITH RECTAL BLEEDING: ICD-10-CM

## 2018-10-15 ENCOUNTER — PATIENT MESSAGE (OUTPATIENT)
Dept: GASTROENTEROLOGY | Facility: CLINIC | Age: 53
End: 2018-10-15

## 2018-10-15 RX ORDER — AZATHIOPRINE 50 MG/1
50 TABLET ORAL DAILY
Qty: 90 TABLET | Refills: 0 | Status: SHIPPED | OUTPATIENT
Start: 2018-10-15 | End: 2018-10-22 | Stop reason: SDUPTHER

## 2018-10-15 NOTE — TELEPHONE ENCOUNTER
Pt requesting a 3 mth supply for Imuran  Allergies reviewed   Last Labs   07/25/2018    Next appoint   01/28/2019

## 2018-10-16 ENCOUNTER — PATIENT MESSAGE (OUTPATIENT)
Dept: GASTROENTEROLOGY | Facility: CLINIC | Age: 53
End: 2018-10-16

## 2018-10-16 ENCOUNTER — LAB VISIT (OUTPATIENT)
Dept: LAB | Facility: HOSPITAL | Age: 53
End: 2018-10-16
Payer: COMMERCIAL

## 2018-10-16 DIAGNOSIS — Z79.899 HIGH RISK MEDICATION USE: ICD-10-CM

## 2018-10-16 DIAGNOSIS — K51.011 ULCERATIVE PANCOLITIS WITH RECTAL BLEEDING: ICD-10-CM

## 2018-10-16 LAB
ALBUMIN SERPL BCP-MCNC: 3.9 G/DL
ALP SERPL-CCNC: 70 U/L
ALT SERPL W/O P-5'-P-CCNC: 9 U/L
AST SERPL-CCNC: 15 U/L
BASOPHILS # BLD AUTO: 0.01 K/UL
BASOPHILS NFR BLD: 0.2 %
BILIRUB DIRECT SERPL-MCNC: 0.2 MG/DL
BILIRUB SERPL-MCNC: 0.6 MG/DL
DIFFERENTIAL METHOD: NORMAL
EOSINOPHIL # BLD AUTO: 0.1 K/UL
EOSINOPHIL NFR BLD: 1.7 %
ERYTHROCYTE [DISTWIDTH] IN BLOOD BY AUTOMATED COUNT: 13.1 %
HCT VFR BLD AUTO: 39.3 %
HGB BLD-MCNC: 12.9 G/DL
IMM GRANULOCYTES # BLD AUTO: 0.01 K/UL
IMM GRANULOCYTES NFR BLD AUTO: 0.2 %
LYMPHOCYTES # BLD AUTO: 1.5 K/UL
LYMPHOCYTES NFR BLD: 31.9 %
MCH RBC QN AUTO: 29.9 PG
MCHC RBC AUTO-ENTMCNC: 32.8 G/DL
MCV RBC AUTO: 91 FL
MONOCYTES # BLD AUTO: 0.5 K/UL
MONOCYTES NFR BLD: 9.9 %
NEUTROPHILS # BLD AUTO: 2.7 K/UL
NEUTROPHILS NFR BLD: 56.1 %
NRBC BLD-RTO: 0 /100 WBC
PLATELET # BLD AUTO: 219 K/UL
PMV BLD AUTO: 10.5 FL
PROT SERPL-MCNC: 7.1 G/DL
RBC # BLD AUTO: 4.32 M/UL
WBC # BLD AUTO: 4.77 K/UL

## 2018-10-16 PROCEDURE — 36415 COLL VENOUS BLD VENIPUNCTURE: CPT

## 2018-10-16 PROCEDURE — 80076 HEPATIC FUNCTION PANEL: CPT

## 2018-10-16 PROCEDURE — 85025 COMPLETE CBC W/AUTO DIFF WBC: CPT

## 2018-10-17 ENCOUNTER — PATIENT MESSAGE (OUTPATIENT)
Dept: GASTROENTEROLOGY | Facility: CLINIC | Age: 53
End: 2018-10-17

## 2018-10-18 ENCOUNTER — PATIENT MESSAGE (OUTPATIENT)
Dept: GASTROENTEROLOGY | Facility: CLINIC | Age: 53
End: 2018-10-18

## 2018-10-18 DIAGNOSIS — K64.9 HEMORRHOIDS, UNSPECIFIED HEMORRHOID TYPE: Primary | ICD-10-CM

## 2018-10-18 RX ORDER — HYDROCORTISONE 25 MG/G
CREAM TOPICAL 2 TIMES DAILY
Qty: 28 G | Refills: 0 | Status: SHIPPED | OUTPATIENT
Start: 2018-10-18 | End: 2019-01-28 | Stop reason: ALTCHOICE

## 2018-10-21 ENCOUNTER — PATIENT MESSAGE (OUTPATIENT)
Dept: GASTROENTEROLOGY | Facility: CLINIC | Age: 53
End: 2018-10-21

## 2018-10-22 ENCOUNTER — PATIENT MESSAGE (OUTPATIENT)
Dept: GASTROENTEROLOGY | Facility: CLINIC | Age: 53
End: 2018-10-22

## 2018-10-22 DIAGNOSIS — K51.011 ULCERATIVE PANCOLITIS WITH RECTAL BLEEDING: ICD-10-CM

## 2018-10-22 RX ORDER — AZATHIOPRINE 50 MG/1
50 TABLET ORAL DAILY
Qty: 30 TABLET | Refills: 2 | Status: SHIPPED | OUTPATIENT
Start: 2018-10-22 | End: 2018-11-16 | Stop reason: SDUPTHER

## 2018-10-22 NOTE — TELEPHONE ENCOUNTER
[facility] does not dispense this medication so I pended it for [facility]     Pt requesting refill for Imuran  Allergies reviewed   Last Labs   10/16/2018     Next appoint   01/28/2019

## 2018-10-22 NOTE — TELEPHONE ENCOUNTER
Called [facility] and spoke with Brooke  He stated they do not dispense this medication.     Called pt, no answer, lm explaining above message and asked if she wanted a 1 mth supply or 3 mth supply sent to [facility].  Will await a response

## 2018-11-06 ENCOUNTER — ANESTHESIA (OUTPATIENT)
Dept: ENDOSCOPY | Facility: HOSPITAL | Age: 53
End: 2018-11-06
Payer: COMMERCIAL

## 2018-11-06 ENCOUNTER — HOSPITAL ENCOUNTER (OUTPATIENT)
Facility: HOSPITAL | Age: 53
Discharge: HOME | End: 2018-11-06
Attending: INTERNAL MEDICINE | Admitting: INTERNAL MEDICINE
Payer: COMMERCIAL

## 2018-11-06 ENCOUNTER — ANESTHESIA EVENT (OUTPATIENT)
Dept: ENDOSCOPY | Facility: HOSPITAL | Age: 53
End: 2018-11-06
Payer: COMMERCIAL

## 2018-11-06 VITALS
HEART RATE: 60 BPM | WEIGHT: 117 LBS | TEMPERATURE: 98 F | OXYGEN SATURATION: 100 % | HEIGHT: 62 IN | RESPIRATION RATE: 16 BRPM | DIASTOLIC BLOOD PRESSURE: 71 MMHG | BODY MASS INDEX: 21.53 KG/M2 | SYSTOLIC BLOOD PRESSURE: 149 MMHG

## 2018-11-06 DIAGNOSIS — K51.011 ULCERATIVE PANCOLITIS WITH RECTAL BLEEDING: Primary | ICD-10-CM

## 2018-11-06 DIAGNOSIS — K51.00 ULCERATIVE PANCOLITIS: ICD-10-CM

## 2018-11-06 PROCEDURE — 25000003 PHARM REV CODE 250: Performed by: NURSE ANESTHETIST, CERTIFIED REGISTERED

## 2018-11-06 PROCEDURE — 25000003 PHARM REV CODE 250: Performed by: INTERNAL MEDICINE

## 2018-11-06 PROCEDURE — E9220 PRA ENDO ANESTHESIA: HCPCS | Mod: ,,, | Performed by: NURSE ANESTHETIST, CERTIFIED REGISTERED

## 2018-11-06 PROCEDURE — 27201012 HC FORCEPS, HOT/COLD, DISP: Performed by: INTERNAL MEDICINE

## 2018-11-06 PROCEDURE — 63600175 PHARM REV CODE 636 W HCPCS: Performed by: NURSE ANESTHETIST, CERTIFIED REGISTERED

## 2018-11-06 PROCEDURE — 88305 TISSUE EXAM BY PATHOLOGIST: CPT | Performed by: PATHOLOGY

## 2018-11-06 PROCEDURE — 37000008 HC ANESTHESIA 1ST 15 MINUTES: Performed by: INTERNAL MEDICINE

## 2018-11-06 PROCEDURE — 45380 COLONOSCOPY AND BIOPSY: CPT | Mod: ,,, | Performed by: INTERNAL MEDICINE

## 2018-11-06 PROCEDURE — 88305 TISSUE EXAM BY PATHOLOGIST: CPT | Mod: 26,,, | Performed by: PATHOLOGY

## 2018-11-06 PROCEDURE — 45380 COLONOSCOPY AND BIOPSY: CPT | Performed by: INTERNAL MEDICINE

## 2018-11-06 PROCEDURE — 37000009 HC ANESTHESIA EA ADD 15 MINS: Performed by: INTERNAL MEDICINE

## 2018-11-06 RX ORDER — PROPOFOL 10 MG/ML
VIAL (ML) INTRAVENOUS CONTINUOUS PRN
Status: DISCONTINUED | OUTPATIENT
Start: 2018-11-06 | End: 2018-11-06

## 2018-11-06 RX ORDER — SODIUM CHLORIDE 0.9 % (FLUSH) 0.9 %
3 SYRINGE (ML) INJECTION
Status: DISCONTINUED | OUTPATIENT
Start: 2018-11-06 | End: 2018-11-06 | Stop reason: HOSPADM

## 2018-11-06 RX ORDER — GLYCOPYRROLATE 0.2 MG/ML
INJECTION INTRAMUSCULAR; INTRAVENOUS
Status: DISCONTINUED | OUTPATIENT
Start: 2018-11-06 | End: 2018-11-06

## 2018-11-06 RX ORDER — SODIUM CHLORIDE 9 MG/ML
INJECTION, SOLUTION INTRAVENOUS CONTINUOUS
Status: DISCONTINUED | OUTPATIENT
Start: 2018-11-06 | End: 2018-11-06 | Stop reason: HOSPADM

## 2018-11-06 RX ORDER — LIDOCAINE HCL/PF 100 MG/5ML
SYRINGE (ML) INTRAVENOUS
Status: DISCONTINUED | OUTPATIENT
Start: 2018-11-06 | End: 2018-11-06

## 2018-11-06 RX ORDER — PROPOFOL 10 MG/ML
VIAL (ML) INTRAVENOUS
Status: DISCONTINUED | OUTPATIENT
Start: 2018-11-06 | End: 2018-11-06

## 2018-11-06 RX ADMIN — PROPOFOL 100 MG: 10 INJECTION, EMULSION INTRAVENOUS at 09:11

## 2018-11-06 RX ADMIN — LIDOCAINE HYDROCHLORIDE 20 MG: 20 INJECTION, SOLUTION INTRAVENOUS at 09:11

## 2018-11-06 RX ADMIN — SODIUM CHLORIDE: 0.9 INJECTION, SOLUTION INTRAVENOUS at 09:11

## 2018-11-06 RX ADMIN — PROPOFOL 200 MCG/KG/MIN: 10 INJECTION, EMULSION INTRAVENOUS at 09:11

## 2018-11-06 RX ADMIN — GLYCOPYRROLATE 0.1 MG: 0.2 INJECTION, SOLUTION INTRAMUSCULAR; INTRAVENOUS at 09:11

## 2018-11-06 NOTE — DISCHARGE INSTRUCTIONS

## 2018-11-06 NOTE — ANESTHESIA PREPROCEDURE EVALUATION
11/06/2018  Ansley Bautista is a 53 y.o., female.   Past Medical History:   Diagnosis Date    Colon polyp      Past Surgical History:   Procedure Laterality Date    bilateral knee scopes      breast augumentation      COLONOSCOPY      cyst removal off of breast      PARTIAL HYSTERECTOMY      SIGMOIDOSCOPY-FLEXIBLE N/A 3/14/2018    Performed by Mike Walsh MD at [facility] ([facility]TH FLR)    SIGMOIDOSCOPY-FLEXIBLE N/A 11/10/2017    Performed by [facility] MD at [facility] ([facility]TH FLR)    SIGMOIDOSCOPY-FLEXIBLE N/A 8/8/2017    Performed by Mike Walsh MD at [facility] (4TH FLR)    surgery on both feet           Anesthesia Evaluation    I have reviewed the Patient Summary Reports.     I have reviewed the Medications.     Review of Systems  Anesthesia Hx:  No problems with previous Anesthesia  Neg history of prior surgery. Denies Family Hx of Anesthesia complications.   Denies Personal Hx of Anesthesia complications.   Hematology/Oncology:  Hematology Normal   Oncology Normal     EENT/Dental:EENT/Dental Normal   Cardiovascular:   Exercise tolerance: good Hypertension    Pulmonary:  Pulmonary Normal    Renal/:  Renal/ Normal     Hepatic/GI:  Hepatic/GI Normal    Musculoskeletal:  Musculoskeletal Normal    Neurological:  Neurology Normal    Endocrine:  Endocrine Normal    Dermatological:  Skin Normal    Psych:  Psychiatric Normal           Physical Exam  General:  Well nourished    Airway/Jaw/Neck:       Eyes/Ears/Nose:  EYES/EARS/NOSE FINDINGS: Normal    Chest/Lungs:  Chest/Lungs Clear    Heart/Vascular:  Heart Findings: Normal Heart murmur: negative Vascular Findings: Normal    Abdomen:  Abdomen Findings: Normal    Musculoskeletal:  Musculoskeletal Findings: Normal   Skin:  Skin Findings: Normal    Mental Status:  Mental Status Findings:  Cooperative, Alert and Oriented         Anesthesia  Plan  Type of Anesthesia, risks & benefits discussed:  Anesthesia Type:  general  Patient's Preference: general  Intra-op Monitoring Plan: standard ASA monitors  Intra-op Monitoring Plan Comments:   Post Op Pain Control Plan: multimodal analgesia  Post Op Pain Control Plan Comments:   Induction:   IV  Beta Blocker:  Patient is not currently on a Beta-Blocker (No further documentation required).       Informed Consent: Patient understands risks and agrees with Anesthesia plan.  Questions answered. Anesthesia consent signed with patient.  ASA Score: 2     Day of Surgery Review of History & Physical: I have interviewed and examined the patient. I have reviewed the patient's H&P dated:  There are no significant changes.  H&P update referred to the surgeon.         Ready For Surgery From Anesthesia Perspective.

## 2018-11-06 NOTE — H&P
Short Stay Endoscopy History and Physical    PCP - Primary Doctor No  Referring Physician - Magdalena Morales, FNP  [address] [place] [address]    Procedure - Colonoscopy  ASA - per anesthesia  Mallampati - per anesthesia  History of Anesthesia problems - no  Family history Anesthesia problems -  no   Plan of anesthesia - General    HPI  53 y.o. female  Reason for procedure:   Ulcerative colitis f/u      ROS:  Constitutional: No fevers, chills, No weight loss  CV: No chest pain  Pulm: No cough, No shortness of breath  GI: see HPI    Medical History:  has a past medical history of Colon polyp.    Surgical History:  has a past surgical history that includes cyst removal off of breast; Colonoscopy; breast augumentation; Partial hysterectomy; bilateral knee scopes; surgery on both feet; SIGMOIDOSCOPY-FLEXIBLE (N/A, 3/14/2018); SIGMOIDOSCOPY-FLEXIBLE (N/A, 11/10/2017); and SIGMOIDOSCOPY-FLEXIBLE (N/A, 8/8/2017).    Family History: family history includes Diabetes in her father; Diverticulitis in her mother; Heart disease in her father; Hypertension in her brother, father, and sister.. Otherwise no colon cancer, inflammatory bowel disease, or GI malignancies.    Social History:  reports that she has quit smoking. she has never used smokeless tobacco. She reports that she drinks about 0.6 - 1.2 oz of alcohol per week. She reports that she does not use drugs.    Review of patient's allergies indicates:   Allergen Reactions    Pcn [penicillins] Anaphylaxis    Zithromax [azithromycin] Rash       Medications:   Medications Prior to Admission   Medication Sig Dispense Refill Last Dose    amLODIPine (NORVASC) 5 MG tablet Take 5 mg by mouth once daily.   11/6/2018 at Unknown time    azaTHIOprine (IMURAN) 50 mg Tab Take 1 tablet (50 mg total) by mouth once daily. 30 tablet 2 11/5/2018 at Unknown time    mesalamine (APRISO) 0.375 gram Cp24 Take 4 capsules (1.5 g total) by mouth once daily. 120 capsule 11 Past Week  at Unknown time    hydrocortisone 2.5 % cream Apply topically 2 (two) times daily. for 10 days 28 g 0        Physical Exam:    Vital Signs:   Vitals:    11/06/18 0930   BP: (!) 173/114   Pulse: 73   Resp: 15   Temp: 98.4 °F (36.9 °C)       General Appearance: Well appearing in no acute distress  Abdomen: Soft, non tender, non distended with normal bowel sounds, no masses    Labs:  Lab Results   Component Value Date    WBC 4.77 10/16/2018    HGB 12.9 10/16/2018    HCT 39.3 10/16/2018     10/16/2018    ALT 9 (L) 10/16/2018    AST 15 10/16/2018     03/17/2018    K 3.7 03/17/2018     03/17/2018    CREATININE 0.8 03/17/2018    BUN 20 03/17/2018    CO2 28 03/17/2018    TSH 1.744 12/11/2017       I have explained the risks and benefits of this endoscopic procedure to the patient including but not limited to bleeding, inflammation, infection, perforation, and death.      Mike Walsh MD

## 2018-11-06 NOTE — TRANSFER OF CARE
"Anesthesia Transfer of Care Note    Patient: Ansley Bautista    Procedure(s) Performed: Procedure(s) (LRB):  COLONOSCOPY (N/A)    Patient location: PACU    Anesthesia Type: general    Transport from OR: Transported from OR on 2-3 L/min O2 by NC with adequate spontaneous ventilation    Post pain: adequate analgesia    Post assessment: no apparent anesthetic complications and tolerated procedure well    Post vital signs: stable    Level of consciousness: sedated    Nausea/Vomiting: no nausea/vomiting    Complications: none    Transfer of care protocol was followed      Last vitals:   Visit Vitals  BP (!) 173/114   Pulse 73   Temp 36.9 °C (98.4 °F)   Resp 15   Ht 5' 2" (1.575 m)   Wt 53.1 kg (117 lb)   SpO2 98%   Breastfeeding? No   BMI 21.40 kg/m²     "

## 2018-11-06 NOTE — [employer] PATIENT INSTRUCTIONS
Discharge Summary/Instructions after an Endoscopic Procedure  Patient Name: Ansley Bautista  Patient MRN: [id number]  Patient YOB: 1965 Tuesday, November 06, 2018  Mike Walsh MD  RESTRICTIONS:  During your procedure today, you received medications for sedation.  These   medications may affect your judgment, balance and coordination.  Therefore,   for 24 hours, you have the following restrictions:   - DO NOT drive a car, operate machinery, make legal/financial decisions,   sign important papers or drink alcohol.    ACTIVITY:  Today: no heavy lifting, straining or running due to procedural   sedation/anesthesia.  The following day: return to full activity including work.  DIET:  Eat and drink normally unless instructed otherwise.     TREATMENT FOR COMMON SIDE EFFECTS:  - Mild abdominal pain, nausea, belching, bloating or excessive gas:  rest,   eat lightly and use a heating pad.  - Sore Throat: treat with throat lozenges and/or gargle with warm salt   water.  - Because air was used during the procedure, expelling large amounts of air   from your rectum or belching is normal.  - If a bowel prep was taken, you may not have a bowel movement for 1-3 days.    This is normal.  SYMPTOMS TO WATCH FOR AND REPORT TO YOUR PHYSICIAN:  1. Abdominal pain or bloating, other than gas cramps.  2. Chest pain.  3. Back pain.  4. Signs of infection such as: chills or fever occurring within 24 hours   after the procedure.  5. Rectal bleeding, which would show as bright red, maroon, or black stools.   (A tablespoon of blood from the rectum is not serious, especially if   hemorrhoids are present.)  6. Vomiting.  7. Weakness or dizziness.  GO DIRECTLY TO THE NEAREST EMERGENCY ROOM IF YOU HAVE ANY OF THE FOLLOWING:      Difficulty breathing              Chills and/or fever over 101 F   Persistent vomiting and/or vomiting blood   Severe abdominal pain   Severe chest pain   Black, tarry stools   Bleeding- more than one  tablespoon   Any other symptom or condition that you feel may need urgent attention  Your doctor recommends these additional instructions:  If any biopsies were taken, your doctors clinic will contact you in 1 to 2   weeks with any results.  - Discharge patient to home.   - Patient has a contact number available for emergencies.  The signs and   symptoms of potential delayed complications were discussed with the   patient.  Return to normal activities tomorrow.  Written discharge   instructions were provided to the patient.   - Resume previous diet.   - Continue present medications.   - Await pathology results.   - Repeat colonoscopy in 2 years to evaluate the response to therapy.   For questions, problems or results please call your physician - Mike Walsh MD at Work:  [phone].  [facility], EMERGENCY ROOM PHONE NUMBER: [phone]  IF A COMPLICATION OR EMERGENCY SITUATION ARISES AND YOU ARE UNABLE TO REACH   YOUR PHYSICIAN - GO DIRECTLY TO THE EMERGENCY ROOM.  Mike Walsh MD  11/6/2018 10:39:43 AM  This report has been verified and signed electronically.  [employer]

## 2018-11-08 ENCOUNTER — PATIENT MESSAGE (OUTPATIENT)
Dept: GASTROENTEROLOGY | Facility: CLINIC | Age: 53
End: 2018-11-08

## 2018-11-12 NOTE — ANESTHESIA POSTPROCEDURE EVALUATION
"Anesthesia Post Evaluation    Patient: Ansley Bautista    Procedure(s) Performed: Procedure(s) (LRB):  COLONOSCOPY (N/A)    Final Anesthesia Type: general  Patient location during evaluation: PACU  Patient participation: Yes- Able to Participate  Level of consciousness: awake and alert  Post-procedure vital signs: reviewed and stable  Pain management: adequate  Airway patency: patent  PONV status at discharge: No PONV  Anesthetic complications: no      Cardiovascular status: stable  Respiratory status: unassisted and spontaneous ventilation  Hydration status: euvolemic  Follow-up not needed.        Visit Vitals  BP (!) 149/71   Pulse 60   Temp 36.5 °C (97.7 °F)   Resp 16   Ht 5' 2" (1.575 m)   Wt 53.1 kg (117 lb)   SpO2 100%   Breastfeeding? No   BMI 21.40 kg/m²       Pain/Starr Score: No Data Recorded      "

## 2018-11-13 ENCOUNTER — TELEPHONE (OUTPATIENT)
Dept: ENDOSCOPY | Facility: HOSPITAL | Age: 53
End: 2018-11-13

## 2018-11-16 ENCOUNTER — PATIENT MESSAGE (OUTPATIENT)
Dept: GASTROENTEROLOGY | Facility: CLINIC | Age: 53
End: 2018-11-16

## 2018-11-16 DIAGNOSIS — K51.011 ULCERATIVE PANCOLITIS WITH RECTAL BLEEDING: Primary | ICD-10-CM

## 2018-11-16 RX ORDER — AZATHIOPRINE 50 MG/1
50 TABLET ORAL DAILY
Qty: 90 TABLET | Refills: 0 | Status: SHIPPED | OUTPATIENT
Start: 2018-11-16 | End: 2018-11-20 | Stop reason: SDUPTHER

## 2018-11-16 NOTE — TELEPHONE ENCOUNTER
Received a message from pt and a fax from [employer] for a 3 mth supply of Imuran   Allergies reviewed   Last Labs  10/16/2018    I will CX the script at [facility] and pended script for approval     Called [facility] and spoke with Kareen JUNIOR Imuran Script

## 2018-11-20 DIAGNOSIS — K51.011 ULCERATIVE PANCOLITIS WITH RECTAL BLEEDING: ICD-10-CM

## 2018-11-20 RX ORDER — AZATHIOPRINE 50 MG/1
50 TABLET ORAL DAILY
Qty: 30 TABLET | Refills: 1 | Status: SHIPPED | OUTPATIENT
Start: 2018-11-20 | End: 2018-12-10 | Stop reason: SDUPTHER

## 2018-11-20 NOTE — TELEPHONE ENCOUNTER
[facility] not [facility] Azathioprine   Called and explained to pt and she said that is crazy because they told her they did  I asked if she had picked up the script from [facility] and she said no she only has 6 or 7 days left  I told her we will send to to [facility] with 1 refill and that should put her back on track and after the refill we can try a 3 mth supply from [facility]  I explained [facility] not [facility] as this is the second time they have done this and we do not want any delays   She expressed understanding     Allergies reviewed   Last Labs 10/16/2018    Script pended for approval

## 2018-12-10 ENCOUNTER — PATIENT MESSAGE (OUTPATIENT)
Dept: GASTROENTEROLOGY | Facility: CLINIC | Age: 53
End: 2018-12-10

## 2018-12-10 DIAGNOSIS — K51.011 ULCERATIVE PANCOLITIS WITH RECTAL BLEEDING: Primary | ICD-10-CM

## 2018-12-10 RX ORDER — AZATHIOPRINE 50 MG/1
50 TABLET ORAL DAILY
Qty: 90 TABLET | Refills: 0 | Status: SHIPPED | OUTPATIENT
Start: 2018-12-10 | End: 2019-01-28 | Stop reason: SDUPTHER

## 2018-12-10 NOTE — TELEPHONE ENCOUNTER
Left VM informing pt that Rx has been sent to [facility], also requested that pt call back to scheduled CBC, & CMP in 1/2019.    [phone] call back number provided.

## 2018-12-11 NOTE — TELEPHONE ENCOUNTER
Pt will have a better idea of when she can schedule these labs, once January arrives. Will postpone this message to the first week in January.

## 2019-01-16 ENCOUNTER — LAB VISIT (OUTPATIENT)
Dept: LAB | Facility: HOSPITAL | Age: 54
End: 2019-01-16
Payer: COMMERCIAL

## 2019-01-16 ENCOUNTER — PATIENT MESSAGE (OUTPATIENT)
Dept: GASTROENTEROLOGY | Facility: CLINIC | Age: 54
End: 2019-01-16

## 2019-01-16 DIAGNOSIS — Z79.899 HIGH RISK MEDICATION USE: ICD-10-CM

## 2019-01-16 DIAGNOSIS — K51.011 ULCERATIVE PANCOLITIS WITH RECTAL BLEEDING: ICD-10-CM

## 2019-01-16 LAB
ALBUMIN SERPL BCP-MCNC: 4 G/DL
ALP SERPL-CCNC: 71 U/L
ALT SERPL W/O P-5'-P-CCNC: 9 U/L
AST SERPL-CCNC: 16 U/L
BASOPHILS # BLD AUTO: 0.02 K/UL
BASOPHILS NFR BLD: 0.3 %
BILIRUB DIRECT SERPL-MCNC: 0.4 MG/DL
BILIRUB SERPL-MCNC: 1 MG/DL
DIFFERENTIAL METHOD: ABNORMAL
EOSINOPHIL # BLD AUTO: 0 K/UL
EOSINOPHIL NFR BLD: 0.4 %
ERYTHROCYTE [DISTWIDTH] IN BLOOD BY AUTOMATED COUNT: 12.6 %
HCT VFR BLD AUTO: 41.1 %
HGB BLD-MCNC: 13.3 G/DL
IMM GRANULOCYTES # BLD AUTO: 0.03 K/UL
IMM GRANULOCYTES NFR BLD AUTO: 0.4 %
LYMPHOCYTES # BLD AUTO: 1.2 K/UL
LYMPHOCYTES NFR BLD: 16.5 %
MCH RBC QN AUTO: 29.6 PG
MCHC RBC AUTO-ENTMCNC: 32.4 G/DL
MCV RBC AUTO: 91 FL
MONOCYTES # BLD AUTO: 0.5 K/UL
MONOCYTES NFR BLD: 7.5 %
NEUTROPHILS # BLD AUTO: 5.3 K/UL
NEUTROPHILS NFR BLD: 74.9 %
NRBC BLD-RTO: 0 /100 WBC
PLATELET # BLD AUTO: 198 K/UL
PMV BLD AUTO: 10.4 FL
PROT SERPL-MCNC: 7.2 G/DL
RBC # BLD AUTO: 4.5 M/UL
WBC # BLD AUTO: 7.02 K/UL

## 2019-01-16 PROCEDURE — 36415 COLL VENOUS BLD VENIPUNCTURE: CPT

## 2019-01-16 PROCEDURE — 85025 COMPLETE CBC W/AUTO DIFF WBC: CPT

## 2019-01-16 PROCEDURE — 80076 HEPATIC FUNCTION PANEL: CPT

## 2019-01-17 ENCOUNTER — PATIENT MESSAGE (OUTPATIENT)
Dept: GASTROENTEROLOGY | Facility: CLINIC | Age: 54
End: 2019-01-17
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
## 2019-01-28 ENCOUNTER — OFFICE VISIT (OUTPATIENT)
Dept: GASTROENTEROLOGY | Facility: CLINIC | Age: 54
End: 2019-01-28
Payer: COMMERCIAL

## 2019-01-28 VITALS
TEMPERATURE: 98 F | BODY MASS INDEX: 22.48 KG/M2 | SYSTOLIC BLOOD PRESSURE: 137 MMHG | HEIGHT: 62 IN | DIASTOLIC BLOOD PRESSURE: 91 MMHG | HEART RATE: 81 BPM | WEIGHT: 122.13 LBS

## 2019-01-28 DIAGNOSIS — M54.50 CHRONIC BILATERAL LOW BACK PAIN WITHOUT SCIATICA: ICD-10-CM

## 2019-01-28 DIAGNOSIS — G89.29 CHRONIC BILATERAL LOW BACK PAIN WITHOUT SCIATICA: ICD-10-CM

## 2019-01-28 DIAGNOSIS — Z86.19 HISTORY OF CLOSTRIDIUM DIFFICILE COLITIS: ICD-10-CM

## 2019-01-28 DIAGNOSIS — F41.9 ANXIETY: ICD-10-CM

## 2019-01-28 DIAGNOSIS — K51.011 ULCERATIVE PANCOLITIS WITH RECTAL BLEEDING: Primary | ICD-10-CM

## 2019-01-28 DIAGNOSIS — I10 ESSENTIAL HYPERTENSION: ICD-10-CM

## 2019-01-28 DIAGNOSIS — Z79.899 HIGH RISK MEDICATION USE: ICD-10-CM

## 2019-01-28 PROCEDURE — 99215 OFFICE O/P EST HI 40 MIN: CPT | Mod: S$GLB,,, | Performed by: NURSE PRACTITIONER

## 2019-01-28 PROCEDURE — 99999 PR PBB SHADOW E&M-EST. PATIENT-LVL IV: CPT | Mod: PBBFAC,,, | Performed by: NURSE PRACTITIONER

## 2019-01-28 PROCEDURE — 99999 PR PBB SHADOW E&M-EST. PATIENT-LVL IV: ICD-10-PCS | Mod: PBBFAC,,, | Performed by: NURSE PRACTITIONER

## 2019-01-28 PROCEDURE — 99215 PR OFFICE/OUTPT VISIT, EST, LEVL V, 40-54 MIN: ICD-10-PCS | Mod: S$GLB,,, | Performed by: NURSE PRACTITIONER

## 2019-01-28 RX ORDER — AZATHIOPRINE 50 MG/1
50 TABLET ORAL DAILY
Qty: 90 TABLET | Refills: 0 | Status: SHIPPED | OUTPATIENT
Start: 2019-01-28 | End: 2019-04-22 | Stop reason: SDUPTHER

## 2019-01-28 NOTE — PATIENT INSTRUCTIONS
Instructions:  - continue imuran 50 mg PO daily-new RX sent to [facility] for a 90-day supply  - continue apriso 1.5 gm/day  - restart canasa nightly, if any return of symptoms  - labs this month with UA  - colonoscopy due 11/2020  - repeat stool calprotectin due in 6/2019, can give patient stool kit  - continue Calcium 1200 mg daily and vitamin D3 1000 IU daily   - restart florastor probiotic, follow directions on box  - Avoid all NSAIDs (Advil, Ibuprofen, Motrin, Aspirin, Naprosyn, Aleve)  - Yearly Pap Smears recommended due 10/2019, mammogram due then as well  - Yearly Eye exam due now  - Yearly Skin exam--wear sun block and hats due fall 2019  - Use antibiotics with caution  - For Dental Procedures, use antibiotics only if absolutely necessary  - Vaccines recommended:  Flu shot yearly  Tetanus every 10 years   Hepatitis A series  Pneumonia 13 (PCV13) vaccine initial  Pneumonia 23 (PPSV23) vaccine 1 year after PCV13, then an additional dose in 5 years, then again at age 65  Shingrix series  - Follow up with MARK Nichols in 6 months

## 2019-02-06 ENCOUNTER — LAB VISIT (OUTPATIENT)
Dept: LAB | Facility: HOSPITAL | Age: 54
End: 2019-02-06
Payer: COMMERCIAL

## 2019-02-06 ENCOUNTER — PATIENT MESSAGE (OUTPATIENT)
Dept: GASTROENTEROLOGY | Facility: CLINIC | Age: 54
End: 2019-02-06

## 2019-02-06 DIAGNOSIS — K51.011 ULCERATIVE PANCOLITIS WITH RECTAL BLEEDING: ICD-10-CM

## 2019-02-06 DIAGNOSIS — Z79.899 HIGH RISK MEDICATION USE: ICD-10-CM

## 2019-02-06 LAB
25(OH)D3+25(OH)D2 SERPL-MCNC: 29 NG/ML
ALBUMIN SERPL BCP-MCNC: 4.1 G/DL
ALP SERPL-CCNC: 71 U/L
ALT SERPL W/O P-5'-P-CCNC: 9 U/L
AST SERPL-CCNC: 15 U/L
BASOPHILS # BLD AUTO: 0.01 K/UL
BASOPHILS NFR BLD: 0.2 %
BILIRUB DIRECT SERPL-MCNC: 0.2 MG/DL
BILIRUB SERPL-MCNC: 0.6 MG/DL
CREAT SERPL-MCNC: 0.9 MG/DL
DIFFERENTIAL METHOD: ABNORMAL
EOSINOPHIL # BLD AUTO: 0 K/UL
EOSINOPHIL NFR BLD: 0.2 %
ERYTHROCYTE [DISTWIDTH] IN BLOOD BY AUTOMATED COUNT: 12.7 %
EST. GFR  (AFRICAN AMERICAN): >60 ML/MIN/1.73 M^2
EST. GFR  (NON AFRICAN AMERICAN): >60 ML/MIN/1.73 M^2
HCT VFR BLD AUTO: 41 %
HGB BLD-MCNC: 13 G/DL
IMM GRANULOCYTES # BLD AUTO: 0.02 K/UL
IMM GRANULOCYTES NFR BLD AUTO: 0.4 %
LYMPHOCYTES # BLD AUTO: 1 K/UL
LYMPHOCYTES NFR BLD: 23.3 %
MCH RBC QN AUTO: 28.4 PG
MCHC RBC AUTO-ENTMCNC: 31.7 G/DL
MCV RBC AUTO: 90 FL
MONOCYTES # BLD AUTO: 0.3 K/UL
MONOCYTES NFR BLD: 7.4 %
NEUTROPHILS # BLD AUTO: 3.1 K/UL
NEUTROPHILS NFR BLD: 68.5 %
NRBC BLD-RTO: 0 /100 WBC
PLATELET # BLD AUTO: 235 K/UL
PMV BLD AUTO: 10.4 FL
PROT SERPL-MCNC: 7.3 G/DL
RBC # BLD AUTO: 4.57 M/UL
WBC # BLD AUTO: 4.46 K/UL

## 2019-02-06 PROCEDURE — 36415 COLL VENOUS BLD VENIPUNCTURE: CPT

## 2019-02-06 PROCEDURE — 82565 ASSAY OF CREATININE: CPT

## 2019-02-06 PROCEDURE — 86481 TB AG RESPONSE T-CELL SUSP: CPT

## 2019-02-06 PROCEDURE — 85025 COMPLETE CBC W/AUTO DIFF WBC: CPT

## 2019-02-06 PROCEDURE — 80076 HEPATIC FUNCTION PANEL: CPT

## 2019-02-06 PROCEDURE — 82306 VITAMIN D 25 HYDROXY: CPT

## 2019-02-07 ENCOUNTER — PATIENT MESSAGE (OUTPATIENT)
Dept: GASTROENTEROLOGY | Facility: CLINIC | Age: 54
End: 2019-02-07

## 2019-02-11 ENCOUNTER — PATIENT MESSAGE (OUTPATIENT)
Dept: GASTROENTEROLOGY | Facility: CLINIC | Age: 54
End: 2019-02-11

## 2019-02-11 LAB — T-SPOT TB SCREENING TEST: NORMAL

## 2019-02-23 ENCOUNTER — PATIENT MESSAGE (OUTPATIENT)
Dept: GASTROENTEROLOGY | Facility: CLINIC | Age: 54
End: 2019-02-23

## 2019-02-23 DIAGNOSIS — K51.00 ULCERATIVE PANCOLITIS: ICD-10-CM

## 2019-02-25 RX ORDER — MESALAMINE 0.38 G/1
1.5 CAPSULE, EXTENDED RELEASE ORAL DAILY
Qty: 360 CAPSULE | Refills: 3 | Status: SHIPPED | OUTPATIENT
Start: 2019-02-25 | End: 2020-03-04 | Stop reason: SDUPTHER

## 2019-02-25 NOTE — TELEPHONE ENCOUNTER
Pt requesting refill on her Apriso  Allergies reviewed   Last Labs  01/2019  Script pended for approval     Next appoint Due in July

## 2019-03-04 ENCOUNTER — PATIENT MESSAGE (OUTPATIENT)
Dept: GASTROENTEROLOGY | Facility: CLINIC | Age: 54
End: 2019-03-04

## 2019-03-04 ENCOUNTER — LAB VISIT (OUTPATIENT)
Dept: LAB | Facility: HOSPITAL | Age: 54
End: 2019-03-04
Payer: COMMERCIAL

## 2019-03-04 DIAGNOSIS — Z79.899 HIGH RISK MEDICATION USE: ICD-10-CM

## 2019-03-04 DIAGNOSIS — K51.011 ULCERATIVE PANCOLITIS WITH RECTAL BLEEDING: ICD-10-CM

## 2019-03-04 LAB
BILIRUB UR QL STRIP: NEGATIVE
CLARITY UR REFRACT.AUTO: CLEAR
COLOR UR AUTO: YELLOW
GLUCOSE UR QL STRIP: NEGATIVE
HGB UR QL STRIP: NEGATIVE
KETONES UR QL STRIP: NEGATIVE
LEUKOCYTE ESTERASE UR QL STRIP: NEGATIVE
NITRITE UR QL STRIP: NEGATIVE
PH UR STRIP: 6 [PH] (ref 5–8)
PROT UR QL STRIP: NEGATIVE
SP GR UR STRIP: 1.01 (ref 1–1.03)
URN SPEC COLLECT METH UR: NORMAL

## 2019-03-04 PROCEDURE — 81003 URINALYSIS AUTO W/O SCOPE: CPT

## 2019-03-04 NOTE — TELEPHONE ENCOUNTER
Called and spoke with pt  She apologized as she was just getting around to turning in her stool studies.    She states she was supposed to give a urine as well but she only had the container for the stool studies.  I put the container and orders at  for the UA and pt will

## 2019-03-22 ENCOUNTER — PATIENT MESSAGE (OUTPATIENT)
Dept: GASTROENTEROLOGY | Facility: CLINIC | Age: 54
End: 2019-03-22

## 2019-04-22 DIAGNOSIS — K51.011 ULCERATIVE PANCOLITIS WITH RECTAL BLEEDING: Primary | ICD-10-CM

## 2019-04-22 RX ORDER — AZATHIOPRINE 50 MG/1
50 TABLET ORAL DAILY
Qty: 90 TABLET | Refills: 0 | Status: SHIPPED | OUTPATIENT
Start: 2019-04-22 | End: 2019-10-29 | Stop reason: SDUPTHER

## 2019-04-23 ENCOUNTER — TELEPHONE (OUTPATIENT)
Dept: GASTROENTEROLOGY | Facility: CLINIC | Age: 54
End: 2019-04-23

## 2019-04-23 NOTE — TELEPHONE ENCOUNTER
----- Message from TANGELA Gamez sent at 4/22/2019  4:40 PM CDT -----  RX for Imuran refilled.  Patient needs labs CBC and LFTs in 5/2019, ordered.    Magdalena

## 2019-04-26 ENCOUNTER — TELEPHONE (OUTPATIENT)
Dept: GASTROENTEROLOGY | Facility: CLINIC | Age: 54
End: 2019-04-26

## 2019-04-26 NOTE — TELEPHONE ENCOUNTER
----- Message from Flori García sent at 4/26/2019 11:53 AM CDT -----  Contact: Self- [phone]  Jillian- pt returning missed call in regards to scheduling labs- please contact pt at [phone]

## 2019-04-26 NOTE — TELEPHONE ENCOUNTER
Returned pt's call, spoke w/ pt.  Appt scheduled for 04/27 @3:30pm at [facility].  Pt accepted appt.

## 2019-04-27 ENCOUNTER — LAB VISIT (OUTPATIENT)
Dept: LAB | Facility: HOSPITAL | Age: 54
End: 2019-04-27
Payer: COMMERCIAL

## 2019-04-27 DIAGNOSIS — K51.011 ULCERATIVE PANCOLITIS WITH RECTAL BLEEDING: ICD-10-CM

## 2019-04-27 LAB
ALBUMIN SERPL BCP-MCNC: 3.8 G/DL (ref 3.5–5.2)
ALP SERPL-CCNC: 69 U/L (ref 55–135)
ALT SERPL W/O P-5'-P-CCNC: 9 U/L (ref 10–44)
AST SERPL-CCNC: 14 U/L (ref 10–40)
BASOPHILS # BLD AUTO: 0.02 K/UL (ref 0–0.2)
BASOPHILS NFR BLD: 0.5 % (ref 0–1.9)
BILIRUB DIRECT SERPL-MCNC: 0.2 MG/DL (ref 0.1–0.3)
BILIRUB SERPL-MCNC: 0.5 MG/DL (ref 0.1–1)
DIFFERENTIAL METHOD: ABNORMAL
EOSINOPHIL # BLD AUTO: 0 K/UL (ref 0–0.5)
EOSINOPHIL NFR BLD: 0.7 % (ref 0–8)
ERYTHROCYTE [DISTWIDTH] IN BLOOD BY AUTOMATED COUNT: 12.6 % (ref 11.5–14.5)
HCT VFR BLD AUTO: 40.8 % (ref 37–48.5)
HGB BLD-MCNC: 12.9 G/DL (ref 12–16)
LYMPHOCYTES # BLD AUTO: 1.3 K/UL (ref 1–4.8)
LYMPHOCYTES NFR BLD: 29.3 % (ref 18–48)
MCH RBC QN AUTO: 29.6 PG (ref 27–31)
MCHC RBC AUTO-ENTMCNC: 31.6 G/DL (ref 32–36)
MCV RBC AUTO: 94 FL (ref 82–98)
MONOCYTES # BLD AUTO: 0.5 K/UL (ref 0.3–1)
MONOCYTES NFR BLD: 10.9 % (ref 4–15)
NEUTROPHILS # BLD AUTO: 2.5 K/UL (ref 1.8–7.7)
NEUTROPHILS NFR BLD: 58.4 % (ref 38–73)
NRBC BLD-RTO: 0 /100 WBC
PLATELET # BLD AUTO: 212 K/UL (ref 150–350)
PMV BLD AUTO: 11.2 FL (ref 9.2–12.9)
PROT SERPL-MCNC: 6.7 G/DL (ref 6–8.4)
RBC # BLD AUTO: 4.36 M/UL (ref 4–5.4)
WBC # BLD AUTO: 4.33 K/UL (ref 3.9–12.7)

## 2019-04-27 PROCEDURE — 85025 COMPLETE CBC W/AUTO DIFF WBC: CPT

## 2019-04-27 PROCEDURE — 80076 HEPATIC FUNCTION PANEL: CPT

## 2019-04-27 PROCEDURE — 36415 COLL VENOUS BLD VENIPUNCTURE: CPT

## 2019-04-29 ENCOUNTER — PATIENT MESSAGE (OUTPATIENT)
Dept: GASTROENTEROLOGY | Facility: CLINIC | Age: 54
End: 2019-04-29

## 2019-04-30 ENCOUNTER — PATIENT MESSAGE (OUTPATIENT)
Dept: GASTROENTEROLOGY | Facility: CLINIC | Age: 54
End: 2019-04-30

## 2019-05-14 ENCOUNTER — PATIENT MESSAGE (OUTPATIENT)
Dept: ENDOSCOPY | Facility: HOSPITAL | Age: 54
End: 2019-05-14

## 2019-07-15 ENCOUNTER — PATIENT MESSAGE (OUTPATIENT)
Dept: GASTROENTEROLOGY | Facility: CLINIC | Age: 54
End: 2019-07-15

## 2019-07-15 ENCOUNTER — OFFICE VISIT (OUTPATIENT)
Dept: GASTROENTEROLOGY | Facility: CLINIC | Age: 54
End: 2019-07-15
Payer: COMMERCIAL

## 2019-07-15 ENCOUNTER — LAB VISIT (OUTPATIENT)
Dept: LAB | Facility: HOSPITAL | Age: 54
End: 2019-07-15
Attending: INTERNAL MEDICINE
Payer: COMMERCIAL

## 2019-07-15 VITALS
BODY MASS INDEX: 22.39 KG/M2 | HEART RATE: 71 BPM | OXYGEN SATURATION: 98 % | WEIGHT: 121.69 LBS | HEIGHT: 62 IN | DIASTOLIC BLOOD PRESSURE: 96 MMHG | SYSTOLIC BLOOD PRESSURE: 146 MMHG | TEMPERATURE: 98 F

## 2019-07-15 DIAGNOSIS — K51.011 ULCERATIVE PANCOLITIS WITH RECTAL BLEEDING: Primary | ICD-10-CM

## 2019-07-15 DIAGNOSIS — K51.011 ULCERATIVE PANCOLITIS WITH RECTAL BLEEDING: ICD-10-CM

## 2019-07-15 LAB
ALBUMIN SERPL BCP-MCNC: 4.6 G/DL (ref 3.5–5.2)
ALP SERPL-CCNC: 72 U/L (ref 55–135)
ALT SERPL W/O P-5'-P-CCNC: 11 U/L (ref 10–44)
AST SERPL-CCNC: 18 U/L (ref 10–40)
BASOPHILS # BLD AUTO: 0.02 K/UL (ref 0–0.2)
BASOPHILS NFR BLD: 0.3 % (ref 0–1.9)
BILIRUB DIRECT SERPL-MCNC: 0.3 MG/DL (ref 0.1–0.3)
BILIRUB SERPL-MCNC: 0.7 MG/DL (ref 0.1–1)
DIFFERENTIAL METHOD: ABNORMAL
EOSINOPHIL # BLD AUTO: 0 K/UL (ref 0–0.5)
EOSINOPHIL NFR BLD: 0.1 % (ref 0–8)
ERYTHROCYTE [DISTWIDTH] IN BLOOD BY AUTOMATED COUNT: 12.6 % (ref 11.5–14.5)
HCT VFR BLD AUTO: 44.1 % (ref 37–48.5)
HGB BLD-MCNC: 14.4 G/DL (ref 12–16)
IMM GRANULOCYTES # BLD AUTO: 0.04 K/UL (ref 0–0.04)
IMM GRANULOCYTES NFR BLD AUTO: 0.5 % (ref 0–0.5)
LYMPHOCYTES # BLD AUTO: 1.1 K/UL (ref 1–4.8)
LYMPHOCYTES NFR BLD: 14 % (ref 18–48)
MCH RBC QN AUTO: 29.4 PG (ref 27–31)
MCHC RBC AUTO-ENTMCNC: 32.7 G/DL (ref 32–36)
MCV RBC AUTO: 90 FL (ref 82–98)
MONOCYTES # BLD AUTO: 0.5 K/UL (ref 0.3–1)
MONOCYTES NFR BLD: 6.6 % (ref 4–15)
NEUTROPHILS # BLD AUTO: 6 K/UL (ref 1.8–7.7)
NEUTROPHILS NFR BLD: 78.5 % (ref 38–73)
NRBC BLD-RTO: 0 /100 WBC
PLATELET # BLD AUTO: 266 K/UL (ref 150–350)
PMV BLD AUTO: 10.4 FL (ref 9.2–12.9)
PROT SERPL-MCNC: 8.1 G/DL (ref 6–8.4)
RBC # BLD AUTO: 4.89 M/UL (ref 4–5.4)
WBC # BLD AUTO: 7.69 K/UL (ref 3.9–12.7)

## 2019-07-15 PROCEDURE — 99999 PR PBB SHADOW E&M-EST. PATIENT-LVL IV: CPT | Mod: PBBFAC,,, | Performed by: INTERNAL MEDICINE

## 2019-07-15 PROCEDURE — 80076 HEPATIC FUNCTION PANEL: CPT

## 2019-07-15 PROCEDURE — 99999 PR PBB SHADOW E&M-EST. PATIENT-LVL IV: ICD-10-PCS | Mod: PBBFAC,,, | Performed by: INTERNAL MEDICINE

## 2019-07-15 PROCEDURE — 36415 COLL VENOUS BLD VENIPUNCTURE: CPT

## 2019-07-15 PROCEDURE — 99214 PR OFFICE/OUTPT VISIT, EST, LEVL IV, 30-39 MIN: ICD-10-PCS | Mod: S$GLB,,, | Performed by: INTERNAL MEDICINE

## 2019-07-15 PROCEDURE — 85025 COMPLETE CBC W/AUTO DIFF WBC: CPT

## 2019-07-15 PROCEDURE — 99214 OFFICE O/P EST MOD 30 MIN: CPT | Mod: S$GLB,,, | Performed by: INTERNAL MEDICINE
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
## 2019-07-15 NOTE — PATIENT INSTRUCTIONS
--Continue Apriso and Imuran    --Obtain labs today and then every 3 months    --We have placed a referral for Infectious Disease in order for you to get your vaccines. Since you are immunosuppressed you need flu shot yearly, pneumonia vaccines (PCV13 and PPSV23), hepatitis A, and Shingrix.    --Pap smear and mammogram yearly- you are due on November 2019    --Skin test yearly-you are due on February 2020  ---Remember to protect yourself from the sun by wearing protective hats/eyewear as well as sunscreen    --Eye exam yearly-you are due NOW    --Colonoscopy in 11/2020 or sooner if symptoms arise    --Follow up in clinic in 6 months

## 2019-07-17 ENCOUNTER — PATIENT MESSAGE (OUTPATIENT)
Dept: GASTROENTEROLOGY | Facility: HOSPITAL | Age: 54
End: 2019-07-17

## 2019-10-15 ENCOUNTER — PATIENT MESSAGE (OUTPATIENT)
Dept: GASTROENTEROLOGY | Facility: CLINIC | Age: 54
End: 2019-10-15

## 2019-10-15 ENCOUNTER — LAB VISIT (OUTPATIENT)
Dept: LAB | Facility: HOSPITAL | Age: 54
End: 2019-10-15
Attending: INTERNAL MEDICINE
Payer: COMMERCIAL

## 2019-10-15 DIAGNOSIS — K51.011 ULCERATIVE PANCOLITIS WITH RECTAL BLEEDING: ICD-10-CM

## 2019-10-15 LAB
ALBUMIN SERPL BCP-MCNC: 4.2 G/DL (ref 3.5–5.2)
ALP SERPL-CCNC: 78 U/L (ref 55–135)
ALT SERPL W/O P-5'-P-CCNC: 9 U/L (ref 10–44)
AST SERPL-CCNC: 16 U/L (ref 10–40)
BASOPHILS # BLD AUTO: 0.01 K/UL (ref 0–0.2)
BASOPHILS NFR BLD: 0.2 % (ref 0–1.9)
BILIRUB DIRECT SERPL-MCNC: 0.3 MG/DL (ref 0.1–0.3)
BILIRUB SERPL-MCNC: 0.6 MG/DL (ref 0.1–1)
DIFFERENTIAL METHOD: NORMAL
EOSINOPHIL # BLD AUTO: 0 K/UL (ref 0–0.5)
EOSINOPHIL NFR BLD: 0.7 % (ref 0–8)
ERYTHROCYTE [DISTWIDTH] IN BLOOD BY AUTOMATED COUNT: 13.2 % (ref 11.5–14.5)
HCT VFR BLD AUTO: 43 % (ref 37–48.5)
HGB BLD-MCNC: 13.9 G/DL (ref 12–16)
IMM GRANULOCYTES # BLD AUTO: 0.02 K/UL (ref 0–0.04)
IMM GRANULOCYTES NFR BLD AUTO: 0.5 % (ref 0–0.5)
LYMPHOCYTES # BLD AUTO: 1 K/UL (ref 1–4.8)
LYMPHOCYTES NFR BLD: 23 % (ref 18–48)
MCH RBC QN AUTO: 29.3 PG (ref 27–31)
MCHC RBC AUTO-ENTMCNC: 32.3 G/DL (ref 32–36)
MCV RBC AUTO: 91 FL (ref 82–98)
MONOCYTES # BLD AUTO: 0.4 K/UL (ref 0.3–1)
MONOCYTES NFR BLD: 9.5 % (ref 4–15)
NEUTROPHILS # BLD AUTO: 2.9 K/UL (ref 1.8–7.7)
NEUTROPHILS NFR BLD: 66.1 % (ref 38–73)
NRBC BLD-RTO: 0 /100 WBC
PLATELET # BLD AUTO: 208 K/UL (ref 150–350)
PMV BLD AUTO: 10.6 FL (ref 9.2–12.9)
PROT SERPL-MCNC: 7.2 G/DL (ref 6–8.4)
RBC # BLD AUTO: 4.74 M/UL (ref 4–5.4)
WBC # BLD AUTO: 4.4 K/UL (ref 3.9–12.7)

## 2019-10-15 PROCEDURE — 85025 COMPLETE CBC W/AUTO DIFF WBC: CPT

## 2019-10-15 PROCEDURE — 80076 HEPATIC FUNCTION PANEL: CPT

## 2019-10-15 PROCEDURE — 36415 COLL VENOUS BLD VENIPUNCTURE: CPT

## 2019-10-29 DIAGNOSIS — K51.011 ULCERATIVE PANCOLITIS WITH RECTAL BLEEDING: ICD-10-CM

## 2019-10-29 RX ORDER — AZATHIOPRINE 50 MG/1
50 TABLET ORAL DAILY
Qty: 90 TABLET | Refills: 0 | Status: SHIPPED | OUTPATIENT
Start: 2019-10-29 | End: 2019-12-03 | Stop reason: SDUPTHER

## 2019-10-29 NOTE — TELEPHONE ENCOUNTER
Received a refill request for pts Imuran from [facility]   Allergies reviewed   Last labs  10/15/2019  Script pended     Next appointment 01/20/2020

## 2019-11-19 ENCOUNTER — TELEPHONE (OUTPATIENT)
Dept: GASTROENTEROLOGY | Facility: CLINIC | Age: 54
End: 2019-11-19

## 2019-11-19 NOTE — TELEPHONE ENCOUNTER
Called and spoke with pt  She has been having sinus issues for a wk  She has tried a netting pot and OTC meds and she is not feeling any better Zyrtec and Tylenol.    No fever just a sore throat.  I stated since it has been a wk already and she is not feeling any better she needs to see her PCP or Urgent care  She asked what she should look out for and I explained I will send her a portal message of the antibiotic Dr Walsh does not like.  She expressed understanding and thanked me for the call

## 2019-11-19 NOTE — TELEPHONE ENCOUNTER
----- Message from Alejandra Arellano sent at 11/19/2019  2:49 PM CST -----  Contact: Pt#[phone]  Calling in regards to possible throat infection, and would like to know what she can take with UC. Please call

## 2019-12-02 ENCOUNTER — PATIENT MESSAGE (OUTPATIENT)
Dept: GASTROENTEROLOGY | Facility: CLINIC | Age: 54
End: 2019-12-02

## 2019-12-02 RX ORDER — AMLODIPINE BESYLATE 5 MG/1
5 TABLET ORAL DAILY
Qty: 90 TABLET | Refills: 0 | Status: SHIPPED | OUTPATIENT
Start: 2019-12-02 | End: 2019-12-09 | Stop reason: SDUPTHER

## 2019-12-02 NOTE — TELEPHONE ENCOUNTER
----- Message from Airam Trinh sent at 12/2/2019 12:08 PM CST -----  Contact: Patient   Patient called because she was a patient of Dr. Cardoza at  and she requesting a medication refill for Amlodepine. She will be out of the medication but because its through mail order she needs to get it sent in to the pharmacy. She will be seeing the doctor on Monday 12/9/19.     Please call patient back at [phone] to discuss today.

## 2019-12-03 DIAGNOSIS — K51.011 ULCERATIVE PANCOLITIS WITH RECTAL BLEEDING: ICD-10-CM

## 2019-12-03 RX ORDER — AZATHIOPRINE 50 MG/1
50 TABLET ORAL DAILY
Qty: 90 TABLET | Refills: 0 | Status: SHIPPED | OUTPATIENT
Start: 2019-12-03 | End: 2019-12-09

## 2019-12-03 NOTE — TELEPHONE ENCOUNTER
Pt would like her Imuran sent to [facility] mail order as it is cheaper  Allergies reviewed   Last labs  10/15/2019   Script pended for approval  Pt did not  script from Oct  She has not had any delays on her meds       Next appoint  01/20/2020

## 2019-12-03 NOTE — TELEPHONE ENCOUNTER
----- Message from Katelyn Guajardo MA sent at 12/2/2019  4:53 PM CST -----  Send pts refill to [facility] mail order   No but I told [facility] not to put my medicine back Incase they don't send it in time. I only a a little over a week left. That's why I need u to send today

## 2019-12-08 ENCOUNTER — PATIENT MESSAGE (OUTPATIENT)
Dept: GASTROENTEROLOGY | Facility: CLINIC | Age: 54
End: 2019-12-08

## 2019-12-09 ENCOUNTER — OFFICE VISIT (OUTPATIENT)
Dept: FAMILY MEDICINE | Facility: CLINIC | Age: 54
End: 2019-12-09
Payer: COMMERCIAL

## 2019-12-09 ENCOUNTER — PATIENT MESSAGE (OUTPATIENT)
Dept: GASTROENTEROLOGY | Facility: CLINIC | Age: 54
End: 2019-12-09

## 2019-12-09 VITALS
BODY MASS INDEX: 23.24 KG/M2 | HEART RATE: 63 BPM | TEMPERATURE: 98 F | OXYGEN SATURATION: 95 % | DIASTOLIC BLOOD PRESSURE: 76 MMHG | SYSTOLIC BLOOD PRESSURE: 124 MMHG | HEIGHT: 62 IN | WEIGHT: 126.31 LBS

## 2019-12-09 DIAGNOSIS — Z13.220 SCREENING FOR LIPID DISORDERS: ICD-10-CM

## 2019-12-09 DIAGNOSIS — Z86.19 HISTORY OF CLOSTRIDIUM DIFFICILE COLITIS: ICD-10-CM

## 2019-12-09 DIAGNOSIS — Z12.31 SCREENING MAMMOGRAM, ENCOUNTER FOR: ICD-10-CM

## 2019-12-09 DIAGNOSIS — K51.011 ULCERATIVE PANCOLITIS WITH RECTAL BLEEDING: ICD-10-CM

## 2019-12-09 DIAGNOSIS — I10 ESSENTIAL HYPERTENSION: Primary | ICD-10-CM

## 2019-12-09 PROCEDURE — 99214 OFFICE O/P EST MOD 30 MIN: CPT | Mod: S$GLB,,, | Performed by: INTERNAL MEDICINE

## 2019-12-09 PROCEDURE — 99999 PR PBB SHADOW E&M-EST. PATIENT-LVL III: ICD-10-PCS | Mod: PBBFAC,,, | Performed by: INTERNAL MEDICINE

## 2019-12-09 PROCEDURE — 99999 PR PBB SHADOW E&M-EST. PATIENT-LVL III: CPT | Mod: PBBFAC,,, | Performed by: INTERNAL MEDICINE

## 2019-12-09 PROCEDURE — 99214 PR OFFICE/OUTPT VISIT, EST, LEVL IV, 30-39 MIN: ICD-10-PCS | Mod: S$GLB,,, | Performed by: INTERNAL MEDICINE

## 2019-12-09 RX ORDER — AZATHIOPRINE 50 MG/1
50 TABLET ORAL DAILY
Qty: 90 TABLET | Refills: 0 | Status: SHIPPED | OUTPATIENT
Start: 2019-12-09 | End: 2020-06-03

## 2019-12-09 RX ORDER — AMLODIPINE BESYLATE 5 MG/1
5 TABLET ORAL DAILY
Qty: 90 TABLET | Refills: 3 | Status: SHIPPED | OUTPATIENT
Start: 2019-12-09 | End: 2020-08-28 | Stop reason: SDUPTHER

## 2019-12-09 NOTE — PROGRESS NOTES
[facility] Note    Chief Complaint      Chief Complaint   Patient presents with    Establish Care     f/u from [facility]      History of Present Illness      Ansley Bautista is a 54 y.o. female who presents today to establish care for anxiety.  Patient comes to appointment alone.     Problem List Items Addressed This Visit     Essential hypertension - Primary    Current Assessment & Plan     BP controlled on norvasc 5 mg daily.   No CP/SOB/HA.         Relevant Orders    Lipid panel    CBC auto differential    Ulcerative pancolitis with rectal bleeding    Current Assessment & Plan     Stable on Imuran and mesalamine.  Sees Dr. Walsh.  UTD on c-scope, no flare at present.         Relevant Medications    azaTHIOprine (IMURAN) 50 mg Tab    History of Clostridium difficile colitis    Current Assessment & Plan     Sees Dr. Walsh, at increased risk because of UC.           Other Visit Diagnoses     Screening mammogram, encounter for        Relevant Orders    Mammo Digital Screening Bilat w/ Jonas    Screening for lipid disorders        Relevant Orders    Comprehensive metabolic panel          Health Maintenance   Topic Date Due    Lipid Panel  1965    Mammogram  06/19/2006    TETANUS VACCINE  09/24/2028    Colonoscopy  11/06/2028       Past Medical History:   Diagnosis Date    Anxiety     Colon polyp     Hypertension     Ulcerative pancolitis with rectal bleeding 11/28/2017       Past Surgical History:   Procedure Laterality Date    bilateral knee scopes      breast augumentation      COLONOSCOPY      COLONOSCOPY N/A 11/6/2018    Procedure: COLONOSCOPY;  Surgeon: Mike Walsh MD;  Location: [facility] ([address] [place] [address]);  Service: Endoscopy;  Laterality: N/A;  schedule as 45 minute case-8/2018    Miralax prep OK-ST    cyst removal off of breast      PARTIAL HYSTERECTOMY      surgery on both feet         family history includes Diabetes in her father; Diverticulitis in her mother; Heart  disease in her father; Hypertension in her brother, father, and sister.    Social History     Tobacco Use    Smoking status: Former Smoker    Smokeless tobacco: Never Used    Tobacco comment: quit around 2003; smoked when drank, not daily   Substance Use Topics    Alcohol use: Yes     Alcohol/week: 1.0 - 2.0 standard drinks     Types: 1 - 2 Glasses of wine per week     Comment: seldom    Drug use: No       Review of Systems   Constitutional: Negative for chills and fever.   HENT: Negative for congestion, hearing loss and sore throat.    Eyes: Negative for blurred vision and discharge.   Respiratory: Negative for cough, shortness of breath and wheezing.    Cardiovascular: Negative for chest pain and palpitations.   Gastrointestinal: Negative for blood in stool, constipation, diarrhea, nausea and vomiting.   Genitourinary: Negative for dysuria and hematuria.   Musculoskeletal: Negative for falls, myalgias and neck pain.   Skin: Negative for itching and rash.   Neurological: Negative for dizziness, weakness and headaches.   Endo/Heme/Allergies: Negative for polydipsia.        Outpatient Encounter Medications as of 12/9/2019   Medication Sig Dispense Refill    amLODIPine (NORVASC) 5 MG tablet Take 1 tablet (5 mg total) by mouth once daily. 90 tablet 3    azaTHIOprine (IMURAN) 50 mg Tab Take 1 tablet (50 mg total) by mouth once daily. 90 tablet 0    mesalamine (APRISO) 0.375 gram Cp24 Take 4 capsules (1.5 g total) by mouth once daily. 360 capsule 3    [DISCONTINUED] amLODIPine (NORVASC) 5 MG tablet Take 1 tablet (5 mg total) by mouth once daily. 90 tablet 0    [DISCONTINUED] azaTHIOprine (IMURAN) 50 mg Tab Take 1 tablet (50 mg total) by mouth once daily. 90 tablet 0     No facility-administered encounter medications on file as of 12/9/2019.         Review of patient's allergies indicates:   Allergen Reactions    Pcn [penicillins] Anaphylaxis    Zithromax [azithromycin] Rash       Physical Exam      Vital  "Signs  Temp: 97.6 °F (36.4 °C)  Temp src: Oral  Pulse: 63  SpO2: 95 %  BP: 124/76  BP Location: Left arm  Patient Position: Sitting  Pain Score: 0-No pain  Height and Weight  Height: 5' 2" (157.5 cm)  Weight: 57.3 kg (126 lb 5.2 oz)  BSA (Calculated - sq m): 1.58 sq meters  BMI (Calculated): 23.1  Weight in (lb) to have BMI = 25: 136.4]    Physical Exam   Constitutional: She is oriented to person, place, and time. She appears well-developed and well-nourished.   HENT:   Head: Normocephalic and atraumatic.   Right Ear: External ear normal.   Left Ear: External ear normal.   Eyes: Right eye exhibits no discharge. Left eye exhibits no discharge.   Neck: Normal range of motion. No thyromegaly present.   Cardiovascular: Normal rate, regular rhythm, normal heart sounds and intact distal pulses.   No murmur heard.  Pulmonary/Chest: Effort normal and breath sounds normal. No respiratory distress.   Abdominal: Soft. Bowel sounds are normal. She exhibits no distension. There is no tenderness.   Musculoskeletal: Normal range of motion. She exhibits no deformity.   Neurological: She is alert and oriented to person, place, and time.   Skin: Skin is warm and dry. No rash noted.   Psychiatric: She has a normal mood and affect. Her behavior is normal.        Laboratory:  CBC:  Recent Labs   Lab Result Units 10/15/19  1215   WBC K/uL 4.40   RBC M/uL 4.74   Hemoglobin g/dL 13.9   Hematocrit % 43.0   Platelets K/uL 208   Mean Corpuscular Volume fL 91   Mean Corpuscular Hemoglobin pg 29.3   Mean Corpuscular Hemoglobin Conc g/dL 32.3     CMP:  Recent Labs   Lab Result Units 10/15/19  1215   Albumin g/dL 4.2   Total Protein g/dL 7.2   Alkaline Phosphatase U/L 78   ALT U/L 9*   AST U/L 16   Total Bilirubin mg/dL 0.6     URINALYSIS:  No results for input(s): COLORU, CLARITYU, SPECGRAV, PHUR, PROTEINUA, GLUCOSEU, BILIRUBINCON, BLOODU, WBCU, RBCU, BACTERIA, MUCUS, NITRITE, LEUKOCYTESUR, UROBILINOGEN, HYALINECASTS in the last 2160 hours. "   LIPIDS:  No results for input(s): TSH, HDL, CHOL, TRIG, LDLCALC, CHOLHDL, NONHDLCHOL, TOTALCHOLEST in the last 2160 hours.  TSH:  No results for input(s): TSH in the last 2160 hours.  A1C:  No results for input(s): HGBA1C in the last 2160 hours.    Radiology:  No imaging on file    Assessment/Plan     Ansley Bautista is a 54 y.o.female with:    1. Essential hypertension  - Lipid panel; Future  - CBC auto differential; Future    2. History of Clostridium difficile colitis    3. Ulcerative pancolitis with rectal bleeding  - azaTHIOprine (IMURAN) 50 mg Tab; Take 1 tablet (50 mg total) by mouth once daily.  Dispense: 90 tablet; Refill: 0    4. Screening mammogram, encounter for  - Mammo Digital Screening Bilat w/ Jonas; Future  - Mammo Digital Screening Bilat w/ Jonas    5. Screening for lipid disorders  - Comprehensive metabolic panel; Future    -MMG to DIS  -labs at main campus with Dr. Walsh's labs per patient request  -Continue current medications and maintain follow up with specialists.  Return to clinic in 1 year      Laura Cardoza MD  [facility]      Answers for HPI/ROS submitted by the patient on 12/5/2019   activity change: No  unexpected weight change: No  rhinorrhea: No  trouble swallowing: No  visual disturbance: No  chest tightness: No  polyuria: No  difficulty urinating: No  menstrual problem: No  joint swelling: No  arthralgias: No  confusion: No  dysphoric mood: No

## 2019-12-12 ENCOUNTER — PATIENT MESSAGE (OUTPATIENT)
Dept: GASTROENTEROLOGY | Facility: CLINIC | Age: 54
End: 2019-12-12

## 2019-12-12 NOTE — TELEPHONE ENCOUNTER
Called and spoke with pt  She stated she can not get her medication sent   I told pt I would call [facility] and see what is going on    Called [facility]wini and was transferred twice  [facility] and she had to transfer back to [facility]  Spoke with another lady and once again was transferred back to [facility] access hospital   I quit getting names of everyone as it was pointless     Finally after a hour spoke with Grace   She stated that they have the prescription on file for pts Imuran but it is on hold for future fill on 12/26 because pt picked up a 30 day supply from her local pharmacy

## 2019-12-23 ENCOUNTER — TELEPHONE (OUTPATIENT)
Dept: FAMILY MEDICINE | Facility: CLINIC | Age: 54
End: 2019-12-23

## 2019-12-23 NOTE — TELEPHONE ENCOUNTER
Please let patient know her mammogram results are normal.      Done at [facility], will scan into .

## 2019-12-27 ENCOUNTER — PATIENT OUTREACH (OUTPATIENT)
Dept: ADMINISTRATIVE | Facility: HOSPITAL | Age: 54
End: 2019-12-27

## 2020-01-16 ENCOUNTER — PATIENT MESSAGE (OUTPATIENT)
Dept: FAMILY MEDICINE | Facility: CLINIC | Age: 55
End: 2020-01-16

## 2020-01-17 ENCOUNTER — PATIENT MESSAGE (OUTPATIENT)
Dept: FAMILY MEDICINE | Facility: CLINIC | Age: 55
End: 2020-01-17

## 2020-01-20 ENCOUNTER — OFFICE VISIT (OUTPATIENT)
Dept: GASTROENTEROLOGY | Facility: CLINIC | Age: 55
End: 2020-01-20
Payer: COMMERCIAL

## 2020-01-20 VITALS
DIASTOLIC BLOOD PRESSURE: 89 MMHG | RESPIRATION RATE: 16 BRPM | OXYGEN SATURATION: 99 % | SYSTOLIC BLOOD PRESSURE: 144 MMHG | TEMPERATURE: 98 F | HEART RATE: 75 BPM | WEIGHT: 127.19 LBS | HEIGHT: 62 IN | BODY MASS INDEX: 23.4 KG/M2

## 2020-01-20 DIAGNOSIS — D84.9 IMMUNOSUPPRESSED STATUS: ICD-10-CM

## 2020-01-20 DIAGNOSIS — K51.00 ULCERATIVE PANCOLITIS WITHOUT COMPLICATION: ICD-10-CM

## 2020-01-20 DIAGNOSIS — K63.5 POLYP OF ASCENDING COLON, UNSPECIFIED TYPE: ICD-10-CM

## 2020-01-20 DIAGNOSIS — K51.011 ULCERATIVE PANCOLITIS WITH RECTAL BLEEDING: Primary | ICD-10-CM

## 2020-01-20 DIAGNOSIS — D72.819 LEUKOPENIA, UNSPECIFIED TYPE: ICD-10-CM

## 2020-01-20 DIAGNOSIS — E55.9 VITAMIN D DEFICIENCY: ICD-10-CM

## 2020-01-20 DIAGNOSIS — Z86.19 HISTORY OF CLOSTRIDIUM DIFFICILE COLITIS: ICD-10-CM

## 2020-01-20 PROCEDURE — 99999 PR PBB SHADOW E&M-EST. PATIENT-LVL IV: CPT | Mod: PBBFAC,,, | Performed by: INTERNAL MEDICINE

## 2020-01-20 PROCEDURE — 99214 OFFICE O/P EST MOD 30 MIN: CPT | Mod: S$GLB,,, | Performed by: INTERNAL MEDICINE

## 2020-01-20 PROCEDURE — 99999 PR PBB SHADOW E&M-EST. PATIENT-LVL IV: ICD-10-PCS | Mod: PBBFAC,,, | Performed by: INTERNAL MEDICINE

## 2020-01-20 PROCEDURE — 99214 PR OFFICE/OUTPT VISIT, EST, LEVL IV, 30-39 MIN: ICD-10-PCS | Mod: S$GLB,,, | Performed by: INTERNAL MEDICINE

## 2020-01-20 RX ORDER — CHOLECALCIFEROL (VITAMIN D3) 50 MCG
2000 TABLET ORAL DAILY
COMMUNITY

## 2020-01-20 RX ORDER — CETIRIZINE HYDROCHLORIDE 5 MG/1
5 TABLET ORAL DAILY
COMMUNITY
End: 2021-07-13

## 2020-01-20 NOTE — PATIENT INSTRUCTIONS
-Continue Apriso and Imuran  -Labs the first week of April 2020-CMP/CBC/vitamin D/calprotectin/UA (IBD team please schedule at [facility])  -Follow up with PCP to get your Pneumonia vaccines (Prevnar and Pneumovax) and Hepatitis A  -Eye and skin exam yearly   -Colonoscopy 11/2020 if calprotectin is normal in April we can move colonoscopy to 01/2021  -Follow up in 6 months with Dr. Walsh
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
## 2020-01-20 NOTE — LETTER
January 21, 2020      Laura Cardoza MD  [address]  [address]  [place] [facility] [place] [address]           [facility]  [address]  [place] [address]  Phone: [phone]  Fax: [phone]          Patient: Ansley Bautista   MR Number: [id number]   YOB: 1965   Date of Visit: 1/20/2020       Dear Dr. Laura Cardoza:    Thank you for referring Ansley Bautista to me for evaluation. Attached you will find relevant portions of my assessment and plan of care.    If you have questions, please do not hesitate to call me. I look forward to following Ansley Bautista along with you.    Sincerely,    Mike Walsh MD    Enclosure  CC:  No Recipients    If you would like to receive this communication electronically, please contact [email or web address] or [phone] to request more information on [facility] access.    For providers and/or their staff who would like to refer a patient to [facility], please contact us through our one-stop-shop provider referral line, [facility], at [phone].    If you feel you have received this communication in error or would no longer like to receive these types of communications, please e-mail [email or web address]

## 2020-01-21 ENCOUNTER — PATIENT MESSAGE (OUTPATIENT)
Dept: GASTROENTEROLOGY | Facility: CLINIC | Age: 55
End: 2020-01-21

## 2020-01-21 ENCOUNTER — TELEPHONE (OUTPATIENT)
Dept: GASTROENTEROLOGY | Facility: CLINIC | Age: 55
End: 2020-01-21

## 2020-01-21 PROBLEM — D72.819 LEUKOPENIA: Status: ACTIVE | Noted: 2020-01-21

## 2020-01-21 PROBLEM — K63.5 POLYP OF ASCENDING COLON: Status: ACTIVE | Noted: 2020-01-21

## 2020-01-21 PROBLEM — D84.9 IMMUNOSUPPRESSED STATUS: Status: ACTIVE | Noted: 2020-01-21

## 2020-01-21 NOTE — TELEPHONE ENCOUNTER
-Called patient informed her Dr Walsh was completing her note and notice her WBC was a little low. Dr Walsh would like to order additional labs to be done this week.    -Patient verbalize understanding  -Patient stated she can not schedule lab appointment today she is very busy. She will call back to schedule lab appt

## 2020-01-21 NOTE — TELEPHONE ENCOUNTER
Called patient and reviewed with her the reason for repeat labs and importance. She voiced understanding.  She would like to get labs done at [facility] ([place]) tomorrow, 1/22 at 10:45 am or 11 am. Team to schedule and pt will look out for appt on my chart. No need to call

## 2020-01-21 NOTE — TELEPHONE ENCOUNTER
----- Message from Mike Walsh MD sent at 1/21/2020  1:40 PM CST -----  Please call patient and read her portal message and schedule her this week for CBC/thiopurine metabolites-orders are in    She has not read email and its important    Ansley   I was completing your note today and reviewed your labs on 1/16/20 and your white blood cell count is a little low. This is something we monitor when you are on imuran and apriso. I need to see if you can come in for imuran levels and then want to hold the imuran and repeat a white count 2 weeks later.       Do not stop the imuran until you come in for imuran levels.  When is a good time/date to come in for your labs this week- we can arrange to have them done at [facility].     Dr. Walsh

## 2020-01-22 ENCOUNTER — TELEPHONE (OUTPATIENT)
Dept: GASTROENTEROLOGY | Facility: CLINIC | Age: 55
End: 2020-01-22

## 2020-01-22 NOTE — TELEPHONE ENCOUNTER
Called patient and discussed CBC results showing that white blood cell count is back in the normal range.  We did discuss that this could be a lab error.  Thiopurine metabolites are pending though her my chart does not work and so 1 would these are back I have told her that we will give her a phone call

## 2020-01-22 NOTE — TELEPHONE ENCOUNTER
----- Message from Dawson Stock sent at 1/22/2020  2:47 PM CST -----  Contact: Ansley  Ms. Bautista would like for you to contact her in regards to her test results. She is having problems with My Chart. She can be reached at [phone]

## 2020-01-28 ENCOUNTER — TELEPHONE (OUTPATIENT)
Dept: GASTROENTEROLOGY | Facility: CLINIC | Age: 55
End: 2020-01-28

## 2020-01-28 NOTE — TELEPHONE ENCOUNTER
Call and spoke to patient explain message below   Patient stated she never stop imuran  She truly misunderstood Dr Walsh message   She will continue to take imuran daily

## 2020-01-28 NOTE — TELEPHONE ENCOUNTER
----- Message from Mike Walsh MD sent at 1/28/2020 12:14 PM CST -----  Let patient know that her imuran levels look good. Confirm whether she is still holding the imuran or she has restarted and if she has not restarted make sure she restarts imuran 50 mg (1 tab) daily    Dr. Walsh

## 2020-01-30 ENCOUNTER — PATIENT MESSAGE (OUTPATIENT)
Dept: FAMILY MEDICINE | Facility: CLINIC | Age: 55
End: 2020-01-30

## 2020-01-31 ENCOUNTER — PATIENT MESSAGE (OUTPATIENT)
Dept: FAMILY MEDICINE | Facility: CLINIC | Age: 55
End: 2020-01-31

## 2020-02-29 ENCOUNTER — OFFICE VISIT (OUTPATIENT)
Dept: URGENT CARE | Facility: CLINIC | Age: 55
End: 2020-02-29
Payer: COMMERCIAL

## 2020-02-29 VITALS
SYSTOLIC BLOOD PRESSURE: 155 MMHG | BODY MASS INDEX: 23 KG/M2 | DIASTOLIC BLOOD PRESSURE: 80 MMHG | TEMPERATURE: 97 F | HEART RATE: 65 BPM | OXYGEN SATURATION: 100 % | RESPIRATION RATE: 16 BRPM | HEIGHT: 62 IN | WEIGHT: 125 LBS

## 2020-02-29 DIAGNOSIS — R49.0 HOARSE VOICE QUALITY: ICD-10-CM

## 2020-02-29 DIAGNOSIS — H61.23 BILATERAL HEARING LOSS DUE TO CERUMEN IMPACTION: ICD-10-CM

## 2020-02-29 DIAGNOSIS — H92.01 ACUTE OTALGIA, RIGHT: Primary | ICD-10-CM

## 2020-02-29 DIAGNOSIS — R03.0 ELEVATED BLOOD PRESSURE READING: ICD-10-CM

## 2020-02-29 DIAGNOSIS — H60.591 ACUTE IRRITANT OTITIS EXTERNA, RIGHT: ICD-10-CM

## 2020-02-29 PROCEDURE — 69209 EAR CERUMEN REMOVAL: ICD-10-PCS | Mod: 50,S$GLB,, | Performed by: NURSE PRACTITIONER

## 2020-02-29 PROCEDURE — 99214 PR OFFICE/OUTPT VISIT, EST, LEVL IV, 30-39 MIN: ICD-10-PCS | Mod: 25,S$GLB,, | Performed by: NURSE PRACTITIONER

## 2020-02-29 PROCEDURE — 69209 REMOVE IMPACTED EAR WAX UNI: CPT | Mod: 50,S$GLB,, | Performed by: NURSE PRACTITIONER

## 2020-02-29 PROCEDURE — 99214 OFFICE O/P EST MOD 30 MIN: CPT | Mod: 25,S$GLB,, | Performed by: NURSE PRACTITIONER

## 2020-02-29 RX ORDER — NEOMYCIN SULFATE, POLYMYXIN B SULFATE AND HYDROCORTISONE 10; 3.5; 1 MG/ML; MG/ML; [USP'U]/ML
4 SUSPENSION/ DROPS AURICULAR (OTIC) 3 TIMES DAILY
Qty: 8 ML | Refills: 0 | Status: SHIPPED | OUTPATIENT
Start: 2020-02-29 | End: 2020-03-10

## 2020-02-29 NOTE — PROGRESS NOTES
"Subjective:       Patient ID: Ansley Bautista is a 54 y.o. female.    Vitals:  height is 5' 2" (1.575 m) and weight is 56.7 kg (125 lb). Her oral temperature is 97.2 °F (36.2 °C). Her blood pressure is 155/80 (abnormal) and her pulse is 65. Her respiration is 16 and oxygen saturation is 100%.     Chief Complaint: Otalgia (right ear)    Otalgia    There is pain in the right ear. This is a recurrent problem. The current episode started today. The problem occurs constantly. The problem has been unchanged. There has been no fever. The pain is at a severity of 5/10. The pain is mild. Pertinent negatives include no coughing, diarrhea, headaches, rash, sore throat or vomiting. Treatments tried: chidi pot. The treatment provided no relief.       Constitution: Negative for chills, fatigue and fever.   HENT: Positive for ear pain. Negative for congestion and sore throat.    Neck: Negative for painful lymph nodes.   Cardiovascular: Negative for chest pain and leg swelling.   Eyes: Negative for double vision and blurred vision.   Respiratory: Negative for cough and shortness of breath.    Gastrointestinal: Negative for nausea, vomiting and diarrhea.   Genitourinary: Negative for dysuria, frequency, urgency and history of kidney stones.   Musculoskeletal: Negative for joint pain, joint swelling, muscle cramps and muscle ache.   Skin: Negative for color change, pale, rash and bruising.   Allergic/Immunologic: Negative for seasonal allergies.   Neurological: Negative for dizziness, history of vertigo, light-headedness, passing out and headaches.   Hematologic/Lymphatic: Negative for swollen lymph nodes.   Psychiatric/Behavioral: Negative for nervous/anxious, sleep disturbance and depression. The patient is not nervous/anxious.        Objective:      Physical Exam   Constitutional: She is oriented to person, place, and time. She appears well-developed and well-nourished. She is cooperative.  Non-toxic appearance. She does not appear " ill. No distress.   HENT:   Head: Normocephalic and atraumatic.   Right Ear: Hearing, tympanic membrane and ear canal normal. There is swelling, tenderness (edema and erythema to canal) and cerumen present.   Left Ear: Hearing, tympanic membrane, external ear and ear canal normal. There is cerumen present.   Nose: Nose normal. No mucosal edema, rhinorrhea or nasal deformity. No epistaxis. Right sinus exhibits no maxillary sinus tenderness and no frontal sinus tenderness. Left sinus exhibits no maxillary sinus tenderness and no frontal sinus tenderness.   Mouth/Throat: Uvula is midline, oropharynx is clear and moist and mucous membranes are normal. No trismus in the jaw. Normal dentition. No uvula swelling. No posterior oropharyngeal erythema.   Eyes: Conjunctivae and lids are normal. Right eye exhibits no discharge. Left eye exhibits no discharge. No scleral icterus.   Neck: Trachea normal, normal range of motion, full passive range of motion without pain and phonation normal. Neck supple.   Cardiovascular: Normal rate, regular rhythm, normal heart sounds, intact distal pulses and normal pulses.   Pulmonary/Chest: Effort normal and breath sounds normal. No respiratory distress.   Abdominal: Soft. Normal appearance and bowel sounds are normal. She exhibits no distension, no pulsatile midline mass and no mass. There is no tenderness.   Musculoskeletal: Normal range of motion. She exhibits no edema or deformity.   Neurological: She is alert and oriented to person, place, and time. She exhibits normal muscle tone. Coordination normal.   Skin: Skin is warm, dry, intact, not diaphoretic and not pale.   Psychiatric: She has a normal mood and affect. Her speech is normal and behavior is normal. Judgment and thought content normal. Cognition and memory are normal.   Nursing note and vitals reviewed.        Assessment:       1. Acute otalgia, right    2. Hoarse voice quality    3. Bilateral hearing loss due to cerumen  impaction    4. Elevated blood pressure reading    5. Acute irritant otitis externa, right        Plan:         Acute otalgia, right  -     Ear wax removal    Hoarse voice quality    Bilateral hearing loss due to cerumen impaction    Elevated blood pressure reading    Acute irritant otitis externa, right  -     neomycin-polymyxin-hydrocortisone (CORTISPORIN) 3.5-10,000-1 mg/mL-unit/mL-% otic suspension; Place 4 drops into both ears 3 (three) times daily. for 10 days  Dispense: 8 mL; Refill: 0      Patient Instructions     Always follow your healthcare professional's instructions.    You have received urgent care diagnosis and treatment and you may be released before all of your medical problems are known or treated. Unless you have been given a referral, you (the patient), will arrange for follow-up care as instructed.     If you have been given a referral, [facility] will contact you (their phone number is [phone]). If they do NOT call you by the end of the business day, please call them the following day.      Impacted Earwax    Impacted earwax is a buildup of the natural wax in the ear (cerumen). Impacted earwax is very common. It can cause symptoms such as hearing loss. It can also stop a doctor doing an exam of your ear.    Understanding earwax  Tiny glands in your ear make substances that combine with dead skin cells to form earwax. Earwax helps protect your ear canal from water, dirt, infection, and injury. Over time, earwax travels from the inner part of your ear canal to the entrance of the canal. Then it falls away naturally. But in some cases, it cant travel to the entrance of the canal. This may be because of a health condition or objects put in the ear. With age, earwax tends to become harder and less fluid. Older adults are more likely to have problems with earwax buildup.  What causes impacted earwax?  Earwax can build up because of many health conditions. Some cause a physical blockage. Others  cause too much earwax to be made. Health conditions that can cause earwax buildup include:  · Bony blockage in the ear (osteoma or exostoses)  · Infections, such as swimmers ear (external otitis)  · Skin disease, such as eczema  · Autoimmune diseases, such as lupus  · A narrowed ear canal from birth, chronic inflammation, or injury  · Too much earwax because of injury  · Too much earwax because of  water in the ear canal  Objects repeatedly placed in the ear can also cause impacted earwax. For example, putting cotton swabs in the ear may push the wax deeper into the ear. Over time, this may cause blockage. Hearing aids, swimming plugs, and swim molds can cause the same problem when used again and again.  In some cases, the cause of impacted earwax is not known.  Symptoms of impacted earwax  Excess earwax usually does not cause any symptoms, unless there is a large amount of buildup. Then it may cause symptoms such as:  · Hearing loss  · Earache  · Sense of ear fullness  · Itching in the ear  · Dizziness  · Ringing in the ears  · Cough  Treatment for impacted earwax  If you dont have symptoms, you may not need treatment. Often the earwax goes away on its own with time. If you have symptoms, you may have 1 or more treatments such as:  · Ear drops. These help to soften the earwax. This helps it leave the ear over time.  · Rinsing (irrigation) of the ear canal with water. This is done in a doctors office.  · Removal of the earwax with small tools. This is also done in a doctors office.  In rare cases, some treatments for earwax removal may cause complications such as:  · Swimmers ear (otitis external)  · Earache  · Short-term hearing loss  · Dizziness  · Water trapped in the ear canal  · Hole in the eardrum  · Ringing in the ears  · Bleeding from the ear  Talk with your health care provider about which risks apply most to you.  Dont use these at home  Health care providers do not advise use of ear candles or ear  "vacuum kits. These methods are not shown to work.   Preventing impacted earwax  You may not be able to prevent impacted earwax if you have a health condition that causes it, such as eczema. In other cases, you may be able to prevent earwax buildup by:  · Using ear drops once a week  · Having routine cleaning of the ear about every 6 months  · Not using cotton swabs in the ear  When to call the health care provider  Call your health care provider right away if you have severe symptoms after earwax removal. These may include bleeding or severe ear pain.   Date Last Reviewed: 3/19/2015  © 7444-8896 [employer]. [address] [place] [address], [place] [address]. All rights reserved. This information is not intended as a substitute for professional medical care. Always follow your healthcare professional's instructions.    Cerumen Removal    During your exam, wax was flushed out of your ear canal.  To prevent a "swimmer's ear" from the flush, please use 2-4 drops of a 1:1 solution of rubbing alcohol and distilled white vinegar one time.      Unscented mineral oil 2-4 drops monthly or over-the-counter Debrox drops in bilateral ears to prevent the build up of ear wax is recommended.  Do not use Q-tips in ear canal, this will impact the wax.      Your provider discussed your plan of care with you during your physical exam. It was reviewed once more by the provider when giving you an after visit summary. If the patient is a minor, the discharge instructions were discussed with an adult and that adult acknowledge their understanding of the provider's teaching.               "

## 2020-03-01 NOTE — PATIENT INSTRUCTIONS
Always follow your healthcare professional's instructions.    You have received urgent care diagnosis and treatment and you may be released before all of your medical problems are known or treated. Unless you have been given a referral, you (the patient), will arrange for follow-up care as instructed.     If you have been given a referral, [facility] will contact you (their phone number is [phone]). If they do NOT call you by the end of the business day, please call them the following day.      Impacted Earwax    Impacted earwax is a buildup of the natural wax in the ear (cerumen). Impacted earwax is very common. It can cause symptoms such as hearing loss. It can also stop a doctor doing an exam of your ear.    Understanding earwax  Tiny glands in your ear make substances that combine with dead skin cells to form earwax. Earwax helps protect your ear canal from water, dirt, infection, and injury. Over time, earwax travels from the inner part of your ear canal to the entrance of the canal. Then it falls away naturally. But in some cases, it cant travel to the entrance of the canal. This may be because of a health condition or objects put in the ear. With age, earwax tends to become harder and less fluid. Older adults are more likely to have problems with earwax buildup.  What causes impacted earwax?  Earwax can build up because of many health conditions. Some cause a physical blockage. Others cause too much earwax to be made. Health conditions that can cause earwax buildup include:  · Bony blockage in the ear (osteoma or exostoses)  · Infections, such as swimmers ear (external otitis)  · Skin disease, such as eczema  · Autoimmune diseases, such as lupus  · A narrowed ear canal from birth, chronic inflammation, or injury  · Too much earwax because of injury  · Too much earwax because of  water in the ear canal  Objects repeatedly placed in the ear can also cause impacted earwax. For example, putting cotton swabs  in the ear may push the wax deeper into the ear. Over time, this may cause blockage. Hearing aids, swimming plugs, and swim molds can cause the same problem when used again and again.  In some cases, the cause of impacted earwax is not known.  Symptoms of impacted earwax  Excess earwax usually does not cause any symptoms, unless there is a large amount of buildup. Then it may cause symptoms such as:  · Hearing loss  · Earache  · Sense of ear fullness  · Itching in the ear  · Dizziness  · Ringing in the ears  · Cough  Treatment for impacted earwax  If you dont have symptoms, you may not need treatment. Often the earwax goes away on its own with time. If you have symptoms, you may have 1 or more treatments such as:  · Ear drops. These help to soften the earwax. This helps it leave the ear over time.  · Rinsing (irrigation) of the ear canal with water. This is done in a doctors office.  · Removal of the earwax with small tools. This is also done in a doctors office.  In rare cases, some treatments for earwax removal may cause complications such as:  · Swimmers ear (otitis external)  · Earache  · Short-term hearing loss  · Dizziness  · Water trapped in the ear canal  · Hole in the eardrum  · Ringing in the ears  · Bleeding from the ear  Talk with your health care provider about which risks apply most to you.  Dont use these at home  Health care providers do not advise use of ear candles or ear vacuum kits. These methods are not shown to work.   Preventing impacted earwax  You may not be able to prevent impacted earwax if you have a health condition that causes it, such as eczema. In other cases, you may be able to prevent earwax buildup by:  · Using ear drops once a week  · Having routine cleaning of the ear about every 6 months  · Not using cotton swabs in the ear  When to call the health care provider  Call your health care provider right away if you have severe symptoms after earwax removal. These may include  "bleeding or severe ear pain.   Date Last Reviewed: 3/19/2015  © 5684-7957 [employer]. [address] [place] [address], [place] [address]. All rights reserved. This information is not intended as a substitute for professional medical care. Always follow your healthcare professional's instructions.    Cerumen Removal    During your exam, wax was flushed out of your ear canal.  To prevent a "swimmer's ear" from the flush, please use 2-4 drops of a 1:1 solution of rubbing alcohol and distilled white vinegar one time.      Unscented mineral oil 2-4 drops monthly or over-the-counter Debrox drops in bilateral ears to prevent the build up of ear wax is recommended.  Do not use Q-tips in ear canal, this will impact the wax.      Your provider discussed your plan of care with you during your physical exam. It was reviewed once more by the provider when giving you an after visit summary. If the patient is a minor, the discharge instructions were discussed with an adult and that adult acknowledge their understanding of the provider's teaching.      "

## 2020-03-01 NOTE — PROCEDURES
Ear Cerumen Removal  Date/Time: 2/29/2020 5:55 PM  Performed by: Nancy Burleson NP  Authorized by: Nancy Burleson NP     Consent Done?:  Yes (Verbal)    Local anesthetic:  None  Medication Used:  Other  Location details:  Both ears  Procedure type: irrigation    Cerumen  Removal Results:  Cerumen partially removed     Cerumen completely removed from left canal by irrigation; partially removed by irrigation than entirely removed by curette in the right.

## 2020-03-03 ENCOUNTER — TELEPHONE (OUTPATIENT)
Dept: URGENT CARE | Facility: CLINIC | Age: 55
End: 2020-03-03

## 2020-03-04 DIAGNOSIS — K51.00 ULCERATIVE PANCOLITIS: ICD-10-CM

## 2020-03-04 RX ORDER — MESALAMINE 0.38 G/1
1.5 CAPSULE, EXTENDED RELEASE ORAL DAILY
Qty: 360 CAPSULE | Refills: 2 | Status: SHIPPED | OUTPATIENT
Start: 2020-03-04 | End: 2020-12-08

## 2020-03-04 NOTE — TELEPHONE ENCOUNTER
----- Message from Jass Lieberman sent at 3/4/2020  1:38 PM CST -----  Rx Refill/Request     Is this a Refill or New Rx: Refill   Rx Name and Strength: mesalamine (APRISO) 0.375 gram Cp24   Preferred Pharmacy with phone number: see below  Communication Preference: [phone]  Additional Information: requesting 3 month supply. pls call [facility] at  w/ script. Pt is asking this be completed today bc she only has a week left. Pt asking for a call back once this is completed.

## 2020-03-05 ENCOUNTER — PATIENT MESSAGE (OUTPATIENT)
Dept: GASTROENTEROLOGY | Facility: CLINIC | Age: 55
End: 2020-03-05

## 2020-03-06 ENCOUNTER — PATIENT MESSAGE (OUTPATIENT)
Dept: GASTROENTEROLOGY | Facility: CLINIC | Age: 55
End: 2020-03-06

## 2020-03-09 ENCOUNTER — PATIENT MESSAGE (OUTPATIENT)
Dept: GASTROENTEROLOGY | Facility: CLINIC | Age: 55
End: 2020-03-09

## 2020-03-10 NOTE — TELEPHONE ENCOUNTER
Called pt, no answer, lm explaining I was calling in reference to the lab work and bill.  Left my direct line to return call       As I was typing portal message pt called my dierct line    I explained she is not being double billed.    The test that was repeated was supposed to be repeated to re check her WBC   So she will be responsible for the bill    She thanked me for my help

## 2020-03-18 ENCOUNTER — PATIENT MESSAGE (OUTPATIENT)
Dept: FAMILY MEDICINE | Facility: CLINIC | Age: 55
End: 2020-03-18

## 2020-03-18 ENCOUNTER — TELEPHONE (OUTPATIENT)
Dept: FAMILY MEDICINE | Facility: CLINIC | Age: 55
End: 2020-03-18

## 2020-03-18 RX ORDER — TIZANIDINE 4 MG/1
4 TABLET ORAL EVERY 8 HOURS PRN
Qty: 30 TABLET | Refills: 1 | Status: SHIPPED | OUTPATIENT
Start: 2020-03-18 | End: 2020-03-28

## 2020-03-18 NOTE — TELEPHONE ENCOUNTER
----- Message from Albert Dinero sent at 3/18/2020  2:17 PM CDT -----  Contact: self   Patient called state she speaking to Dr. Cardoza on the [facility] and is having technical issues. Patient state the the Rx that is being offer, she will take. Patient pharmacy is [id number] - [place] - [id number] [facility] [phone] (Phone)  [phone] (Fax). Please call and advise.

## 2020-03-18 NOTE — TELEPHONE ENCOUNTER
Spoke with patient. Pt states she is willing to try a muscle relaxer. (please see previous messages).

## 2020-04-02 ENCOUNTER — PATIENT MESSAGE (OUTPATIENT)
Dept: GASTROENTEROLOGY | Facility: CLINIC | Age: 55
End: 2020-04-02

## 2020-04-07 ENCOUNTER — TELEPHONE (OUTPATIENT)
Dept: GASTROENTEROLOGY | Facility: CLINIC | Age: 55
End: 2020-04-07

## 2020-04-07 NOTE — TELEPHONE ENCOUNTER
----- Message from Mike Walsh MD sent at 4/7/2020 12:25 PM CDT -----  Call and discuss with her labs. WBC count slightly low but better than in mid Jan 2020.   Recommend that she continue imuran 50 mg po daily ( make sure this is dose she is on)  Repeat CBC in 1 month  Confirm that she has no fevers and make sure she lets us know if she develops this    SS  ----- Message -----  From: [facility]  Sent: 4/7/2020  11:03 AM CDT  To: Mike Walsh MD

## 2020-04-07 NOTE — TELEPHONE ENCOUNTER
Called & spoke to pt  - Informed her of slightly low WBC but improved since 1/2020  - Pt confirmed she is on Imuran 50 mg daily  - Pt denies fever or chills & will let us know if she develops this  - Scheduled repeat CBC in 1 month, but pt hesitant to get this done d/t cost  - Educated on the importance of getting these labs especially w/ low WBC monitoring   - Pt expressed understanding but wants me to discuss w/ Dr Walsh to see if absolutely necessary   Called pt did ACT over the phone did get the ACT form in the mail. Score less than 20, mentioned she should see a provider to discuss the asthma. Pt said she was feeling ok and said she does not have insurance to see provider I did mention a possible telephone visit still declined. I said I would send this to PCP and maybe she would prescribe different inhaler  Shira Kelly, CMA

## 2020-04-14 ENCOUNTER — PATIENT MESSAGE (OUTPATIENT)
Dept: GASTROENTEROLOGY | Facility: CLINIC | Age: 55
End: 2020-04-14

## 2020-04-14 NOTE — TELEPHONE ENCOUNTER
Called and discuss lab results with pt  Pt aware appt was change to [facility]V.   VV instructions was sent to pt on 4/2  She expressed understanding

## 2020-04-15 ENCOUNTER — TELEPHONE (OUTPATIENT)
Dept: GASTROENTEROLOGY | Facility: CLINIC | Age: 55
End: 2020-04-15

## 2020-04-15 ENCOUNTER — PATIENT MESSAGE (OUTPATIENT)
Dept: FAMILY MEDICINE | Facility: CLINIC | Age: 55
End: 2020-04-15

## 2020-04-15 ENCOUNTER — PATIENT MESSAGE (OUTPATIENT)
Dept: GASTROENTEROLOGY | Facility: CLINIC | Age: 55
End: 2020-04-15

## 2020-05-19 ENCOUNTER — PATIENT MESSAGE (OUTPATIENT)
Dept: FAMILY MEDICINE | Facility: CLINIC | Age: 55
End: 2020-05-19

## 2020-05-19 ENCOUNTER — TELEPHONE (OUTPATIENT)
Dept: GASTROENTEROLOGY | Facility: CLINIC | Age: 55
End: 2020-05-19

## 2020-05-19 ENCOUNTER — PATIENT MESSAGE (OUTPATIENT)
Dept: GASTROENTEROLOGY | Facility: CLINIC | Age: 55
End: 2020-05-19

## 2020-05-19 DIAGNOSIS — K21.9 GERD WITHOUT ESOPHAGITIS: Primary | ICD-10-CM

## 2020-05-19 DIAGNOSIS — K21.9 GERD WITHOUT ESOPHAGITIS: ICD-10-CM

## 2020-05-19 RX ORDER — PANTOPRAZOLE SODIUM 40 MG/1
40 TABLET, DELAYED RELEASE ORAL DAILY
Qty: 30 TABLET | Refills: 5 | Status: SHIPPED | OUTPATIENT
Start: 2020-05-19 | End: 2020-06-15

## 2020-05-19 RX ORDER — PANTOPRAZOLE SODIUM 40 MG/1
40 TABLET, DELAYED RELEASE ORAL DAILY
Qty: 30 TABLET | Refills: 0 | Status: SHIPPED | OUTPATIENT
Start: 2020-05-19 | End: 2020-05-19 | Stop reason: SDUPTHER

## 2020-05-19 NOTE — TELEPHONE ENCOUNTER
----- Message from Anamika Archer MA sent at 5/19/2020 10:27 AM CDT -----  Contact: [phone]  Pt is having severe heartburn that is keeping her up at night. She would like to discuss medications she can take.     [phone]

## 2020-05-19 NOTE — TELEPHONE ENCOUNTER
Called pt, no answer, left message explaining we have sent in prescription for protonix 40 mg daily. She should take this 30 min prior to breakfast and it will take about 2-3 weeks to take full effect. Small frequent meals, avoid caffeine, citrus and don't eat 4 hours before bedtime if possible. Left main line and temp ext for pt to return call with any questions

## 2020-05-19 NOTE — TELEPHONE ENCOUNTER
Called pt, no answer, lm explaining I was returning her call.  I stated I wanted to know how long she has been experiencing heart burn sx and what are her symptoms.  Also asked if she has tried anything in the past to know what has worked and what has not.  Explained she can send a portal message or she can call me back and left main line and my temp ext

## 2020-05-19 NOTE — TELEPHONE ENCOUNTER
Sending in a month of protonix.  Recommend she see her gastroenterologist or make a follow up with Dr. Cardoza prior to running out of the protonix to determine whether to continue.

## 2020-06-14 DIAGNOSIS — K21.9 GERD WITHOUT ESOPHAGITIS: ICD-10-CM

## 2020-06-15 RX ORDER — PANTOPRAZOLE SODIUM 40 MG/1
TABLET, DELAYED RELEASE ORAL
Qty: 90 TABLET | Refills: 3 | Status: SHIPPED | OUTPATIENT
Start: 2020-06-15 | End: 2020-07-20

## 2020-06-22 ENCOUNTER — PATIENT MESSAGE (OUTPATIENT)
Dept: GASTROENTEROLOGY | Facility: CLINIC | Age: 55
End: 2020-06-22

## 2020-06-22 DIAGNOSIS — K51.011 ULCERATIVE PANCOLITIS WITH RECTAL BLEEDING: ICD-10-CM

## 2020-06-22 DIAGNOSIS — Z79.899 HIGH RISK MEDICATION USE: Primary | ICD-10-CM

## 2020-06-23 RX ORDER — AZATHIOPRINE 50 MG/1
TABLET ORAL
Qty: 90 TABLET | Refills: 0 | Status: SHIPPED | OUTPATIENT
Start: 2020-06-23 | End: 2020-08-31

## 2020-06-23 NOTE — TELEPHONE ENCOUNTER
The prescription we sent to [facility] on 06/13/2020 transaction failed   -stated send [facility] to [facility] and asked for a 3 mth supply   -due for labs in July -  Labs pended will schedule in [place] once signed   Allergies reviewed   Script pended for approval    Next appoint 07/20/2020  V V

## 2020-07-07 ENCOUNTER — PATIENT MESSAGE (OUTPATIENT)
Dept: GASTROENTEROLOGY | Facility: CLINIC | Age: 55
End: 2020-07-07

## 2020-07-07 NOTE — TELEPHONE ENCOUNTER
Call and spoke to pt   -advise pt labs was normal Dr Walsh will discuss further at appt on 7/20  -pt expressed understanding
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
## 2020-07-20 ENCOUNTER — TELEPHONE (OUTPATIENT)
Dept: GASTROENTEROLOGY | Facility: CLINIC | Age: 55
End: 2020-07-20

## 2020-07-20 ENCOUNTER — OFFICE VISIT (OUTPATIENT)
Dept: GASTROENTEROLOGY | Facility: CLINIC | Age: 55
End: 2020-07-20
Payer: COMMERCIAL

## 2020-07-20 DIAGNOSIS — K63.5 POLYP OF ASCENDING COLON, UNSPECIFIED TYPE: ICD-10-CM

## 2020-07-20 DIAGNOSIS — K51.00 ULCERATIVE PANCOLITIS WITHOUT COMPLICATION: Primary | ICD-10-CM

## 2020-07-20 DIAGNOSIS — Z86.19 HISTORY OF CLOSTRIDIUM DIFFICILE COLITIS: ICD-10-CM

## 2020-07-20 DIAGNOSIS — D72.819 LEUKOPENIA, UNSPECIFIED TYPE: ICD-10-CM

## 2020-07-20 DIAGNOSIS — D84.9 IMMUNOSUPPRESSED STATUS: ICD-10-CM

## 2020-07-20 PROCEDURE — 99214 OFFICE O/P EST MOD 30 MIN: CPT | Mod: 95,,, | Performed by: INTERNAL MEDICINE

## 2020-07-20 PROCEDURE — 99214 PR OFFICE/OUTPT VISIT, EST, LEVL IV, 30-39 MIN: ICD-10-PCS | Mod: 95,,, | Performed by: INTERNAL MEDICINE

## 2020-07-20 NOTE — PATIENT INSTRUCTIONS
Instructions:  - schedule labs at 10:30 or 11 am on a wed- first week of October  - pap smear 11/2020  - flu shot this fall  - schedule prevnar 13 and hep A vaccines on a friday

## 2020-07-24 ENCOUNTER — PATIENT MESSAGE (OUTPATIENT)
Dept: GASTROENTEROLOGY | Facility: CLINIC | Age: 55
End: 2020-07-24

## 2020-08-28 ENCOUNTER — TELEPHONE (OUTPATIENT)
Dept: GASTROENTEROLOGY | Facility: HOSPITAL | Age: 55
End: 2020-08-28

## 2020-08-28 ENCOUNTER — TELEPHONE (OUTPATIENT)
Dept: FAMILY MEDICINE | Facility: CLINIC | Age: 55
End: 2020-08-28

## 2020-08-28 RX ORDER — SULFAMETHOXAZOLE AND TRIMETHOPRIM 800; 160 MG/1; MG/1
1 TABLET ORAL 2 TIMES DAILY
Qty: 14 TABLET | Refills: 0 | Status: SHIPPED | OUTPATIENT
Start: 2020-08-28 | End: 2021-01-11

## 2020-08-28 RX ORDER — PANTOPRAZOLE SODIUM 40 MG/1
TABLET, DELAYED RELEASE ORAL
COMMUNITY
Start: 2020-07-22 | End: 2022-02-03

## 2020-08-28 NOTE — TELEPHONE ENCOUNTER
Call and spoke to pt   -was dx with bartholin cyst   -sent message to primary and gyn for the same problems   -given 2 different abx bactrim and erythomycin.   -would like to know which abx she soul take and if she should start Florastor   Please advise

## 2020-08-28 NOTE — TELEPHONE ENCOUNTER
----- Message from Chio Cherry sent at 8/28/2020  1:31 PM CDT -----  Pt is requesting that the Ma to give her a call back

## 2020-08-28 NOTE — TELEPHONE ENCOUNTER
----- Message from Yuliana Martinez sent at 8/28/2020 10:59 AM CDT -----  Contact: RED BRAGA [[id number]]  Type:  Needs Medical Advice    Who Called:  Red   Symptoms (please be specific):  bartholin cyst   How long has patient had these symptoms:   2 days   Pharmacy name and phone #:   [id number] - [place] - [address] [facility] [phone] (Phone)  [phone] (Fax)  Would the patient rather a call back or a response via [facility]?  Call back   Best Call Back Number:  [phone]  Additional Information: inflamed and painful, pt requests antibiotics that will not aggravate her ulcerative colitis

## 2020-08-28 NOTE — TELEPHONE ENCOUNTER
Patient call her gynecologist and he is not in today. Patient has a bartholin cyst on left side vagina. Patient has been doing sitz bath four times a day. Patient states it is red and swollen. Patient is requesting to have antibiotics called into pharmacy. Patient is requesting something that will not aggravate her ulcerative colitis.

## 2020-08-28 NOTE — TELEPHONE ENCOUNTER
Call and spoke to advise discuss message below in detail and per patient mychart message sent with directions   Pt advise and expressed understanding

## 2020-08-28 NOTE — TELEPHONE ENCOUNTER
If both are options given then bactrim is preferred over erythromycin.  After completing antibiotics she can take florastor 1 capsule BID for 2 weeks

## 2020-09-22 ENCOUNTER — TELEPHONE (OUTPATIENT)
Dept: GASTROENTEROLOGY | Facility: HOSPITAL | Age: 55
End: 2020-09-22

## 2020-12-08 ENCOUNTER — PATIENT MESSAGE (OUTPATIENT)
Dept: GASTROENTEROLOGY | Facility: CLINIC | Age: 55
End: 2020-12-08

## 2020-12-08 DIAGNOSIS — K51.00 ULCERATIVE PANCOLITIS: ICD-10-CM

## 2020-12-08 RX ORDER — MESALAMINE 0.38 G/1
1.5 CAPSULE, EXTENDED RELEASE ORAL DAILY
Qty: 360 CAPSULE | Refills: 2 | Status: CANCELLED | OUTPATIENT
Start: 2020-12-08 | End: 2021-03-08

## 2020-12-08 NOTE — TELEPHONE ENCOUNTER
Refill request for Mesalamine     Allergies reviewed     Drug Monitoring labs:  CBC and CMP     Lab Results   Component Value Date    HEPBSAG Negative 11/10/2017    HEPBCAB Negative 11/10/2017     No results found for: TBGOLDPLUS  Lab Results   Component Value Date    [id number] 64 04/07/2020     Lab Results   Component Value Date    WBC 3.85 (L) 07/07/2020    HGB 13.4 07/07/2020    HCT 41.6 07/07/2020    MCV 92 07/07/2020     07/07/2020     Lab Results   Component Value Date    CREATININE 0.88 01/16/2020    ALBUMIN 4.6 07/07/2020    BILITOT 0.7 07/07/2020    ALKPHOS 80 07/07/2020    AST 25 07/07/2020    ALT 12 07/07/2020       Labs due:  Over due/ Lab appt schedule for tomorrow     Next appt: 1/11/2021    RX and labs pended for approval

## 2020-12-09 ENCOUNTER — PATIENT MESSAGE (OUTPATIENT)
Dept: GASTROENTEROLOGY | Facility: CLINIC | Age: 55
End: 2020-12-09

## 2020-12-14 ENCOUNTER — PATIENT MESSAGE (OUTPATIENT)
Dept: GASTROENTEROLOGY | Facility: CLINIC | Age: 55
End: 2020-12-14

## 2020-12-15 ENCOUNTER — PATIENT MESSAGE (OUTPATIENT)
Dept: GASTROENTEROLOGY | Facility: CLINIC | Age: 55
End: 2020-12-15

## 2020-12-15 NOTE — TELEPHONE ENCOUNTER
Called & spoke to pt  - Reviewed lab results  - States okay to send results via portal but can accept calls as well

## 2021-01-03 ENCOUNTER — PATIENT MESSAGE (OUTPATIENT)
Dept: FAMILY MEDICINE | Facility: CLINIC | Age: 56
End: 2021-01-03

## 2021-01-04 ENCOUNTER — PATIENT MESSAGE (OUTPATIENT)
Dept: ADMINISTRATIVE | Facility: HOSPITAL | Age: 56
End: 2021-01-04

## 2021-01-04 RX ORDER — TRAZODONE HYDROCHLORIDE 50 MG/1
50 TABLET ORAL NIGHTLY
Qty: 30 TABLET | Refills: 0 | Status: SHIPPED | OUTPATIENT
Start: 2021-01-04 | End: 2021-02-08

## 2021-01-04 RX ORDER — TRAZODONE HYDROCHLORIDE 50 MG/1
50 TABLET ORAL NIGHTLY
COMMUNITY
End: 2021-01-04 | Stop reason: SDUPTHER

## 2021-01-06 ENCOUNTER — TELEPHONE (OUTPATIENT)
Dept: GASTROENTEROLOGY | Facility: CLINIC | Age: 56
End: 2021-01-06

## 2021-01-08 ENCOUNTER — PATIENT MESSAGE (OUTPATIENT)
Dept: FAMILY MEDICINE | Facility: CLINIC | Age: 56
End: 2021-01-08

## 2021-01-11 ENCOUNTER — OFFICE VISIT (OUTPATIENT)
Dept: GASTROENTEROLOGY | Facility: CLINIC | Age: 56
End: 2021-01-11
Attending: INTERNAL MEDICINE
Payer: COMMERCIAL

## 2021-01-11 ENCOUNTER — PATIENT MESSAGE (OUTPATIENT)
Dept: FAMILY MEDICINE | Facility: CLINIC | Age: 56
End: 2021-01-11

## 2021-01-11 ENCOUNTER — DOCUMENTATION ONLY (OUTPATIENT)
Dept: GASTROENTEROLOGY | Facility: CLINIC | Age: 56
End: 2021-01-11

## 2021-01-11 VITALS
BODY MASS INDEX: 22.68 KG/M2 | SYSTOLIC BLOOD PRESSURE: 149 MMHG | OXYGEN SATURATION: 100 % | DIASTOLIC BLOOD PRESSURE: 96 MMHG | HEART RATE: 56 BPM | TEMPERATURE: 98 F | HEIGHT: 62 IN | WEIGHT: 123.25 LBS

## 2021-01-11 DIAGNOSIS — K51.00 ULCERATIVE PANCOLITIS: Primary | ICD-10-CM

## 2021-01-11 DIAGNOSIS — K63.5 POLYP OF ASCENDING COLON, UNSPECIFIED TYPE: ICD-10-CM

## 2021-01-11 DIAGNOSIS — D72.819 LEUKOPENIA, UNSPECIFIED TYPE: ICD-10-CM

## 2021-01-11 DIAGNOSIS — D84.9 IMMUNOSUPPRESSED STATUS: ICD-10-CM

## 2021-01-11 PROCEDURE — 99214 PR OFFICE/OUTPT VISIT, EST, LEVL IV, 30-39 MIN: ICD-10-PCS | Mod: S$GLB,,, | Performed by: INTERNAL MEDICINE

## 2021-01-11 PROCEDURE — 99214 OFFICE O/P EST MOD 30 MIN: CPT | Mod: S$GLB,,, | Performed by: INTERNAL MEDICINE

## 2021-01-20 ENCOUNTER — TELEPHONE (OUTPATIENT)
Dept: GASTROENTEROLOGY | Facility: CLINIC | Age: 56
End: 2021-01-20

## 2021-01-25 ENCOUNTER — PATIENT MESSAGE (OUTPATIENT)
Dept: FAMILY MEDICINE | Facility: CLINIC | Age: 56
End: 2021-01-25

## 2021-01-25 ENCOUNTER — OFFICE VISIT (OUTPATIENT)
Dept: FAMILY MEDICINE | Facility: CLINIC | Age: 56
End: 2021-01-25
Payer: COMMERCIAL

## 2021-01-25 ENCOUNTER — PATIENT MESSAGE (OUTPATIENT)
Dept: GASTROENTEROLOGY | Facility: CLINIC | Age: 56
End: 2021-01-25

## 2021-01-25 VITALS
WEIGHT: 120.06 LBS | DIASTOLIC BLOOD PRESSURE: 86 MMHG | BODY MASS INDEX: 21.96 KG/M2 | TEMPERATURE: 98 F | SYSTOLIC BLOOD PRESSURE: 138 MMHG | OXYGEN SATURATION: 98 % | HEART RATE: 88 BPM

## 2021-01-25 DIAGNOSIS — Z01.818 PRE-OP EXAM: Primary | ICD-10-CM

## 2021-01-25 DIAGNOSIS — I10 ESSENTIAL HYPERTENSION: ICD-10-CM

## 2021-01-25 DIAGNOSIS — Z41.1 ENCOUNTER FOR COSMETIC SURGERY: ICD-10-CM

## 2021-01-25 PROCEDURE — 93010 ELECTROCARDIOGRAM REPORT: CPT | Mod: S$GLB,,, | Performed by: INTERNAL MEDICINE

## 2021-01-25 PROCEDURE — 93005 EKG 12-LEAD: ICD-10-PCS | Mod: S$GLB,,, | Performed by: INTERNAL MEDICINE

## 2021-01-25 PROCEDURE — 99999 PR PBB SHADOW E&M-EST. PATIENT-LVL III: CPT | Mod: PBBFAC,,, | Performed by: INTERNAL MEDICINE

## 2021-01-25 PROCEDURE — 99999 PR PBB SHADOW E&M-EST. PATIENT-LVL III: ICD-10-PCS | Mod: PBBFAC,,, | Performed by: INTERNAL MEDICINE

## 2021-01-25 PROCEDURE — 99213 PR OFFICE/OUTPT VISIT, EST, LEVL III, 20-29 MIN: ICD-10-PCS | Mod: S$GLB,,, | Performed by: INTERNAL MEDICINE

## 2021-01-25 PROCEDURE — 93010 EKG 12-LEAD: ICD-10-PCS | Mod: S$GLB,,, | Performed by: INTERNAL MEDICINE

## 2021-01-25 PROCEDURE — 93005 ELECTROCARDIOGRAM TRACING: CPT | Mod: S$GLB,,, | Performed by: INTERNAL MEDICINE

## 2021-01-25 PROCEDURE — 99213 OFFICE O/P EST LOW 20 MIN: CPT | Mod: S$GLB,,, | Performed by: INTERNAL MEDICINE

## 2021-01-26 ENCOUNTER — TELEPHONE (OUTPATIENT)
Dept: ADMINISTRATIVE | Facility: HOSPITAL | Age: 56
End: 2021-01-26

## 2021-01-26 ENCOUNTER — PATIENT OUTREACH (OUTPATIENT)
Dept: ADMINISTRATIVE | Facility: HOSPITAL | Age: 56
End: 2021-01-26

## 2021-01-26 ENCOUNTER — TELEPHONE (OUTPATIENT)
Dept: FAMILY MEDICINE | Facility: CLINIC | Age: 56
End: 2021-01-26

## 2021-01-26 DIAGNOSIS — K51.011 ULCERATIVE PANCOLITIS WITH RECTAL BLEEDING: ICD-10-CM

## 2021-01-26 DIAGNOSIS — Z79.899 HIGH RISK MEDICATION USE: ICD-10-CM

## 2021-01-27 RX ORDER — AZATHIOPRINE 50 MG/1
TABLET ORAL
Qty: 90 TABLET | Refills: 0 | Status: SHIPPED | OUTPATIENT
Start: 2021-01-27 | End: 2021-01-29

## 2021-04-28 ENCOUNTER — TELEPHONE (OUTPATIENT)
Dept: GASTROENTEROLOGY | Facility: CLINIC | Age: 56
End: 2021-04-28

## 2021-04-30 ENCOUNTER — PATIENT MESSAGE (OUTPATIENT)
Dept: GASTROENTEROLOGY | Facility: CLINIC | Age: 56
End: 2021-04-30

## 2021-05-04 ENCOUNTER — PATIENT MESSAGE (OUTPATIENT)
Dept: RESEARCH | Facility: HOSPITAL | Age: 56
End: 2021-05-04

## 2021-05-05 ENCOUNTER — TELEPHONE (OUTPATIENT)
Dept: GASTROENTEROLOGY | Facility: CLINIC | Age: 56
End: 2021-05-05

## 2021-05-05 ENCOUNTER — PATIENT MESSAGE (OUTPATIENT)
Dept: GASTROENTEROLOGY | Facility: CLINIC | Age: 56
End: 2021-05-05

## 2021-07-13 ENCOUNTER — OFFICE VISIT (OUTPATIENT)
Dept: GASTROENTEROLOGY | Facility: CLINIC | Age: 56
End: 2021-07-13
Payer: COMMERCIAL

## 2021-07-13 VITALS
RESPIRATION RATE: 16 BRPM | BODY MASS INDEX: 22.39 KG/M2 | OXYGEN SATURATION: 100 % | WEIGHT: 121.69 LBS | TEMPERATURE: 98 F | HEART RATE: 59 BPM | DIASTOLIC BLOOD PRESSURE: 75 MMHG | SYSTOLIC BLOOD PRESSURE: 153 MMHG | HEIGHT: 62 IN

## 2021-07-13 DIAGNOSIS — D84.9 IMMUNOSUPPRESSED STATUS: Primary | ICD-10-CM

## 2021-07-13 DIAGNOSIS — K51.00 ULCERATIVE PANCOLITIS WITHOUT COMPLICATION: ICD-10-CM

## 2021-07-13 PROCEDURE — 99214 PR OFFICE/OUTPT VISIT, EST, LEVL IV, 30-39 MIN: ICD-10-PCS | Mod: S$GLB,,, | Performed by: INTERNAL MEDICINE

## 2021-07-13 PROCEDURE — 99214 OFFICE O/P EST MOD 30 MIN: CPT | Mod: S$GLB,,, | Performed by: INTERNAL MEDICINE

## 2021-07-13 RX ORDER — ESTRADIOL 1 MG/1
1 TABLET ORAL DAILY
COMMUNITY
Start: 2021-06-21 | End: 2021-11-08 | Stop reason: SDUPTHER

## 2021-07-18 ENCOUNTER — OFFICE VISIT (OUTPATIENT)
Dept: URGENT CARE | Facility: CLINIC | Age: 56
End: 2021-07-18
Payer: COMMERCIAL

## 2021-07-18 VITALS
WEIGHT: 120 LBS | DIASTOLIC BLOOD PRESSURE: 92 MMHG | RESPIRATION RATE: 16 BRPM | HEART RATE: 83 BPM | TEMPERATURE: 99 F | SYSTOLIC BLOOD PRESSURE: 134 MMHG | HEIGHT: 62 IN | BODY MASS INDEX: 22.08 KG/M2 | OXYGEN SATURATION: 99 %

## 2021-07-18 DIAGNOSIS — R53.83 FATIGUE, UNSPECIFIED TYPE: ICD-10-CM

## 2021-07-18 DIAGNOSIS — U07.1 COVID-19 VIRUS DETECTED: ICD-10-CM

## 2021-07-18 DIAGNOSIS — R52 BODY ACHES: ICD-10-CM

## 2021-07-18 DIAGNOSIS — U07.1 COVID-19: Primary | ICD-10-CM

## 2021-07-18 LAB
CTP QC/QA: YES
SARS-COV-2 RDRP RESP QL NAA+PROBE: POSITIVE

## 2021-07-18 PROCEDURE — U0002: ICD-10-PCS | Mod: QW,S$GLB,, | Performed by: PHYSICIAN ASSISTANT

## 2021-07-18 PROCEDURE — U0002 COVID-19 LAB TEST NON-CDC: HCPCS | Mod: QW,S$GLB,, | Performed by: PHYSICIAN ASSISTANT

## 2021-07-18 PROCEDURE — 99214 PR OFFICE/OUTPT VISIT, EST, LEVL IV, 30-39 MIN: ICD-10-PCS | Mod: S$GLB,,, | Performed by: PHYSICIAN ASSISTANT

## 2021-07-18 PROCEDURE — 99214 OFFICE O/P EST MOD 30 MIN: CPT | Mod: S$GLB,,, | Performed by: PHYSICIAN ASSISTANT

## 2021-07-23 ENCOUNTER — PATIENT MESSAGE (OUTPATIENT)
Dept: FAMILY MEDICINE | Facility: CLINIC | Age: 56
End: 2021-07-23

## 2021-08-03 ENCOUNTER — TELEPHONE (OUTPATIENT)
Dept: FAMILY MEDICINE | Facility: CLINIC | Age: 56
End: 2021-08-03

## 2021-08-04 ENCOUNTER — OFFICE VISIT (OUTPATIENT)
Dept: FAMILY MEDICINE | Facility: CLINIC | Age: 56
End: 2021-08-04
Payer: COMMERCIAL

## 2021-08-04 DIAGNOSIS — N63.10 BREAST MASS, RIGHT: Primary | ICD-10-CM

## 2021-08-04 PROCEDURE — 99214 PR OFFICE/OUTPT VISIT, EST, LEVL IV, 30-39 MIN: ICD-10-PCS | Mod: S$GLB,,, | Performed by: INTERNAL MEDICINE

## 2021-08-04 PROCEDURE — 99999 PR PBB SHADOW E&M-EST. PATIENT-LVL IV: CPT | Mod: PBBFAC,,, | Performed by: INTERNAL MEDICINE

## 2021-08-04 PROCEDURE — 99214 OFFICE O/P EST MOD 30 MIN: CPT | Mod: S$GLB,,, | Performed by: INTERNAL MEDICINE

## 2021-08-04 PROCEDURE — 99999 PR PBB SHADOW E&M-EST. PATIENT-LVL IV: ICD-10-PCS | Mod: PBBFAC,,, | Performed by: INTERNAL MEDICINE

## 2021-08-06 ENCOUNTER — PATIENT MESSAGE (OUTPATIENT)
Dept: FAMILY MEDICINE | Facility: CLINIC | Age: 56
End: 2021-08-06

## 2021-08-08 ENCOUNTER — PATIENT MESSAGE (OUTPATIENT)
Dept: GASTROENTEROLOGY | Facility: CLINIC | Age: 56
End: 2021-08-08

## 2021-08-09 ENCOUNTER — PATIENT MESSAGE (OUTPATIENT)
Dept: GASTROENTEROLOGY | Facility: CLINIC | Age: 56
End: 2021-08-09

## 2021-08-19 DIAGNOSIS — R22.2 LUMP IN CHEST: Primary | ICD-10-CM

## 2021-08-20 ENCOUNTER — TELEPHONE (OUTPATIENT)
Dept: FAMILY MEDICINE | Facility: CLINIC | Age: 56
End: 2021-08-20

## 2021-09-15 ENCOUNTER — TELEPHONE (OUTPATIENT)
Dept: FAMILY MEDICINE | Facility: CLINIC | Age: 56
End: 2021-09-15

## 2021-10-11 ENCOUNTER — TELEPHONE (OUTPATIENT)
Dept: GASTROENTEROLOGY | Facility: CLINIC | Age: 56
End: 2021-10-11

## 2021-10-13 ENCOUNTER — PATIENT MESSAGE (OUTPATIENT)
Dept: GASTROENTEROLOGY | Facility: CLINIC | Age: 56
End: 2021-10-13

## 2021-10-13 DIAGNOSIS — D84.9 IMMUNOSUPPRESSED STATUS: Primary | ICD-10-CM

## 2021-10-14 ENCOUNTER — PATIENT MESSAGE (OUTPATIENT)
Dept: GASTROENTEROLOGY | Facility: CLINIC | Age: 56
End: 2021-10-14
Payer: COMMERCIAL

## 2021-10-21 ENCOUNTER — PATIENT MESSAGE (OUTPATIENT)
Dept: FAMILY MEDICINE | Facility: CLINIC | Age: 56
End: 2021-10-21
Payer: COMMERCIAL

## 2021-10-21 RX ORDER — AMLODIPINE BESYLATE 5 MG/1
5 TABLET ORAL DAILY
Qty: 90 TABLET | Refills: 3 | Status: SHIPPED | OUTPATIENT
Start: 2021-10-21 | End: 2021-11-08 | Stop reason: SDUPTHER

## 2021-11-08 ENCOUNTER — TELEPHONE (OUTPATIENT)
Dept: FAMILY MEDICINE | Facility: CLINIC | Age: 56
End: 2021-11-08
Payer: COMMERCIAL

## 2021-11-08 DIAGNOSIS — K51.011 ULCERATIVE PANCOLITIS WITH RECTAL BLEEDING: ICD-10-CM

## 2021-11-08 DIAGNOSIS — Z79.899 HIGH RISK MEDICATION USE: ICD-10-CM

## 2021-11-08 DIAGNOSIS — K51.00 ULCERATIVE PANCOLITIS: ICD-10-CM

## 2021-11-08 RX ORDER — MESALAMINE 0.38 G/1
1.5 CAPSULE, EXTENDED RELEASE ORAL DAILY
Qty: 28 CAPSULE | Refills: 0 | Status: SHIPPED | OUTPATIENT
Start: 2021-11-08 | End: 2022-01-03 | Stop reason: SDUPTHER

## 2021-11-08 RX ORDER — AZATHIOPRINE 50 MG/1
50 TABLET ORAL DAILY
Qty: 7 TABLET | Refills: 0 | Status: SHIPPED | OUTPATIENT
Start: 2021-11-08 | End: 2022-01-04 | Stop reason: SDUPTHER

## 2021-11-08 RX ORDER — TRAZODONE HYDROCHLORIDE 50 MG/1
50 TABLET ORAL NIGHTLY
Qty: 7 TABLET | Refills: 0 | Status: SHIPPED | OUTPATIENT
Start: 2021-11-08 | End: 2022-10-10

## 2021-11-08 RX ORDER — NITROFURANTOIN 25; 75 MG/1; MG/1
100 CAPSULE ORAL 2 TIMES DAILY
Qty: 10 CAPSULE | Refills: 0 | Status: SHIPPED | OUTPATIENT
Start: 2021-11-08 | End: 2022-02-03

## 2021-11-08 RX ORDER — ESTRADIOL 1 MG/1
1 TABLET ORAL DAILY
Qty: 7 TABLET | Refills: 0 | Status: SHIPPED | OUTPATIENT
Start: 2021-11-08 | End: 2022-08-22 | Stop reason: SDUPTHER

## 2021-11-08 RX ORDER — AMLODIPINE BESYLATE 5 MG/1
5 TABLET ORAL DAILY
Qty: 7 TABLET | Refills: 0 | Status: SHIPPED | OUTPATIENT
Start: 2021-11-08 | End: 2022-02-03 | Stop reason: SDUPTHER

## 2021-12-19 ENCOUNTER — PATIENT MESSAGE (OUTPATIENT)
Dept: FAMILY MEDICINE | Facility: CLINIC | Age: 56
End: 2021-12-19
Payer: COMMERCIAL

## 2021-12-20 ENCOUNTER — PATIENT MESSAGE (OUTPATIENT)
Dept: FAMILY MEDICINE | Facility: CLINIC | Age: 56
End: 2021-12-20
Payer: COMMERCIAL

## 2021-12-20 RX ORDER — LIDOCAINE 50 MG/G
1 PATCH TOPICAL DAILY
Qty: 30 PATCH | Refills: 1 | Status: SHIPPED | OUTPATIENT
Start: 2021-12-20 | End: 2023-06-08 | Stop reason: SDUPTHER

## 2022-01-03 ENCOUNTER — PATIENT MESSAGE (OUTPATIENT)
Dept: GASTROENTEROLOGY | Facility: CLINIC | Age: 57
End: 2022-01-03
Payer: COMMERCIAL

## 2022-01-03 DIAGNOSIS — Z79.899 HIGH RISK MEDICATION USE: ICD-10-CM

## 2022-01-03 DIAGNOSIS — K51.00 ULCERATIVE PANCOLITIS: ICD-10-CM

## 2022-01-03 DIAGNOSIS — K51.011 ULCERATIVE PANCOLITIS WITH RECTAL BLEEDING: ICD-10-CM

## 2022-01-03 RX ORDER — MESALAMINE 0.38 G/1
1.5 CAPSULE, EXTENDED RELEASE ORAL DAILY
Qty: 360 CAPSULE | Refills: 1 | Status: SHIPPED | OUTPATIENT
Start: 2022-01-03 | End: 2022-01-04 | Stop reason: SDUPTHER

## 2022-01-03 NOTE — TELEPHONE ENCOUNTER
Received a message from the phone stating pt is requesting a refill on Apriso. Refill pended in different encounter

## 2022-01-03 NOTE — TELEPHONE ENCOUNTER
Refill request for Apriso     Allergies reviewed     Drug Monitoring labs:   CBC and CMP yearly     Lab Results   Component Value Date    HEPBSAG Negative 11/10/2017    HEPBCAB Negative 11/10/2017     No results found for: TBGOLDPLUS  Lab Results   Component Value Date    [id number] 90 10/14/2021     Lab Results   Component Value Date    WBC 3.82 (L) 11/30/2021    HGB 13.8 11/30/2021    HCT 42.7 11/30/2021    MCV 90 11/30/2021     11/30/2021     Lab Results   Component Value Date    CREATININE 0.82 11/30/2021    ALBUMIN 4.6 11/30/2021    BILITOT 0.7 11/30/2021    ALKPHOS 69 11/30/2021    AST 20 11/30/2021    ALT 12 11/30/2021       Labs due:  11/2022    Next appt: 2/3/2022    RX pended

## 2022-01-04 ENCOUNTER — PATIENT MESSAGE (OUTPATIENT)
Dept: GASTROENTEROLOGY | Facility: CLINIC | Age: 57
End: 2022-01-04
Payer: COMMERCIAL

## 2022-01-04 RX ORDER — AZATHIOPRINE 50 MG/1
50 TABLET ORAL DAILY
Qty: 14 TABLET | Refills: 0 | Status: SHIPPED | OUTPATIENT
Start: 2022-01-04 | End: 2022-02-03

## 2022-01-04 RX ORDER — MESALAMINE 0.38 G/1
1.5 CAPSULE, EXTENDED RELEASE ORAL DAILY
Qty: 56 CAPSULE | Refills: 0 | Status: SHIPPED | OUTPATIENT
Start: 2022-01-04 | End: 2022-04-19 | Stop reason: SDUPTHER

## 2022-01-04 RX ORDER — AZATHIOPRINE 50 MG/1
50 TABLET ORAL DAILY
Qty: 90 TABLET | Refills: 1 | Status: SHIPPED | OUTPATIENT
Start: 2022-01-04 | End: 2022-06-14

## 2022-01-04 NOTE — TELEPHONE ENCOUNTER
Attempted to contact [facility] at [phone]  - No options to speak to a representative to correct fax number

## 2022-01-04 NOTE — TELEPHONE ENCOUNTER
Called & spoke to Darleen proctor/ [facility]  - They do not have mesalamine in stock  - They offered to transfer medication to an alternative pharmacy that has the medication in stock

## 2022-01-05 ENCOUNTER — PATIENT MESSAGE (OUTPATIENT)
Dept: GASTROENTEROLOGY | Facility: CLINIC | Age: 57
End: 2022-01-05
Payer: COMMERCIAL

## 2022-01-05 ENCOUNTER — PATIENT MESSAGE (OUTPATIENT)
Dept: RESEARCH | Facility: HOSPITAL | Age: 57
End: 2022-01-05
Payer: COMMERCIAL

## 2022-01-05 NOTE — TELEPHONE ENCOUNTER
Called & spoke to pt  - Received the following phone number for [facility]: [phone]; will call in an attempt to confirm they have the correct contact information   - Pt concerned about being out of g. Apriso x 2 days  - Informed pt that [facility] has g. Apriso in stock if she would like the prescription to be sent there  - Pt states she will likely need something local unless her  is able to  RX from [facility] today  - Pt stressed that she is unable to call multiple local pharmacies to see who has her medication in stock  - Informed pt that it is her responsibility to find a pharmacy with her medication in stock  - Pt to send portal if she wants her RX transferred to [facility]  - All questions answered

## 2022-01-05 NOTE — TELEPHONE ENCOUNTER
Called & spoke to Keven with [facility]  - Confirmed incorrect fax & phone number on file  - Keven states contact information will need to be corrected by contacting [employer] Board at [phone]  - Will have contact information updated

## 2022-01-06 NOTE — TELEPHONE ENCOUNTER
Called [facility] and spoke with Lena  - Confirmed fax number on file for our office was incorrect   - Representative reported updating contact information with the correct fax number provided.

## 2022-02-03 ENCOUNTER — OFFICE VISIT (OUTPATIENT)
Dept: GASTROENTEROLOGY | Facility: CLINIC | Age: 57
End: 2022-02-03
Payer: COMMERCIAL

## 2022-02-03 VITALS
HEART RATE: 100 BPM | WEIGHT: 120.63 LBS | HEIGHT: 62 IN | DIASTOLIC BLOOD PRESSURE: 87 MMHG | OXYGEN SATURATION: 100 % | SYSTOLIC BLOOD PRESSURE: 125 MMHG | BODY MASS INDEX: 22.2 KG/M2 | TEMPERATURE: 98 F

## 2022-02-03 DIAGNOSIS — K51.00 ULCERATIVE PANCOLITIS WITHOUT COMPLICATION: Primary | ICD-10-CM

## 2022-02-03 PROCEDURE — 99215 OFFICE O/P EST HI 40 MIN: CPT | Mod: S$GLB,,, | Performed by: INTERNAL MEDICINE

## 2022-02-03 PROCEDURE — 99215 PR OFFICE/OUTPT VISIT, EST, LEVL V, 40-54 MIN: ICD-10-PCS | Mod: S$GLB,,, | Performed by: INTERNAL MEDICINE
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
## 2022-02-03 NOTE — PATIENT INSTRUCTIONS
- labs 11 am on 2/15 and schedule PCV 13 same day  - colonoscopy 6/2022- call first week of 4/2022 to schedule 504-  - set reminder for colonoscopy

## 2022-04-19 ENCOUNTER — PATIENT MESSAGE (OUTPATIENT)
Dept: GASTROENTEROLOGY | Facility: CLINIC | Age: 57
End: 2022-04-19
Payer: COMMERCIAL

## 2022-04-19 ENCOUNTER — TELEPHONE (OUTPATIENT)
Dept: GASTROENTEROLOGY | Facility: CLINIC | Age: 57
End: 2022-04-19
Payer: COMMERCIAL

## 2022-04-19 DIAGNOSIS — K51.00 ULCERATIVE PANCOLITIS: ICD-10-CM

## 2022-04-19 RX ORDER — MESALAMINE 0.38 G/1
1.5 CAPSULE, EXTENDED RELEASE ORAL DAILY
Qty: 56 CAPSULE | Refills: 0 | Status: SHIPPED | OUTPATIENT
Start: 2022-04-19 | End: 2022-05-03

## 2022-04-19 RX ORDER — MESALAMINE 0.38 G/1
1.5 CAPSULE, EXTENDED RELEASE ORAL DAILY
Qty: 360 CAPSULE | Refills: 3 | Status: SHIPPED | OUTPATIENT
Start: 2022-04-19 | End: 2023-04-03

## 2022-04-19 NOTE — TELEPHONE ENCOUNTER
Refill request for Stephan     Allergies reviewed     Drug Monitoring labs:  CBC and CMP yearly     Lab Results   Component Value Date    HEPBSAG Negative 11/10/2017    HEPBCAB Negative 11/10/2017     No results found for: TBGOLDPLUS  Lab Results   Component Value Date    [id number] 90 10/14/2021     Lab Results   Component Value Date    WBC 3.82 (L) 11/30/2021    HGB 13.8 11/30/2021    HCT 42.7 11/30/2021    MCV 90 11/30/2021     11/30/2021     Lab Results   Component Value Date    CREATININE 0.82 11/30/2021    ALBUMIN 4.6 11/30/2021    BILITOT 0.7 11/30/2021    ALKPHOS 69 11/30/2021    AST 20 11/30/2021    ALT 12 11/30/2021       Labs due:  11/2022    Next appt: 6/30/2022    RX pended

## 2022-04-19 NOTE — TELEPHONE ENCOUNTER
----- Message from Helena Philippe RN sent at 2/3/2022 11:31 AM CST -----  - colonoscopy 6/2022- call first week of 4/2022 to schedule 504-  - set reminder for colonoscopy

## 2022-04-22 ENCOUNTER — PATIENT MESSAGE (OUTPATIENT)
Dept: GASTROENTEROLOGY | Facility: CLINIC | Age: 57
End: 2022-04-22
Payer: COMMERCIAL

## 2022-04-26 ENCOUNTER — TELEPHONE (OUTPATIENT)
Dept: GASTROENTEROLOGY | Facility: CLINIC | Age: 57
End: 2022-04-26
Payer: COMMERCIAL

## 2022-04-26 ENCOUNTER — PATIENT MESSAGE (OUTPATIENT)
Dept: GASTROENTEROLOGY | Facility: CLINIC | Age: 57
End: 2022-04-26
Payer: COMMERCIAL

## 2022-04-27 ENCOUNTER — PATIENT MESSAGE (OUTPATIENT)
Dept: GASTROENTEROLOGY | Facility: CLINIC | Age: 57
End: 2022-04-27
Payer: COMMERCIAL

## 2022-05-30 ENCOUNTER — PATIENT MESSAGE (OUTPATIENT)
Dept: ADMINISTRATIVE | Facility: HOSPITAL | Age: 57
End: 2022-05-30
Payer: COMMERCIAL

## 2022-06-13 DIAGNOSIS — K51.011 ULCERATIVE PANCOLITIS WITH RECTAL BLEEDING: ICD-10-CM

## 2022-06-13 DIAGNOSIS — Z79.899 HIGH RISK MEDICATION USE: ICD-10-CM

## 2022-06-14 RX ORDER — AZATHIOPRINE 50 MG/1
TABLET ORAL
Qty: 90 TABLET | Refills: 0 | Status: SHIPPED | OUTPATIENT
Start: 2022-06-14 | End: 2022-06-17

## 2022-06-17 RX ORDER — AZATHIOPRINE 50 MG/1
50 TABLET ORAL DAILY
Qty: 90 TABLET | Refills: 0 | Status: SHIPPED | OUTPATIENT
Start: 2022-06-17 | End: 2022-12-18

## 2022-06-27 ENCOUNTER — TELEPHONE (OUTPATIENT)
Dept: GASTROENTEROLOGY | Facility: CLINIC | Age: 57
End: 2022-06-27
Payer: COMMERCIAL

## 2022-06-27 ENCOUNTER — PATIENT MESSAGE (OUTPATIENT)
Dept: GASTROENTEROLOGY | Facility: CLINIC | Age: 57
End: 2022-06-27
Payer: COMMERCIAL

## 2022-07-11 ENCOUNTER — PATIENT MESSAGE (OUTPATIENT)
Dept: ADMINISTRATIVE | Facility: HOSPITAL | Age: 57
End: 2022-07-11
Payer: COMMERCIAL

## 2022-08-03 ENCOUNTER — TELEPHONE (OUTPATIENT)
Dept: GASTROENTEROLOGY | Facility: CLINIC | Age: 57
End: 2022-08-03
Payer: COMMERCIAL

## 2022-08-03 NOTE — TELEPHONE ENCOUNTER
Called patient to confirm appointment for Monday   -pt requested to cancel appt   -would like to schedule colonoscopy   -schedule f/u with Dr Walsh after scope   -pt was transferred to endo scheduling team

## 2022-08-04 ENCOUNTER — TELEPHONE (OUTPATIENT)
Dept: ENDOSCOPY | Facility: HOSPITAL | Age: 57
End: 2022-08-04
Payer: COMMERCIAL

## 2022-08-04 DIAGNOSIS — K51.00 ULCERATIVE PANCOLITIS WITHOUT COMPLICATION: Primary | ICD-10-CM

## 2022-08-04 NOTE — TELEPHONE ENCOUNTER
Returned patient's voicemail to endoscopy main line. No answer. Left message for patient to call Endoscopy Scheduling to schedule for a colonoscopy. Phone number provided.

## 2022-08-08 ENCOUNTER — TELEPHONE (OUTPATIENT)
Dept: ORTHOPEDICS | Facility: CLINIC | Age: 57
End: 2022-08-08
Payer: COMMERCIAL

## 2022-08-08 DIAGNOSIS — M79.642 LEFT HAND PAIN: Primary | ICD-10-CM

## 2022-08-08 NOTE — TELEPHONE ENCOUNTER
Spoke c pt to confirm appt location & time c Dr. Regalado 08/16/22. Pt stated she is glad that we called because she needs to r/s., but that she would call us back to do so as she was in the middle of something at the moment. informed pt that we would also need to schedule x-rays that are needed prior to the appt. Pt expressed understanding & was thankful.

## 2022-08-10 ENCOUNTER — PATIENT MESSAGE (OUTPATIENT)
Dept: INTERNAL MEDICINE | Facility: CLINIC | Age: 57
End: 2022-08-10
Payer: COMMERCIAL

## 2022-08-22 ENCOUNTER — PATIENT MESSAGE (OUTPATIENT)
Dept: GASTROENTEROLOGY | Facility: CLINIC | Age: 57
End: 2022-08-22
Payer: COMMERCIAL

## 2022-08-22 ENCOUNTER — PATIENT MESSAGE (OUTPATIENT)
Dept: INTERNAL MEDICINE | Facility: CLINIC | Age: 57
End: 2022-08-22
Payer: COMMERCIAL

## 2022-08-22 DIAGNOSIS — Z01.419 WELL WOMAN EXAM: ICD-10-CM

## 2022-08-22 DIAGNOSIS — Z12.31 SCREENING MAMMOGRAM, ENCOUNTER FOR: Primary | ICD-10-CM

## 2022-08-22 RX ORDER — ESTRADIOL 1 MG/1
1 TABLET ORAL DAILY
Qty: 90 TABLET | Refills: 0 | Status: SHIPPED | OUTPATIENT
Start: 2022-08-22 | End: 2022-10-10 | Stop reason: SDUPTHER

## 2022-09-01 ENCOUNTER — TELEPHONE (OUTPATIENT)
Dept: ENDOSCOPY | Facility: HOSPITAL | Age: 57
End: 2022-09-01
Payer: COMMERCIAL

## 2022-09-01 DIAGNOSIS — K51.00 ULCERATIVE PANCOLITIS: Primary | ICD-10-CM

## 2022-09-21 ENCOUNTER — PATIENT MESSAGE (OUTPATIENT)
Dept: INTERNAL MEDICINE | Facility: CLINIC | Age: 57
End: 2022-09-21
Payer: COMMERCIAL

## 2022-10-10 ENCOUNTER — OFFICE VISIT (OUTPATIENT)
Dept: INTERNAL MEDICINE | Facility: CLINIC | Age: 57
End: 2022-10-10
Payer: COMMERCIAL

## 2022-10-10 VITALS
TEMPERATURE: 98 F | HEART RATE: 68 BPM | WEIGHT: 125.69 LBS | BODY MASS INDEX: 23.13 KG/M2 | SYSTOLIC BLOOD PRESSURE: 136 MMHG | HEIGHT: 62 IN | DIASTOLIC BLOOD PRESSURE: 84 MMHG | OXYGEN SATURATION: 99 %

## 2022-10-10 DIAGNOSIS — I10 ESSENTIAL HYPERTENSION: ICD-10-CM

## 2022-10-10 DIAGNOSIS — D72.819 LEUKOPENIA, UNSPECIFIED TYPE: ICD-10-CM

## 2022-10-10 DIAGNOSIS — D84.9 IMMUNOSUPPRESSED STATUS: ICD-10-CM

## 2022-10-10 DIAGNOSIS — K51.00 ULCERATIVE PANCOLITIS WITHOUT COMPLICATION: ICD-10-CM

## 2022-10-10 DIAGNOSIS — Z00.00 ANNUAL PHYSICAL EXAM: Primary | ICD-10-CM

## 2022-10-10 PROBLEM — F41.9 ANXIETY: Status: RESOLVED | Noted: 2017-03-09 | Resolved: 2022-10-10

## 2022-10-10 PROCEDURE — 99999 PR PBB SHADOW E&M-EST. PATIENT-LVL IV: ICD-10-PCS | Mod: PBBFAC,,, | Performed by: INTERNAL MEDICINE

## 2022-10-10 PROCEDURE — 99396 PREV VISIT EST AGE 40-64: CPT | Mod: S$GLB,,, | Performed by: INTERNAL MEDICINE

## 2022-10-10 PROCEDURE — 99396 PR PREVENTIVE VISIT,EST,40-64: ICD-10-PCS | Mod: S$GLB,,, | Performed by: INTERNAL MEDICINE

## 2022-10-10 PROCEDURE — 99999 PR PBB SHADOW E&M-EST. PATIENT-LVL IV: CPT | Mod: PBBFAC,,, | Performed by: INTERNAL MEDICINE

## 2022-10-10 RX ORDER — ESTRADIOL 1 MG/1
1 TABLET ORAL DAILY
Qty: 90 TABLET | Refills: 3 | Status: SHIPPED | OUTPATIENT
Start: 2022-10-10 | End: 2022-11-08

## 2022-10-10 RX ORDER — AMLODIPINE BESYLATE 5 MG/1
5 TABLET ORAL DAILY
Qty: 90 TABLET | Refills: 3 | Status: SHIPPED | OUTPATIENT
Start: 2022-10-10 | End: 2023-12-06 | Stop reason: SDUPTHER
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
## 2022-10-20 ENCOUNTER — OFFICE VISIT (OUTPATIENT)
Dept: OBSTETRICS AND GYNECOLOGY | Facility: CLINIC | Age: 57
End: 2022-10-20
Payer: COMMERCIAL

## 2022-10-20 VITALS
SYSTOLIC BLOOD PRESSURE: 108 MMHG | DIASTOLIC BLOOD PRESSURE: 64 MMHG | WEIGHT: 122 LBS | HEIGHT: 62 IN | BODY MASS INDEX: 22.45 KG/M2

## 2022-10-20 DIAGNOSIS — Z01.419 ENCNTR FOR GYN EXAM (GENERAL) (ROUTINE) W/O ABN FINDINGS: Primary | ICD-10-CM

## 2022-10-20 PROCEDURE — 99999 PR PBB SHADOW E&M-EST. PATIENT-LVL II: ICD-10-PCS | Mod: PBBFAC,,, | Performed by: STUDENT IN AN ORGANIZED HEALTH CARE EDUCATION/TRAINING PROGRAM

## 2022-10-20 PROCEDURE — 99386 PR PREVENTIVE VISIT,NEW,40-64: ICD-10-PCS | Mod: S$GLB,,, | Performed by: STUDENT IN AN ORGANIZED HEALTH CARE EDUCATION/TRAINING PROGRAM

## 2022-10-20 PROCEDURE — 99999 PR PBB SHADOW E&M-EST. PATIENT-LVL II: CPT | Mod: PBBFAC,,, | Performed by: STUDENT IN AN ORGANIZED HEALTH CARE EDUCATION/TRAINING PROGRAM

## 2022-10-20 PROCEDURE — 99386 PREV VISIT NEW AGE 40-64: CPT | Mod: S$GLB,,, | Performed by: STUDENT IN AN ORGANIZED HEALTH CARE EDUCATION/TRAINING PROGRAM

## 2022-10-20 NOTE — PROGRESS NOTES
Subjective:    Patient ID: Ansley Bautista is a 57 y.o. y.o. female.     Chief Complaint: Annual Well Woman Exam     History of Present Illness:  Ansley presents today for Annual Well Woman exam. She describes her menses as  absent. She is status post supracervical hysterectomy .She denies pelvic pain.  She denies breast tenderness, masses, nipple discharge. She admits to difficulty with leakage of urine. She denies menopausal symptoms such as hotflashes, vaginal dryness, and night sweats. She denies bloating, early satiety, or weight changes. She is sexually active.     Menstrual History:   No LMP recorded. Patient has had a hysterectomy..     OB History          3    Para   3    Term   3            AB        Living             SAB        IAB        Ectopic        Multiple        Live Births                     The following portions of the patient's history were reviewed and updated as appropriate: allergies, current medications, past family history, past medical history, past social history, past surgical history, and problem list.    ROS:   CONSTITUTIONAL: Negative for fever, chills, diaphoresis, weakness, fatigue, weight loss, weight gain  ENT: negative for sore throat, nasal congestion, nasal discharge, epistaxis, tinnitus, hearing loss  EYES: negative for blurry vision, decreased vision, loss of vision, eye pain, diplopia, photophobia, discharge  SKIN: Negative for rash, itching, hives  RESPIRATORY: negative for cough, hemoptysis, shortness of breath, pleuritic chest pain, wheezing  CARDIOVASCULAR: negative for chest pain, dyspnea on exertion, orthopnea, paroxysmal nocturnal dyspnea, edema, palpitations  BREAST: negative for breast  tenderness, breast mass, nipple discharge, or skin changes  GASTROINTESTINAL: negative for abdominal pain, flank pain, nausea, vomiting, diarrhea, constipation, black stool, blood in stool  GENITOURINARY: negative for abnormal vaginal bleeding, amenorrhea,  decreased libido, dysuria, genital sores, hematuria, incontinence, menorrhagia, pelvic pain, urinary frequency, vaginal discharge  HEMATOLOGIC/LYMPHATIC: negative for swollen lymph nodes, bleeding, bruising  MUSCULOSKELETAL: negative for back pain, joint pain, joint stiffness, joint swelling, muscle pain, muscle weakness  NEUROLOGICAL: negative for dizzy/vertigo, headache, focal weakness, numbness/tingling, speech problems, loss of consciousness, confusion, memory loss  BEHAVORIAL/PSYCH: negative for anxiety, depression, psychosis  ENDOCRINE: negative for polydipsia/polyuria, palpitations, skin changes, temperature intolerance, unexpected weight changes  ALLERGIC/IMMUNOLOGIC: negative for urticaria, hay fever, angioedema      Objective:    Vital Signs:  Vitals:    10/20/22 1344   BP: 108/64       Physical Exam:  General:  alert, cooperative, no distress   Skin:  Skin color, texture, turgor normal. No rashes or lesions   HEENT:  conjunctivae/corneas clear. PERRL.   Neck: supple, trachea midline, no adenopathy or thyromegally   Respiratory:  Normal effort   Breasts:  no discharge, erythema, tenderness, or palpable masses; no axillary lymphadenopathy   Abdomen:  soft, nontender, no palpable masses   Pelvis: External genitalia: normal general appearance  Urinary system: urethral meatus normal, bladder nontender  Vaginal: normal mucosa without prolapse or lesions  Cervix: normal appearance  Uterus: absent, removed surgically  Adnexa: normal size, nontender bilaterally   Extremities: Normal ROM; no edema, no cyanosis   Neurologial: Normal strength and tone. No focal numbness or weakness.   Psychiatric: normal mood, speech, dress, and thought processes       Assessment:       Healthy female exam.     1. Encntr for gyn exam (general) (routine) w/o abn findings          Plan:      Problem List Items Addressed This Visit    None  Visit Diagnoses       Encntr for gyn exam (general) (routine) w/o abn findings    -  Primary             COUNSELING:  Ansley was counseled on [facility]  recommendations for yearly pelvic exams in addition to recommendations for monthly self breast exams; to see her PCP for other health maintenance.

## 2022-11-19 DIAGNOSIS — K51.00 ULCERATIVE PANCOLITIS WITHOUT COMPLICATION: Primary | ICD-10-CM

## 2022-12-02 ENCOUNTER — PATIENT MESSAGE (OUTPATIENT)
Dept: INTERNAL MEDICINE | Facility: CLINIC | Age: 57
End: 2022-12-02
Payer: COMMERCIAL

## 2022-12-02 RX ORDER — OSELTAMIVIR PHOSPHATE 75 MG/1
75 CAPSULE ORAL DAILY
Qty: 10 CAPSULE | Refills: 0 | Status: SHIPPED | OUTPATIENT
Start: 2022-12-02 | End: 2022-12-07

## 2022-12-18 DIAGNOSIS — K51.00 ULCERATIVE PANCOLITIS WITHOUT COMPLICATION: Primary | ICD-10-CM

## 2022-12-18 RX ORDER — AZATHIOPRINE 50 MG/1
50 TABLET ORAL DAILY
Qty: 30 TABLET | Refills: 0 | Status: SHIPPED | OUTPATIENT
Start: 2022-12-18 | End: 2023-01-17

## 2022-12-19 NOTE — TELEPHONE ENCOUNTER
Called and left message for pt to call back   Mychart message sent      Erica Gamboa is a 40 y.o.  male and presents with    Chief Complaint   Patient presents with    Leg Pain    Medication Refill     Subjective:  Mr. Stephanie Palumbo presents for medication evaluation of chronic pain. He had 2 episodes of fainting with his blood pressure changing. He has had low blood pressure since his last visit; he has used 1/2 dose of amlidipine but continues to take enalipril twice a day     Osteoarthritis and Chronic Pain:  He has neuropathy and orthopedic, primarily affecting the legs and feet and hip and hand on the right side  Symptoms onset: problem is longstanding. Rheumatological ROS: ongoing significant pain in legs and feet and hip which is stable and controlled by PRN meds. Response to treatment plan: gradually worsening.    Cardiovascular Review:  The patient has hypertension, hyperlipidemia, CHF and obesity. Diet and [place] attempting to follow a low fat, low cholesterol diet, not attempting to follow a low sodium diet, does not rigorously follow a diabetic diet, sedentary, nonsmoker  Home BP Monitoring: is not measured at home. Pertinent ROS: taking medications as instructed, no medication side effects noted, no TIA's, no chest pain on exertion, no dyspnea on exertion, no swelling of ankles.      Thyroid Review:  Patient is seen for followup of hypothyroidism.    Thyroid ROS: denies fatigue, weight changes, heat/cold intolerance, bowel/skin changes or CVS symptoms.     Additional Concerns: he has been taking antabuse for alcoholism but the medication did not effect him when he drank.       ROS   General ROS: negative for - chills or fever  Psychological ROS: negative for - anxiety or depression  Ophthalmic ROS: negative for - blurry vision  ENT ROS: negative for - headaches, nasal congestion or sore throat  Endocrine ROS: negative for - polydipsia/polyuria or temperature intolerance  Respiratory ROS: no cough, shortness of breath, or wheezing  Cardiovascular ROS: no chest pain or dyspnea on exertion  Gastrointestinal ROS: no abdominal pain, change in bowel habits, or black or bloody stools  Genito-Urinary ROS: no dysuria, trouble voiding, or hematuria  Neurological ROS: positive for - numbness/tingling  - lightheadedness     All other systems reviewed and are negative      Objective:  Vitals:    03/11/20 1009   BP: 130/90   Pulse: 66   Resp: 18   Temp: 96.7 °F (35.9 °C)   TempSrc: Oral   SpO2: 98%   Weight: 279 lb 12.8 oz (126.9 kg)   Height: 6' 2\" (1.88 m)   PainSc:   6   PainLoc: Leg       alert, well appearing, and in no distress, oriented to person, place, and time and obese  Mental status - normal mood, behavior, speech, dress, motor activity, and thought processes  Chest - clear to auscultation, no wheezes, rales or rhonchi, symmetric air entry  Heart - normal rate, regular rhythm, normal S1, S2, no murmurs, rubs, clicks or gallops  Abdomen - left side inguinal hernia; reducible  Musculoskeletal -  abnormal exam of left hip; left elbow with edema, bogginess and erythema  Extremities - peripheral pulses normal, no pedal edema, no clubbing or cyanosis  Skin - excess abdominal skin  Neuro - antalgic gait    Assessment/Plan:    1. Olecranon bursitis of left elbow  Continue nsaid and compression    2. Cardiomyopathy, unspecified type ([address]  Continue current medications    3. Acquired hypothyroidism  Therapeutic dose    4. Hypertension, unspecified type  Goal <130/80;     5. Syncope, unspecified syncope type  Avoid standing quickly; encourage hydration    6. Chronic, continuous use of opioids  This is a chronic problem that is worsened. Per review of available records and patients , there are not sign of overuse, misuse, diversion, or concerning side effects.  Today we reviewed: the risk of overdose, addiction, and dependency proper storage and disposal of medications the goals of treatment (improve functionality, quality of life, and pain) The following changes were made to the patients current treatment plan: nothing, medications refilled. - oxyCODONE-acetaminophen (PERCOCET) 5-325 mg per tablet; Take 1 Tab by mouth every six (6) hours as needed for Pain for up to 30 days. Max Daily Amount: 4 Tabs. Dispense: 60 Tab; Refill: 0  - IN PT  ABOUT PSYCHOSOCIAL AND PHARM OPTIONS OPIOID ADDICTION    7. Chronic pain syndrome    - oxyCODONE-acetaminophen (PERCOCET) 5-325 mg per tablet; Take 1 Tab by mouth every six (6) hours as needed for Pain for up to 30 days. Max Daily Amount: 4 Tabs. Dispense: 60 Tab; Refill: 0    8. Alcoholism ([address]  Encourage AA     9. Obesity, morbid ([facility] [address]  I have reviewed/discussed the above normal BMI with the patient. I have recommended the following interventions: dietary management education, guidance, and counseling and encourage exercise . Marissa Alvarenga Lab review: no lab studies available for review at time of visit      I have discussed the diagnosis with the patient and the intended plan as seen in the above orders. The patient has received an after-visit summary and questions were answered concerning future plans. I have discussed medication side effects and warnings with the patient as well. I have reviewed the plan of care with the patient, accepted their input and they are in agreement with the treatment goals.

## 2022-12-19 NOTE — TELEPHONE ENCOUNTER
----- Message from Mike Walsh MD sent at 12/18/2022  9:42 AM CST -----  She is way overdue for labs, was due in 9/2022  On imuran and gets labs every 3 mos.   I have refilled imuran only for 1 month so she can get labs asap, please call her    SS

## 2022-12-20 NOTE — TELEPHONE ENCOUNTER
Called and spoke to patient   -discuss Dr Walsh message below   -pt expressed understanding   -all questions and concerns were answered   -lab appt schedule for Thursday at 8:00

## 2022-12-23 ENCOUNTER — TELEPHONE (OUTPATIENT)
Dept: GASTROENTEROLOGY | Facility: CLINIC | Age: 57
End: 2022-12-23
Payer: COMMERCIAL

## 2022-12-23 NOTE — TELEPHONE ENCOUNTER
Called & spoke to pt  - Reviewed plan for labs q 3 months d/t Imuran use & f/u appt  - 3/2023 & 6/2023 labs scheduled  - F/u scheduled 7/3/23

## 2022-12-23 NOTE — TELEPHONE ENCOUNTER
----- Message from Mike Walsh MD sent at 12/23/2022  3:09 AM CST -----  Regarding: labs and followup  On imuran   CBC/CMP q 3 mos x 2- standing orders in  Last seen 2/2022 and was supposed to see me 7/2022 but never seen  Needs routine next follow up please with me    SS

## 2023-01-13 ENCOUNTER — CLINICAL SUPPORT (OUTPATIENT)
Dept: ENDOSCOPY | Facility: HOSPITAL | Age: 58
End: 2023-01-13
Attending: INTERNAL MEDICINE
Payer: COMMERCIAL

## 2023-01-13 VITALS — WEIGHT: 122 LBS | BODY MASS INDEX: 22.45 KG/M2 | HEIGHT: 62 IN

## 2023-01-13 DIAGNOSIS — K51.00 ULCERATIVE PANCOLITIS: ICD-10-CM

## 2023-01-13 NOTE — PLAN OF CARE
Endoscopy procedure scheduled, prep instructions reviewed and reiterated numerous times that this is what is asked.

## 2023-03-20 ENCOUNTER — HOSPITAL ENCOUNTER (OUTPATIENT)
Facility: HOSPITAL | Age: 58
Discharge: HOME | End: 2023-03-20
Attending: INTERNAL MEDICINE | Admitting: INTERNAL MEDICINE
Payer: COMMERCIAL

## 2023-03-20 ENCOUNTER — ANESTHESIA (OUTPATIENT)
Dept: ENDOSCOPY | Facility: HOSPITAL | Age: 58
End: 2023-03-20
Payer: COMMERCIAL

## 2023-03-20 ENCOUNTER — ANESTHESIA EVENT (OUTPATIENT)
Dept: ENDOSCOPY | Facility: HOSPITAL | Age: 58
End: 2023-03-20
Payer: COMMERCIAL

## 2023-03-20 VITALS
TEMPERATURE: 98 F | DIASTOLIC BLOOD PRESSURE: 80 MMHG | OXYGEN SATURATION: 99 % | RESPIRATION RATE: 18 BRPM | BODY MASS INDEX: 22.82 KG/M2 | HEART RATE: 60 BPM | WEIGHT: 124 LBS | HEIGHT: 62 IN | SYSTOLIC BLOOD PRESSURE: 157 MMHG

## 2023-03-20 DIAGNOSIS — K51.00 ULCERATIVE PANCOLITIS: ICD-10-CM

## 2023-03-20 DIAGNOSIS — K51.00 ULCERATIVE PANCOLITIS WITHOUT COMPLICATION: Primary | ICD-10-CM

## 2023-03-20 PROCEDURE — 88305 TISSUE EXAM BY PATHOLOGIST: CPT | Mod: 59 | Performed by: PATHOLOGY

## 2023-03-20 PROCEDURE — 37000008 HC ANESTHESIA 1ST 15 MINUTES: Performed by: INTERNAL MEDICINE

## 2023-03-20 PROCEDURE — 45381 COLONOSCOPY SUBMUCOUS NJX: CPT | Mod: 33,51,, | Performed by: INTERNAL MEDICINE

## 2023-03-20 PROCEDURE — 45380 COLONOSCOPY AND BIOPSY: CPT | Mod: 33,59,, | Performed by: INTERNAL MEDICINE

## 2023-03-20 PROCEDURE — 25000003 PHARM REV CODE 250: Performed by: NURSE ANESTHETIST, CERTIFIED REGISTERED

## 2023-03-20 PROCEDURE — 27201089 HC SNARE, DISP (ANY): Performed by: INTERNAL MEDICINE

## 2023-03-20 PROCEDURE — E9220 PRA ENDO ANESTHESIA: HCPCS | Mod: 33,,, | Performed by: NURSE ANESTHETIST, CERTIFIED REGISTERED

## 2023-03-20 PROCEDURE — 45381 PR COLONOSCPY,FLEX,W/DIR SUBMUC INJECT: ICD-10-PCS | Mod: 33,51,, | Performed by: INTERNAL MEDICINE

## 2023-03-20 PROCEDURE — 45380 COLONOSCOPY AND BIOPSY: CPT | Mod: PT,59 | Performed by: INTERNAL MEDICINE

## 2023-03-20 PROCEDURE — 45380 PR COLONOSCOPY,BIOPSY: ICD-10-PCS | Mod: 33,59,, | Performed by: INTERNAL MEDICINE

## 2023-03-20 PROCEDURE — 88305 TISSUE EXAM BY PATHOLOGIST: ICD-10-PCS | Mod: 26,,, | Performed by: PATHOLOGY

## 2023-03-20 PROCEDURE — 45385 PR COLONOSCOPY,REMV LESN,SNARE: ICD-10-PCS | Mod: 33,,, | Performed by: INTERNAL MEDICINE

## 2023-03-20 PROCEDURE — 45381 COLONOSCOPY SUBMUCOUS NJX: CPT | Mod: PT | Performed by: INTERNAL MEDICINE

## 2023-03-20 PROCEDURE — 88305 TISSUE EXAM BY PATHOLOGIST: CPT | Mod: 26,,, | Performed by: PATHOLOGY

## 2023-03-20 PROCEDURE — 27201012 HC FORCEPS, HOT/COLD, DISP: Performed by: INTERNAL MEDICINE

## 2023-03-20 PROCEDURE — 27202363 HC INJECTION AGENT, SUBMUCOSAL, ANY: Performed by: INTERNAL MEDICINE

## 2023-03-20 PROCEDURE — 37000009 HC ANESTHESIA EA ADD 15 MINS: Performed by: INTERNAL MEDICINE

## 2023-03-20 PROCEDURE — 45385 COLONOSCOPY W/LESION REMOVAL: CPT | Mod: 33,,, | Performed by: INTERNAL MEDICINE

## 2023-03-20 PROCEDURE — 45385 COLONOSCOPY W/LESION REMOVAL: CPT | Mod: PT | Performed by: INTERNAL MEDICINE

## 2023-03-20 PROCEDURE — 63600175 PHARM REV CODE 636 W HCPCS: Performed by: NURSE ANESTHETIST, CERTIFIED REGISTERED

## 2023-03-20 PROCEDURE — E9220 PRA ENDO ANESTHESIA: ICD-10-PCS | Mod: 33,,, | Performed by: NURSE ANESTHETIST, CERTIFIED REGISTERED

## 2023-03-20 RX ORDER — PROPOFOL 10 MG/ML
VIAL (ML) INTRAVENOUS
Status: DISCONTINUED | OUTPATIENT
Start: 2023-03-20 | End: 2023-03-20

## 2023-03-20 RX ORDER — LIDOCAINE HYDROCHLORIDE 20 MG/ML
INJECTION INTRAVENOUS
Status: DISCONTINUED | OUTPATIENT
Start: 2023-03-20 | End: 2023-03-20

## 2023-03-20 RX ORDER — PROPOFOL 10 MG/ML
VIAL (ML) INTRAVENOUS CONTINUOUS PRN
Status: DISCONTINUED | OUTPATIENT
Start: 2023-03-20 | End: 2023-03-20

## 2023-03-20 RX ORDER — SODIUM CHLORIDE 9 MG/ML
INJECTION, SOLUTION INTRAVENOUS CONTINUOUS
Status: DISCONTINUED | OUTPATIENT
Start: 2023-03-20 | End: 2023-03-20 | Stop reason: HOSPADM

## 2023-03-20 RX ADMIN — LIDOCAINE HYDROCHLORIDE 30 MG: 20 INJECTION INTRAVENOUS at 08:03

## 2023-03-20 RX ADMIN — SODIUM CHLORIDE: 0.9 INJECTION, SOLUTION INTRAVENOUS at 08:03

## 2023-03-20 RX ADMIN — Medication 150 MCG/KG/MIN: at 08:03

## 2023-03-20 RX ADMIN — PROPOFOL 60 MG: 10 INJECTION, EMULSION INTRAVENOUS at 08:03
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
## 2023-03-20 NOTE — TRANSFER OF CARE
"Anesthesia Transfer of Care Note    Patient: Ansley Bautista    Procedure(s) Performed: Procedure(s) (LRB):  COLONOSCOPY (N/A)    Patient location: PACU    Anesthesia Type: general    Transport from OR: Transported from OR on room air with adequate spontaneous ventilation    Post pain: adequate analgesia    Post assessment: no apparent anesthetic complications and tolerated procedure well    Post vital signs: stable    Level of consciousness: awake, alert and oriented    Nausea/Vomiting: no nausea/vomiting    Complications: none    Transfer of care protocol was followed      Last vitals:   Visit Vitals  BP (!) 154/67(BP Location: Left arm, Patient Position: Lying)   Pulse 78   Temp 97.5   Resp 16   Ht 5' 2" (1.575 m)   Wt 56.2 kg (124 lb)   SpO2 99%   Breastfeeding No   BMI 22.68 kg/m²     "
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
## 2023-03-20 NOTE — ANESTHESIA POSTPROCEDURE EVALUATION
Anesthesia Post Evaluation    Patient: Ansley Bautista    Procedure(s) Performed: Procedure(s) (LRB):  COLONOSCOPY (N/A)    Final Anesthesia Type: general      Patient location during evaluation: PACU  Patient participation: Yes- Able to Participate  Level of consciousness: awake and alert and awake  Post-procedure vital signs: reviewed and stable  Pain management: adequate  Airway patency: patent    PONV status at discharge: No PONV  Anesthetic complications: no      Cardiovascular status: blood pressure returned to baseline  Respiratory status: unassisted and spontaneous ventilation  Hydration status: euvolemic  Follow-up not needed.          Vitals Value Taken Time   /80 03/20/23 0953   Temp 36.5 °C (97.7 °F) 03/20/23 0922   Pulse 60 03/20/23 0953   Resp 18 03/20/23 0953   SpO2 99 % 03/20/23 0953         Event Time   Out of Recovery 10:01:15         Pain/Starr Score: Starr Score: 9 (3/20/2023  9:53 AM)

## 2023-03-20 NOTE — [employer] PATIENT INSTRUCTIONS
Discharge Summary/Instructions after an Endoscopic Procedure  Patient Name: Ansley Bautista  Patient MRN: [id number]  Patient YOB: 1965 Monday, March 20, 2023  Mike Walsh MD  Dear patient,  As a result of recent federal legislation (The Federal Cures Act), you may   receive lab or pathology results from your procedure in your [facility]   account before your physician is able to contact you. Your physician or   their representative will relay the results to you with their   recommendations at their soonest availability.  Thank you,  RESTRICTIONS:  During your procedure today, you received medications for sedation.  These   medications may affect your judgment, balance and coordination.  Therefore,   for 24 hours, you have the following restrictions:   - DO NOT drive a car, operate machinery, make legal/financial decisions,   sign important papers or drink alcohol.    ACTIVITY:  Today: no heavy lifting, straining or running due to procedural   sedation/anesthesia.  The following day: return to full activity including work.  DIET:  Eat and drink normally unless instructed otherwise.     TREATMENT FOR COMMON SIDE EFFECTS:  - Mild abdominal pain, nausea, belching, bloating or excessive gas:  rest,   eat lightly and use a heating pad.  - Sore Throat: treat with throat lozenges and/or gargle with warm salt   water.  - Because air was used during the procedure, expelling large amounts of air   from your rectum or belching is normal.  - If a bowel prep was taken, you may not have a bowel movement for 1-3 days.    This is normal.  SYMPTOMS TO WATCH FOR AND REPORT TO YOUR PHYSICIAN:  1. Abdominal pain or bloating, other than gas cramps.  2. Chest pain.  3. Back pain.  4. Signs of infection such as: chills or fever occurring within 24 hours   after the procedure.  5. Rectal bleeding, which would show as bright red, maroon, or black stools.   (A tablespoon of blood from the rectum is not serious, especially if    hemorrhoids are present.)  6. Vomiting.  7. Weakness or dizziness.  GO DIRECTLY TO THE NEAREST EMERGENCY ROOM IF YOU HAVE ANY OF THE FOLLOWING:      Difficulty breathing              Chills and/or fever over 101 F   Persistent vomiting and/or vomiting blood   Severe abdominal pain   Severe chest pain   Black, tarry stools   Bleeding- more than one tablespoon   Any other symptom or condition that you feel may need urgent attention  Your doctor recommends these additional instructions:  If any biopsies were taken, your doctors clinic will contact you in 1 to 2   weeks with any results.  - Discharge patient to home.   - Patient has a contact number available for emergencies.  The signs and   symptoms of potential delayed complications were discussed with the   patient.  Return to normal activities tomorrow.  Written discharge   instructions were provided to the patient.   - Resume previous diet.   - Continue present medications.   - Await pathology results.   - Flexible sigmoidoscopy in 3 months to assess site of rectal polyp removal   and be sure no residual polyp.  For questions, problems or results please call your physician - Mike Walsh MD at Work:  [phone].  [facility], EMERGENCY ROOM PHONE NUMBER: [phone]  IF A COMPLICATION OR EMERGENCY SITUATION ARISES AND YOU ARE UNABLE TO REACH   YOUR PHYSICIAN - GO DIRECTLY TO THE EMERGENCY ROOM.  Mike Walsh MD  3/20/2023 9:21:39 AM  This report has been verified and signed electronically.  Dear patient,  As a result of recent federal legislation (The Federal Cures Act), you may   receive lab or pathology results from your procedure in your [facility]   account before your physician is able to contact you. Your physician or   their representative will relay the results to you with their   recommendations at their soonest availability.  Thank you,  [employer]

## 2023-03-21 NOTE — ASSESSMENT & PLAN NOTE
- Stable  - Tested negative on admission  - Continue oral vancomycin 125 mg QID  - Will plan on slow taper per GI recommendations  - Other stool studies pending   - Discontinue contact precautions       Left message for patient to call office back     Patient can let us know if he would like to try another pharmacy

## 2023-03-22 ENCOUNTER — PATIENT MESSAGE (OUTPATIENT)
Dept: GASTROENTEROLOGY | Facility: CLINIC | Age: 58
End: 2023-03-22
Payer: COMMERCIAL

## 2023-03-27 ENCOUNTER — TELEPHONE (OUTPATIENT)
Dept: GASTROENTEROLOGY | Facility: CLINIC | Age: 58
End: 2023-03-27
Payer: COMMERCIAL

## 2023-03-27 LAB
FINAL PATHOLOGIC DIAGNOSIS: NORMAL
GROSS: NORMAL
Lab: NORMAL

## 2023-03-27 NOTE — TELEPHONE ENCOUNTER
----- Message from Mike Walsh MD sent at 3/27/2023  3:05 PM CDT -----  Let patient know that biopsies show advanced polyp was removed and I would like to see her in clinic to discuss this further and discuss timing of next colonoscopy    SS

## 2023-03-27 NOTE — TELEPHONE ENCOUNTER
Called & spoke to pt  - Reviewed pathology including rectal polyp which showed features of low grade dysplasia which can turn into cancer if not removed  - Informed of plan to proceed w/ colonoscopy in 3 months rather than flex sig   - Pt agreeable to this plan   - Will update endo referral  - Reminder set to make sure this gets scheduled  - All questions answered

## 2023-04-03 ENCOUNTER — PATIENT MESSAGE (OUTPATIENT)
Dept: ADMINISTRATIVE | Facility: HOSPITAL | Age: 58
End: 2023-04-03
Payer: COMMERCIAL

## 2023-04-03 ENCOUNTER — PATIENT MESSAGE (OUTPATIENT)
Dept: GASTROENTEROLOGY | Facility: CLINIC | Age: 58
End: 2023-04-03
Payer: COMMERCIAL

## 2023-04-03 ENCOUNTER — PATIENT OUTREACH (OUTPATIENT)
Dept: ADMINISTRATIVE | Facility: HOSPITAL | Age: 58
End: 2023-04-03
Payer: COMMERCIAL

## 2023-04-03 RX ORDER — ESTRADIOL 1 MG/1
TABLET ORAL
Qty: 90 TABLET | Refills: 0 | Status: SHIPPED | OUTPATIENT
Start: 2023-04-03 | End: 2023-08-06 | Stop reason: SDUPTHER

## 2023-04-03 NOTE — PROGRESS NOTES
Health Maintenance Due   Topic Date Due    Cervical Cancer Screening  Never done    COVID-19 Vaccine (1) Never done    Pneumococcal Vaccines (Age 0-64) (1 - PCV) Never done    Influenza Vaccine (1) 09/01/2022     Chart review done. HM updated. Immunizations reviewed & updated. Care Everywhere updated.

## 2023-04-15 ENCOUNTER — TELEPHONE (OUTPATIENT)
Dept: ENDOSCOPY | Facility: HOSPITAL | Age: 58
End: 2023-04-15
Payer: COMMERCIAL

## 2023-04-15 NOTE — TELEPHONE ENCOUNTER
"----- Message from Helena Philippe RN sent at 3/29/2023  8:26 AM CDT -----  Procedure: Colonoscopy    Diagnosis: Surveillance colonoscopy    Procedure Timin-12 weeks- 2023 in AM per pt request    #If within 4 weeks selected, please yossi as high priority#    #If greater than 12 weeks, please select "4-12 weeks" and delay sending until 2 months prior to requested date#     Provider: Dr. ALEIDA Walsh    Location: [address]    Additional Scheduling Information: No scheduling concerns    Prep Specifications:Standard prep    Have you attached a patient to this message: yes      "

## 2023-04-27 ENCOUNTER — PATIENT MESSAGE (OUTPATIENT)
Dept: GASTROENTEROLOGY | Facility: CLINIC | Age: 58
End: 2023-04-27
Payer: COMMERCIAL

## 2023-05-01 ENCOUNTER — PATIENT MESSAGE (OUTPATIENT)
Dept: GASTROENTEROLOGY | Facility: CLINIC | Age: 58
End: 2023-05-01
Payer: COMMERCIAL

## 2023-05-01 ENCOUNTER — TELEPHONE (OUTPATIENT)
Dept: ENDOSCOPY | Facility: HOSPITAL | Age: 58
End: 2023-05-01
Payer: COMMERCIAL

## 2023-05-01 VITALS — HEIGHT: 62 IN | BODY MASS INDEX: 22.82 KG/M2 | WEIGHT: 124 LBS

## 2023-05-01 DIAGNOSIS — K51.00 ULCERATIVE PANCOLITIS WITHOUT COMPLICATION: Primary | ICD-10-CM

## 2023-05-03 ENCOUNTER — TELEPHONE (OUTPATIENT)
Dept: GASTROENTEROLOGY | Facility: CLINIC | Age: 58
End: 2023-05-03
Payer: COMMERCIAL

## 2023-05-03 ENCOUNTER — PATIENT MESSAGE (OUTPATIENT)
Dept: GASTROENTEROLOGY | Facility: CLINIC | Age: 58
End: 2023-05-03
Payer: COMMERCIAL

## 2023-06-07 ENCOUNTER — PATIENT MESSAGE (OUTPATIENT)
Dept: PRIMARY CARE CLINIC | Facility: CLINIC | Age: 58
End: 2023-06-07
Payer: COMMERCIAL

## 2023-06-08 ENCOUNTER — PATIENT MESSAGE (OUTPATIENT)
Dept: PRIMARY CARE CLINIC | Facility: CLINIC | Age: 58
End: 2023-06-08
Payer: COMMERCIAL

## 2023-06-08 RX ORDER — ALPRAZOLAM 0.25 MG/1
0.25 TABLET ORAL ONCE AS NEEDED
Qty: 2 TABLET | Refills: 0 | Status: SHIPPED | OUTPATIENT
Start: 2023-06-08

## 2023-06-08 RX ORDER — LIDOCAINE 50 MG/G
1 PATCH TOPICAL DAILY
Qty: 90 PATCH | Refills: 0 | Status: SHIPPED | OUTPATIENT
Start: 2023-06-08 | End: 2023-09-06

## 2023-06-08 NOTE — TELEPHONE ENCOUNTER
Patches pended if okay to [employer].    If sending in something for anxiety please send to local pharm NOT mail order.

## 2023-06-26 ENCOUNTER — ANESTHESIA EVENT (OUTPATIENT)
Dept: ENDOSCOPY | Facility: HOSPITAL | Age: 58
End: 2023-06-26
Payer: COMMERCIAL

## 2023-06-26 ENCOUNTER — HOSPITAL ENCOUNTER (OUTPATIENT)
Facility: HOSPITAL | Age: 58
Discharge: HOME | End: 2023-06-26
Attending: INTERNAL MEDICINE | Admitting: INTERNAL MEDICINE
Payer: COMMERCIAL

## 2023-06-26 ENCOUNTER — ANESTHESIA (OUTPATIENT)
Dept: ENDOSCOPY | Facility: HOSPITAL | Age: 58
End: 2023-06-26
Payer: COMMERCIAL

## 2023-06-26 VITALS
OXYGEN SATURATION: 98 % | DIASTOLIC BLOOD PRESSURE: 84 MMHG | SYSTOLIC BLOOD PRESSURE: 155 MMHG | RESPIRATION RATE: 16 BRPM | WEIGHT: 123 LBS | HEIGHT: 62 IN | HEART RATE: 68 BPM | BODY MASS INDEX: 22.63 KG/M2 | TEMPERATURE: 98 F

## 2023-06-26 DIAGNOSIS — K51.00 ULCERATIVE PANCOLITIS WITHOUT COMPLICATION: Primary | ICD-10-CM

## 2023-06-26 DIAGNOSIS — K51.00 ULCERATIVE PANCOLITIS: ICD-10-CM

## 2023-06-26 PROCEDURE — 45380 COLONOSCOPY AND BIOPSY: CPT | Mod: PT | Performed by: INTERNAL MEDICINE

## 2023-06-26 PROCEDURE — E9220 PRA ENDO ANESTHESIA: HCPCS | Mod: 33,,, | Performed by: NURSE ANESTHETIST, CERTIFIED REGISTERED

## 2023-06-26 PROCEDURE — 37000008 HC ANESTHESIA 1ST 15 MINUTES: Performed by: INTERNAL MEDICINE

## 2023-06-26 PROCEDURE — 88305 TISSUE EXAM BY PATHOLOGIST: CPT | Mod: 59 | Performed by: PATHOLOGY

## 2023-06-26 PROCEDURE — E9220 PRA ENDO ANESTHESIA: ICD-10-PCS | Mod: 33,,, | Performed by: NURSE ANESTHETIST, CERTIFIED REGISTERED

## 2023-06-26 PROCEDURE — 45380 PR COLONOSCOPY,BIOPSY: ICD-10-PCS | Mod: 33,,, | Performed by: INTERNAL MEDICINE

## 2023-06-26 PROCEDURE — 88305 TISSUE EXAM BY PATHOLOGIST: CPT | Mod: 26,,, | Performed by: PATHOLOGY

## 2023-06-26 PROCEDURE — 45380 COLONOSCOPY AND BIOPSY: CPT | Mod: 33,,, | Performed by: INTERNAL MEDICINE

## 2023-06-26 PROCEDURE — 37000009 HC ANESTHESIA EA ADD 15 MINS: Performed by: INTERNAL MEDICINE

## 2023-06-26 PROCEDURE — 63600175 PHARM REV CODE 636 W HCPCS: Performed by: NURSE ANESTHETIST, CERTIFIED REGISTERED

## 2023-06-26 PROCEDURE — 88305 TISSUE EXAM BY PATHOLOGIST: ICD-10-PCS | Mod: 26,,, | Performed by: PATHOLOGY

## 2023-06-26 PROCEDURE — 25000003 PHARM REV CODE 250: Performed by: INTERNAL MEDICINE

## 2023-06-26 PROCEDURE — 25000003 PHARM REV CODE 250: Performed by: NURSE ANESTHETIST, CERTIFIED REGISTERED

## 2023-06-26 PROCEDURE — 27201012 HC FORCEPS, HOT/COLD, DISP: Performed by: INTERNAL MEDICINE

## 2023-06-26 RX ORDER — PROPOFOL 10 MG/ML
VIAL (ML) INTRAVENOUS CONTINUOUS PRN
Status: DISCONTINUED | OUTPATIENT
Start: 2023-06-26 | End: 2023-06-26

## 2023-06-26 RX ORDER — SODIUM CHLORIDE 9 MG/ML
INJECTION, SOLUTION INTRAVENOUS CONTINUOUS
Status: DISCONTINUED | OUTPATIENT
Start: 2023-06-26 | End: 2023-06-26 | Stop reason: HOSPADM

## 2023-06-26 RX ORDER — PROPOFOL 10 MG/ML
VIAL (ML) INTRAVENOUS
Status: DISCONTINUED | OUTPATIENT
Start: 2023-06-26 | End: 2023-06-26

## 2023-06-26 RX ORDER — LIDOCAINE HYDROCHLORIDE 20 MG/ML
INJECTION INTRAVENOUS
Status: DISCONTINUED | OUTPATIENT
Start: 2023-06-26 | End: 2023-06-26

## 2023-06-26 RX ADMIN — SODIUM CHLORIDE: 0.9 INJECTION, SOLUTION INTRAVENOUS at 07:06

## 2023-06-26 RX ADMIN — PROPOFOL 60 MG: 10 INJECTION, EMULSION INTRAVENOUS at 07:06

## 2023-06-26 RX ADMIN — Medication 200 MCG/KG/MIN: at 07:06

## 2023-06-26 RX ADMIN — PROPOFOL 20 MG: 10 INJECTION, EMULSION INTRAVENOUS at 07:06

## 2023-06-26 RX ADMIN — LIDOCAINE HYDROCHLORIDE 60 MG: 20 INJECTION INTRAVENOUS at 07:06
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
## 2023-06-26 NOTE — TRANSFER OF CARE
"Anesthesia Transfer of Care Note    Patient: Ansley Bautista    Procedure(s) Performed: Procedure(s) (LRB):  COLONOSCOPY (N/A)    Patient location: GI    Anesthesia Type: general    Transport from OR: Transported from OR on room air with adequate spontaneous ventilation    Post pain: adequate analgesia    Post assessment: tolerated procedure well and no apparent anesthetic complications    Post vital signs: stable    Level of consciousness: sedated    Nausea/Vomiting: no nausea/vomiting    Complications: none    Transfer of care protocol was followed      Last vitals:   Visit Vitals  BP (!) 187/105 (BP Location: Left arm, Patient Position: Lying)   Pulse 83   Temp 36.6 °C (97.9 °F) (Temporal)   Resp 18   Ht 5' 2" (1.575 m)   Wt 55.8 kg (123 lb)   SpO2 98%   Breastfeeding No   BMI 22.50 kg/m²     "

## 2023-06-26 NOTE — ANESTHESIA POSTPROCEDURE EVALUATION
Anesthesia Post Evaluation    Patient: Ansley Bautista    Procedure(s) Performed: Procedure(s) (LRB):  COLONOSCOPY (N/A)    Final Anesthesia Type: general      Patient location during evaluation: PACU  Patient participation: Yes- Able to Participate  Level of consciousness: awake and alert  Post-procedure vital signs: reviewed and stable  Pain management: adequate  Airway patency: patent    PONV status at discharge: No PONV  Anesthetic complications: no      Cardiovascular status: blood pressure returned to baseline  Respiratory status: unassisted  Hydration status: euvolemic  Follow-up not needed.          Vitals Value Taken Time   /84 06/26/23 0826   Temp 36.6 °C (97.9 °F) 06/26/23 0802   Pulse 68 06/26/23 0826   Resp 16 06/26/23 0826   SpO2 98 % 06/26/23 0826         Event Time   Out of Recovery 08:34:21         Pain/Starr Score: Starr Score: 10 (6/26/2023  8:26 AM)

## 2023-06-26 NOTE — PROVATION PATIENT INSTRUCTIONS
Discharge Summary/Instructions after an Endoscopic Procedure  Patient Name: Ansley Bautista  Patient MRN: [id number]  Patient YOB: 1965 Monday, June 26, 2023  Mike Walsh MD  Dear patient,  As a result of recent federal legislation (The Federal Cures Act), you may   receive lab or pathology results from your procedure in your [facility]   account before your physician is able to contact you. Your physician or   their representative will relay the results to you with their   recommendations at their soonest availability.  Thank you,  RESTRICTIONS:  During your procedure today, you received medications for sedation.  These   medications may affect your judgment, balance and coordination.  Therefore,   for 24 hours, you have the following restrictions:   - DO NOT drive a car, operate machinery, make legal/financial decisions,   sign important papers or drink alcohol.    ACTIVITY:  Today: no heavy lifting, straining or running due to procedural   sedation/anesthesia.  The following day: return to full activity including work.  DIET:  Eat and drink normally unless instructed otherwise.     TREATMENT FOR COMMON SIDE EFFECTS:  - Mild abdominal pain, nausea, belching, bloating or excessive gas:  rest,   eat lightly and use a heating pad.  - Sore Throat: treat with throat lozenges and/or gargle with warm salt   water.  - Because air was used during the procedure, expelling large amounts of air   from your rectum or belching is normal.  - If a bowel prep was taken, you may not have a bowel movement for 1-3 days.    This is normal.  SYMPTOMS TO WATCH FOR AND REPORT TO YOUR PHYSICIAN:  1. Abdominal pain or bloating, other than gas cramps.  2. Chest pain.  3. Back pain.  4. Signs of infection such as: chills or fever occurring within 24 hours   after the procedure.  5. Rectal bleeding, which would show as bright red, maroon, or black stools.   (A tablespoon of blood from the rectum is not serious, especially if    hemorrhoids are present.)  6. Vomiting.  7. Weakness or dizziness.  GO DIRECTLY TO THE NEAREST EMERGENCY ROOM IF YOU HAVE ANY OF THE FOLLOWING:      Difficulty breathing              Chills and/or fever over 101 F   Persistent vomiting and/or vomiting blood   Severe abdominal pain   Severe chest pain   Black, tarry stools   Bleeding- more than one tablespoon   Any other symptom or condition that you feel may need urgent attention  Your doctor recommends these additional instructions:  If any biopsies were taken, your doctors clinic will contact you in 1 to 2   weeks with any results.  - Discharge patient to home (ambulatory).   - Patient has a contact number available for emergencies.  The signs and   symptoms of potential delayed complications were discussed with the   patient.  Return to normal activities tomorrow.  Written discharge   instructions were provided to the patient.   - Resume previous diet.   - Continue present medications.   - Return to primary care physician as previously scheduled.   - Repeat colonoscopy in 1 year for surveillance.   - Return to GI clinic as previously scheduled.   For questions, problems or results please call your physician - Mike Walsh MD at Work:  [phone].  [facility], EMERGENCY ROOM PHONE NUMBER: [phone]  IF A COMPLICATION OR EMERGENCY SITUATION ARISES AND YOU ARE UNABLE TO REACH   YOUR PHYSICIAN - GO DIRECTLY TO THE EMERGENCY ROOM.  Mike Walsh MD  6/26/2023 8:00:40 AM  This report has been verified and signed electronically.  Dear patient,  As a result of recent federal legislation (The Federal Cures Act), you may   receive lab or pathology results from your procedure in your [facility]   account before your physician is able to contact you. Your physician or   their representative will relay the results to you with their   recommendations at their soonest availability.  Thank you,  PROVATION

## 2023-06-26 NOTE — ANESTHESIA PREPROCEDURE EVALUATION
06/26/2023  Ansley Bautista is a 58 y.o., female.      Pre-op Assessment    I have reviewed the Patient Summary Reports.     I have reviewed the Nursing Notes. I have reviewed the NPO Status.   I have reviewed the Medications.     Review of Systems  Anesthesia Hx:  No problems with previous Anesthesia  Denies Family Hx of Anesthesia complications.   Denies Personal Hx of Anesthesia complications.   Hematology/Oncology:  Hematology Normal        Cardiovascular:   Hypertension    Hepatic/GI:   Bowel Prep.    Musculoskeletal:  Musculoskeletal Normal    Neurological:  Neurology Normal    Dermatological:  Skin Normal        Physical Exam  General: Well nourished and Cooperative    Airway:  Mallampati: II   Mouth Opening: Normal  TM Distance: Normal  Tongue: Normal  Neck ROM: Normal ROM    Dental:  Intact        Anesthesia Plan  Type of Anesthesia, risks & benefits discussed:    Anesthesia Type: Gen Natural Airway, MAC  Intra-op Monitoring Plan: Standard ASA Monitors  Post Op Pain Control Plan: multimodal analgesia and IV/PO Opioids PRN  Induction:  IV  Informed Consent: Informed consent signed with the Patient and all parties understand the risks and agree with anesthesia plan.  All questions answered.   ASA Score: 2    Ready For Surgery From Anesthesia Perspective.     .

## 2023-06-27 LAB
FINAL PATHOLOGIC DIAGNOSIS: NORMAL
GROSS: NORMAL
Lab: NORMAL

## 2023-06-29 ENCOUNTER — PATIENT MESSAGE (OUTPATIENT)
Dept: PRIMARY CARE CLINIC | Facility: CLINIC | Age: 58
End: 2023-06-29
Payer: COMMERCIAL

## 2023-06-30 DIAGNOSIS — K51.00 ULCERATIVE PANCOLITIS: ICD-10-CM

## 2023-06-30 RX ORDER — VALACYCLOVIR HYDROCHLORIDE 1 G/1
1000 TABLET, FILM COATED ORAL 2 TIMES DAILY
Qty: 60 TABLET | Refills: 1 | Status: SHIPPED | OUTPATIENT
Start: 2023-06-30 | End: 2024-06-29

## 2023-06-30 RX ORDER — PENCICLOVIR 10 MG/G
CREAM TOPICAL
Qty: 5 G | Refills: 1 | Status: SHIPPED | OUTPATIENT
Start: 2023-06-30

## 2023-07-03 RX ORDER — AZATHIOPRINE 50 MG/1
TABLET ORAL
Qty: 90 TABLET | Refills: 0 | Status: SHIPPED | OUTPATIENT
Start: 2023-07-03 | End: 2023-10-09

## 2023-07-03 NOTE — TELEPHONE ENCOUNTER
IBD medications Apriso 1.5 gm/day, Imuran 50 mg PO Daily (started 3/20/2018)    Refill request for Imuran 50 mg     Allergies reviewed Yes    Drug Monitoring labs/frequency for all IBD meds:  CBC, CMP every 3   months    Lab Results   Component Value Date    HEPBSAG Negative 11/10/2017    HEPBCAB Negative 11/10/2017     No results found for: TBGOLDPLUS  Lab Results   Component Value Date    KTJARXPJ79LD 90 10/14/2021     Lab Results   Component Value Date    WBC 4.32 06/20/2023    HGB 13.7 06/20/2023    HCT 41.3 06/20/2023    MCV 90 06/20/2023     06/20/2023     Lab Results   Component Value Date    CREATININE 0.93 06/20/2023    ALBUMIN 4.3 06/20/2023    BILITOT 0.8 06/20/2023    ALKPHOS 58 06/20/2023    AST 21 06/20/2023    ALT 14 06/20/2023       Lab due date (mo/yr):  09/2023    Labs scheduled: No    Next appt: 07/26/2023    RX refill sent to provider for amount until next labs: Yes

## 2023-07-26 ENCOUNTER — CLINICAL SUPPORT (OUTPATIENT)
Dept: GASTROENTEROLOGY | Facility: CLINIC | Age: 58
End: 2023-07-26
Payer: COMMERCIAL

## 2023-07-26 ENCOUNTER — OFFICE VISIT (OUTPATIENT)
Dept: GASTROENTEROLOGY | Facility: CLINIC | Age: 58
End: 2023-07-26
Payer: COMMERCIAL

## 2023-07-26 VITALS
BODY MASS INDEX: 22.02 KG/M2 | DIASTOLIC BLOOD PRESSURE: 87 MMHG | HEART RATE: 68 BPM | TEMPERATURE: 98 F | WEIGHT: 120.38 LBS | SYSTOLIC BLOOD PRESSURE: 159 MMHG | OXYGEN SATURATION: 99 %

## 2023-07-26 DIAGNOSIS — D84.9 IMMUNOSUPPRESSED STATUS: ICD-10-CM

## 2023-07-26 DIAGNOSIS — K51.00 ULCERATIVE PANCOLITIS WITHOUT COMPLICATION: Primary | ICD-10-CM

## 2023-07-26 DIAGNOSIS — D12.6 ADENOMATOUS POLYP OF COLON, UNSPECIFIED PART OF COLON: ICD-10-CM

## 2023-07-26 PROBLEM — K63.5 POLYP OF ASCENDING COLON: Status: RESOLVED | Noted: 2020-01-21 | Resolved: 2023-07-26

## 2023-07-26 PROCEDURE — 99215 PR OFFICE/OUTPT VISIT, EST, LEVL V, 40-54 MIN: ICD-10-PCS | Mod: S$GLB,,, | Performed by: INTERNAL MEDICINE

## 2023-07-26 PROCEDURE — 99215 OFFICE O/P EST HI 40 MIN: CPT | Mod: S$GLB,,, | Performed by: INTERNAL MEDICINE

## 2023-07-26 NOTE — PATIENT INSTRUCTIONS
- meet with pharmacist to discuss zeposia vs entyvio, also interest  - labs 9/2023, 12/2023, 3/2024  - colonoscopy 6/2024  - hold vit D and I will repeat in 9/2023 and advise whether to restart and how much
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
## 2023-07-26 NOTE — PATIENT INSTRUCTIONS
Vedolizumab (Entyvio)    Class: Integrin Blocker - Biologic product    Mechanism of Action: blocks alpha-4 beta-7 which plays a role in the inflammatory process for Inflammatory Bowel Disease (IBD) and works specifically in the gut.    Dosage/ Route/ Frequency: Intravenous (IV) Infusion that will be performed at an infusion center   - Loading Dose: 300 mg IV on week 0, 2, and 6  - Maintenance Dose: 300 mg IV every 8 weeks  - 30-minute infusion    Please call us immediately if you develop any of the below problems:  Please notify us if you are treated with antibiotics or experience signs of infection (ie. fevers, chills, sores, etc) given we may need to hold your medication during this time.     Side effects (>3% or 3 in 100 people):   Upper respiratory infections including nasopharyngitis (runny nose/sore throat), bronchitis, sinusitis, flu  Fatigue, headaches, back pain, cough, rash, fever, extremity/joint pain are rare    Serious side effects:     Infusion related reactions: These reactions might occur with the first or subsequent infusions. Includes rash, increased blood pressure and heart rate, swelling of your lips, shortness of breath, flushing etc.      Serious infections: viral/bacterial and fungal. Make sure to practice good hand hygiene and get the recommended vaccinations.     Liver Injury: This medication can increase your liver enzymes and is usually reversible with stopping the medication, your labs will be monitored regularly     PML (Progressive multifocal leukoencephalopathy): 1 case in a patient with multiple risk factors for PML (ie, HIV, CD4 ct 300, prolonged prior and concurrent immunosuppression).  Please be aware symptoms may include progressive weakness on one side of the body or clumsiness of limbs, disturbance of vision, and changes in thinking, memory, and orientation leading to confusion and personality changes.     Labs/Monitoring:  Prior to starting this new agent, we will be checking  labs to determine the safety of taking this medication including baseline blood counts, liver and kidney function tests, TB test and viral hepatitis testing.   Once started on the therapy we will monitor your blood count and your liver and kidney function tests as needed following evidence-based guidelines. TB test and viral hepatitis tests will be repeated every year. If you have any risk of exposure or travel to high risk area please notify us so we can do this testing sooner. If indicated your provider may also choose to check drug levels to monitor or optimize your response to the medication.     Vaccine counseling:   Avoid live vaccines which include:  Intranasal Influenza LAIV4 (FluMist Quadrivalent)  Measles, Mumps, Rubella (MMR)  Rotavirus (RotaTeq, Rotarix)  Typhoid oral capsule (Vivotif)  Varicella (Varivax)  Smallpox (Vaccinia)  Yellow Fever (YF-Vax)  Adenovirus  Cholera (Vaxchora)    Avoid consumption of raw seafood such as oysters/sushi.        Ozanimod (Zeposia)    Class: Small Molecule-sphingosine 1 phosphate (S1P1) receptor modulator    Mechanism of Action: Modifies the S1P1 receptor, which is responsible to keep immune cells from moving out of the lymph nodes and into the gut. By keeping these cells out of the gut, it prevents them from causing gut inflammation in ulcerative colitis.     Other indications: Multiple Sclerosis.    Dosage/ Route/ Frequency: oral tablet  Starter kit (7 days):   Days 1-4 take 0.23 mg by mouth daily (light grey capsules)  Days 5-7 take 0.46 mg by mouth daily (light grey/orange capsules)  Maintenance dose (starting day #8) 0.92 mg by mouth daily (orange capsules)    Side effects  Please notify us if you are treated with antibiotics or experience signs of infection (ie. fevers) given we may need to hold your medication during this time.    Common side effects (?4% or 4 in 100 patients): headache, nasopharyngitis (runny nose/ sore throat), upper respiratory infections, liver  test increase, muscle aches. These side effects tend to be more common with the induction dose (first 7 days).    Serious side effects:   Please call us immediately if you develop any of the below problems     Infection: tuberculosis, fungal infections, bacterial and viral infections such as herpes zoster (shingles). Lymphocyte blood count may decrease initially, but generally can go back up after being on the drug for a few weeks.These are rare, but possible. Lymphocyte count normalizes when the drug is stopped. Make sure you practice good hand hygiene and get the recommended vaccinations.     Cardiovascular effects such as low heart rate or high blood pressure may happen while on this medication, though chances are low (<1% or 1 in 100).     Liver Injury: This medication can increase your liver enzymes and is usually reversible with stopping the medication, your labs will be monitored regularly. Let your provider know immediately if you develop yellowing of the skin and eyes (jaundice), nausea, vomiting, unusual darkening of the urine, feeling tired or weak.    Fetal risk: women in childbearing years should use contraception while on this medication and for 3 months after stopping.    Respiratory effects: Even though very rare, this medication can cause decrease in your lung function (i.e.: shortness of breath). These symptoms resolve when the medication is stopped.     Posterior Reversible Encephalopathy Syndrome (PRES): PRES is a rare condition that affects the brain and can signs such as headache, seizures, confusion, and vision problems. The cause of PRES is not known though if recognized and early can be treated. Only one case of PRES was reported Zeposia clinical trials.     Macular edema (vision problem): your risk for this side effect is higher if you have diabetes or have had uveitis (inflammatory condition of the eye). Symptoms include blurriness, shadows or blind spot in the center of your vision, or  sensitivity to light.     Contraindications   Please let your provider know if in the last 6 months you have had  Myocardial infarction, unstable angina, stroke, transient ischemic attack, decompensated heart failure requiring hospitalization or Class III or IV heart failure  Presence of Mobitz type II second-degree and third degree atrioventricular (AV) block, sick sinus syndrome or sin-atrial block unless the patient has a functioning pacemaker  Severe untreated sleep apnea  Concomitant use of monoamine oxidase inhibitor     Labs/Monitoring:  Prior to starting this new agent, we will be checking labs to determine the safety of taking this medication including:   baseline blood counts, liver and kidney function tests  ECG  varicella antibodies  Baseline eye exam maybe required if you have history of diabetes or uveitis.   Once started on the therapy we will repeat blood count, liver and kidney test as needed following evidence-based guidelines. TB test and viral hepatitis tests will be repeated every year. If you have any risk of exposure or travel to high-risk areas, please notify us so we can do this testing sooner.     Vaccine counseling:   Avoid live vaccines, which include:  Intranasal Influenza LAIV4 (FluMist Quadrivalent)  Measles, Mumps, Rubella (MMR)  Rotavirus (RotaTeq, Rotarix)  Typhoid oral capsule (Vivotif)  Varicella (Varivax)  Smallpox (Vaccinia)  Yellow Fever (YF-Vax)  Adenovirus  Cholera (Vaxchora)    Avoid consumption of raw seafood such as oysters/sushi.

## 2023-07-26 NOTE — PROGRESS NOTES
IBD PATIENT INTAKE:    COVID symptoms in the last 7 days (runny nose, sore throat, congestion, cough, fever): No  PCP: Laura Cardoza  If not PCP-  number given to establish [phone]: N/A    ALLERGIES REVIEWED:  Yes    CHIEF COMPLAINT:    Chief Complaint   Patient presents with    Ulcerative Colitis       VITAL SIGNS:  BP (!) 159/87 (BP Location: Left arm, Patient Position: Sitting)   Pulse 68   Temp 98.4 °F (36.9 °C)   Wt 54.6 kg (120 lb 5.9 oz)   SpO2 99%   BMI 22.02 kg/m²      Change in medical, surgical, family or social history: No    IBD THERAPY (name, dose/frequency):  Apriso 1.5g daily Imuran 50mg daily  Last dose:  n/a    Next dose:  n/a  Infusion/Pharmacy: NA    Vitamins (be sure this has been put in medication list):   Vit D:  5000U daily     Vit B-12:  no   Folic Acid: no       Calcium: no     Iron:  no      MVI: no    Antibiotics (past 30 Days):  No  If yes   Indication:  Name of antibiotic:  Completion date:     REVIEWED MEDICATION LIST RECONCILED INCLUDING ABOVE MEDS:  Yes
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
## 2023-08-06 RX ORDER — ESTRADIOL 1 MG/1
TABLET ORAL
Qty: 90 TABLET | Refills: 0 | Status: SHIPPED | OUTPATIENT
Start: 2023-08-06 | End: 2023-11-03

## 2023-09-06 ENCOUNTER — PATIENT MESSAGE (OUTPATIENT)
Dept: GASTROENTEROLOGY | Facility: CLINIC | Age: 58
End: 2023-09-06
Payer: COMMERCIAL

## 2023-09-07 NOTE — TELEPHONE ENCOUNTER
Per Dr. Walsh: avoid Clindamycin and Augmentin. Pt has allergy to PCN and Azithromycin.     Spoke with Ansley Walsh's recommendation for her Dentist to determine which antibiotic to use.

## 2023-10-07 DIAGNOSIS — K51.00 ULCERATIVE PANCOLITIS: ICD-10-CM

## 2023-10-09 RX ORDER — AZATHIOPRINE 50 MG/1
TABLET ORAL
Qty: 90 TABLET | Refills: 0 | Status: SHIPPED | OUTPATIENT
Start: 2023-10-09 | End: 2024-03-18

## 2023-11-03 RX ORDER — ESTRADIOL 1 MG/1
TABLET ORAL
Qty: 90 TABLET | Refills: 0 | Status: SHIPPED | OUTPATIENT
Start: 2023-11-03

## 2023-11-03 NOTE — TELEPHONE ENCOUNTER
Care Due:                  Date            Visit Type   Department     Provider  --------------------------------------------------------------------------------                                MYCHART                              ANNUAL                              CHECKUP/PHY  [facility] PRIMARY  Last Visit: 10-      S            ROWAN Cardoza  Next Visit: None Scheduled  None         None Found                                                            Last  Test          Frequency    Reason                     Performed    Due Date  --------------------------------------------------------------------------------    Office Visit  15 months..  amLODIPine, penciclovir,   10-   01-                             valACYclovir.............    [employer] Embedded Care Due Messages. Reference number: [id number].   11/03/2023 1:20:26 AM CDT

## 2023-11-30 ENCOUNTER — TELEPHONE (OUTPATIENT)
Dept: GASTROENTEROLOGY | Facility: CLINIC | Age: 58
End: 2023-11-30
Payer: COMMERCIAL

## 2023-11-30 NOTE — TELEPHONE ENCOUNTER
----- Message from Mike Walsh MD sent at 10/9/2023  8:36 AM CDT -----  Remind patient to go for labs, known to not go on time.      Call and send email if you can't reach her.     Labs scheduled for 12/4

## 2023-12-01 NOTE — TELEPHONE ENCOUNTER
Spoke with Ansley:  - reminded her of labs scheduled 12/4 at 1030  - she would like her Vit D level check d/t feeling very tired lately  - taking Vit D 2000 IU/d  - last Vit D level drawn 9/6/23 = 68 (off Vit D supplement x 2 mos prior to this lab)    Dr. Walsh updated.

## 2023-12-01 NOTE — TELEPHONE ENCOUNTER
Spoke with Ansley:  Per Dr. Walsh:  - no lab for Vit D at this time  - may stop taking Vit D if she chooses    Ansley would like to continue on the Vit D 2000 IU daily.

## 2023-12-06 RX ORDER — AMLODIPINE BESYLATE 5 MG/1
5 TABLET ORAL DAILY
Qty: 90 TABLET | Refills: 3 | Status: SHIPPED | OUTPATIENT
Start: 2023-12-06

## 2023-12-06 RX ORDER — LIDOCAINE 50 MG/G
1 PATCH TOPICAL DAILY
Qty: 90 PATCH | Refills: 0 | Status: SHIPPED | OUTPATIENT
Start: 2023-12-06

## 2023-12-06 NOTE — TELEPHONE ENCOUNTER
----- Message from Maksim López sent at 12/6/2023  7:54 AM CST -----  Contact: [facility]   .Type:  RX Refill Request    Who Called:   Refill or New Rx: refill  RX Name and Strength: Disp Refills Start End ROB  amLODIPine (NORVASC) 5 MG tablet, and   LIDOcaine (LIDODERM) 5 %       How is the patient currently taking it? (ex. 1XDay):  Is this a 30 day or 90 day RX: 90  Preferred Pharm[facility] with phone number:[facility] [place] [facility] AT Portal to Registered [facility] Sites   Phone: [phone]  Fax: [phone]  Local or Mail Order: Mail order  Ordering Provider:  Would the patient rather a call back or a response via [facility]?  Call back  Best Call Back Number:[phone]  Additional Information:

## 2023-12-06 NOTE — TELEPHONE ENCOUNTER
No care due was identified.  [facility] Embedded Care Due Messages. Reference number: [id number].   12/06/2023 8:08:57 AM CST

## 2024-03-17 DIAGNOSIS — K51.00 ULCERATIVE PANCOLITIS: ICD-10-CM

## 2024-03-17 NOTE — TELEPHONE ENCOUNTER
Care Due:                  Date            Visit Type   Department     Provider  --------------------------------------------------------------------------------                                MYCHART                              ANNUAL                              CHECKUP/PHY  [facility] PRIMARY  Last Visit: 10-      S            ROWAN Cardoza  Next Visit: None Scheduled  None         None Found                                                            Last  Test          Frequency    Reason                     Performed    Due Date  --------------------------------------------------------------------------------    Office Visit  15 months..  amLODIPine, penciclovir,   10-   01-                             valACYclovir.............    [employer] Embedded Care Due Messages. Reference number: [id number].   3/17/2024 5:09:49 PM CDT

## 2024-03-18 RX ORDER — ESTRADIOL 1 MG/1
TABLET ORAL
Qty: 90 TABLET | Refills: 0 | OUTPATIENT
Start: 2024-03-18

## 2024-03-18 RX ORDER — AZATHIOPRINE 50 MG/1
TABLET ORAL
Qty: 90 TABLET | Refills: 0 | Status: SHIPPED | OUTPATIENT
Start: 2024-03-18

## 2024-04-08 ENCOUNTER — TELEPHONE (OUTPATIENT)
Dept: ENDOSCOPY | Facility: HOSPITAL | Age: 59
End: 2024-04-08
Payer: COMMERCIAL

## 2024-04-08 DIAGNOSIS — K51.919 ULCERATIVE COLITIS WITH COMPLICATION, UNSPECIFIED LOCATION: Primary | ICD-10-CM

## 2024-04-08 NOTE — TELEPHONE ENCOUNTER
Spoke to pt to schedule procedure(s) Colonoscopy       Physician to perform procedure(s) Dr. ALEIDA Walsh  Date of Procedure (s) 7/15/24  Arrival Time 6:50 AM  Time of Procedure(s) 7:50 AM   Location of Procedure(s) [place] [address] Floor  Type of Rx Prep sent to patient: Miralax  Instructions provided to patient via [facility]    Patient was informed on the following information and verbalized understanding. Screening questionnaire reviewed with patient and complete. If procedure requires anesthesia, a responsible adult needs to be present to accompany the patient home, patient cannot drive after receiving anesthesia. Appointment details are tentative, especially check-in time. Patient will receive a prep-op call 7 days prior to confirm check-in time for procedure. If applicable the patient should contact their pharmacy to verify Rx for procedure prep is ready for pick-up. Patient was advised to call the scheduling department at [phone] if pharmacy states no Rx is available. Patient was advised to call the endoscopy scheduling department if any questions or concerns arise.      [facility] Scheduling Department

## 2024-04-08 NOTE — TELEPHONE ENCOUNTER
"----- Message from Tania Sarabia sent at 2024  8:19 AM CDT -----    ----- Message -----  From: Helena Philippe RN  Sent: 2024   8:00 AM CDT  To: Jillian Mckeon Staff; #    Procedure: Colonoscopy    Diagnosis: Ulcerative Colitis    Procedure Timin-12 weeks; 2024    #If within 4 weeks selected, please yossi as high priority#    #If greater than 12 weeks, please select "4-12 weeks" and delay sending until 2 months prior to requested date#     Provider: Dr. SALVADOR Walsh    Location: [address]    Additional Scheduling Information: No scheduling concerns    Prep Specifications:Standard prep; miralax & gatorade    Have you attached a patient to this message: Yes      "

## 2024-04-10 NOTE — TELEPHONE ENCOUNTER
No care due was identified.  [facility] Embedded Care Due Messages. Reference number: [id number].   4/10/2024 4:49:29 PM CDT

## 2024-04-11 ENCOUNTER — TELEPHONE (OUTPATIENT)
Dept: PRIMARY CARE CLINIC | Facility: CLINIC | Age: 59
End: 2024-04-11
Payer: COMMERCIAL

## 2024-04-11 ENCOUNTER — TELEPHONE (OUTPATIENT)
Dept: OBSTETRICS AND GYNECOLOGY | Facility: CLINIC | Age: 59
End: 2024-04-11
Payer: COMMERCIAL

## 2024-04-11 DIAGNOSIS — Z00.00 ANNUAL PHYSICAL EXAM: Primary | ICD-10-CM

## 2024-04-11 DIAGNOSIS — Z12.31 ENCOUNTER FOR SCREENING MAMMOGRAM FOR MALIGNANT NEOPLASM OF BREAST: ICD-10-CM

## 2024-04-11 DIAGNOSIS — Z12.31 SCREENING MAMMOGRAM FOR BREAST CANCER: Primary | ICD-10-CM

## 2024-04-11 RX ORDER — ESTRADIOL 1 MG/1
1 TABLET ORAL DAILY
Qty: 90 TABLET | Refills: 0 | Status: SHIPPED | OUTPATIENT
Start: 2024-04-11 | End: 2024-05-06 | Stop reason: SDUPTHER

## 2024-04-11 RX ORDER — ESTRADIOL 1 MG/1
1 TABLET ORAL DAILY
Qty: 90 TABLET | Refills: 0 | OUTPATIENT
Start: 2024-04-11

## 2024-04-11 NOTE — TELEPHONE ENCOUNTER
----- Message from Jarret Servin sent at 4/11/2024 12:26 PM CDT -----  Contact: pt  Type: Requesting to speak with nurse        Who Called: PT  Regarding: questions about getting hormones  Would the patient rather a call back or a response via [facility]? Call back  Best Call Back Number:  [phone]  Additional Information:

## 2024-04-11 NOTE — TELEPHONE ENCOUNTER
----- Message from Sona Costello sent at 4/11/2024 12:34 PM CDT -----  Contact: [phone] Patient  Type: Orders Request    What orders/ testing are being requested? Annual Mammogram due 09/2022    Is there a future appointment scheduled for the patient with PCP? yes    When? 06/07/2024    Would you prefer a response via [facility]? Call Back Please. Thank you    Comments:

## 2024-04-11 NOTE — TELEPHONE ENCOUNTER
----- Message from Sona Costello sent at 4/11/2024 12:36 PM CDT -----  Contact: [phone] Patient  Pt is asking if the doctor can fill her prescription please she is out . Pt has an appointment on 06/07/2024. Pt has an appointment with her OBGYN in June also    Requesting an RX refill or new RX.  Is this a refill or new RX: new  RX name and strength (copy/paste from chart):  estradioL (ESTRACE) 1 MG tablet  Is this a 30 day or 90 day RX:   Pharmacy name and phone # (copy/paste from chart):    [facility] [place] AT Portal to [facility]  [place] [id number]  Phone: [phone] Fax: [phone]

## 2024-04-11 NOTE — TELEPHONE ENCOUNTER
Contacted pt.  She is scheduled for annual on 06/04/2024.  Pt requested MMG orders for her to schedule mmg.  Pt states she is out of hormones but will not be able to come into clinic until 06/2024.  Per pt her PCP was prescribing her hormones.  Instructed pt to contact PCP for refills.  Understanding voiced.

## 2024-05-06 ENCOUNTER — OFFICE VISIT (OUTPATIENT)
Dept: OBSTETRICS AND GYNECOLOGY | Facility: CLINIC | Age: 59
End: 2024-05-06
Payer: COMMERCIAL

## 2024-05-06 VITALS
WEIGHT: 125.88 LBS | RESPIRATION RATE: 12 BRPM | HEART RATE: 65 BPM | DIASTOLIC BLOOD PRESSURE: 72 MMHG | HEIGHT: 62 IN | SYSTOLIC BLOOD PRESSURE: 114 MMHG | OXYGEN SATURATION: 99 % | BODY MASS INDEX: 23.17 KG/M2

## 2024-05-06 DIAGNOSIS — Z11.51 SCREENING FOR HPV (HUMAN PAPILLOMAVIRUS): Primary | ICD-10-CM

## 2024-05-06 DIAGNOSIS — Z12.4 SCREENING FOR MALIGNANT NEOPLASM OF CERVIX: ICD-10-CM

## 2024-05-06 PROCEDURE — 99396 PREV VISIT EST AGE 40-64: CPT | Mod: S$GLB,,, | Performed by: STUDENT IN AN ORGANIZED HEALTH CARE EDUCATION/TRAINING PROGRAM

## 2024-05-06 PROCEDURE — 88175 CYTOPATH C/V AUTO FLUID REDO: CPT | Performed by: STUDENT IN AN ORGANIZED HEALTH CARE EDUCATION/TRAINING PROGRAM

## 2024-05-06 PROCEDURE — 87624 HPV HI-RISK TYP POOLED RSLT: CPT | Performed by: STUDENT IN AN ORGANIZED HEALTH CARE EDUCATION/TRAINING PROGRAM

## 2024-05-06 PROCEDURE — 99999 PR PBB SHADOW E&M-EST. PATIENT-LVL III: CPT | Mod: PBBFAC,,, | Performed by: STUDENT IN AN ORGANIZED HEALTH CARE EDUCATION/TRAINING PROGRAM

## 2024-05-06 RX ORDER — ESTRADIOL 1 MG/1
1 TABLET ORAL DAILY
Qty: 90 TABLET | Refills: 4 | Status: SHIPPED | OUTPATIENT
Start: 2024-05-06

## 2024-05-06 NOTE — PROGRESS NOTES
Subjective:    Patient ID: Ansley Bautista is a 58 y.o. y.o. female.     Chief Complaint: Annual Well Woman Exam     History of Present Illness:  Ansley presents today for Annual Well Woman exam. She is status post hysterectomy..She denies pelvic pain.  She denies breast tenderness, masses, nipple discharge. She admits to difficulty with urine leakage. She denies menopausal symptoms such as hotflashes, vaginal dryness, and night sweats. She denies bloating, early satiety, or weight changes. She is sexually active.     Menstrual History:   No LMP recorded. Patient has had a hysterectomy..     OB History          3    Para   3    Term   3            AB        Living             SAB        IAB        Ectopic        Multiple        Live Births                     The following portions of the patient's history were reviewed and updated as appropriate: allergies, current medications, past family history, past medical history, past social history, past surgical history, and problem list.    ROS:   CONSTITUTIONAL: Negative for fever, chills, diaphoresis, weakness, fatigue, weight loss, weight gain  ENT: negative for sore throat, nasal congestion, nasal discharge, epistaxis, tinnitus, hearing loss  EYES: negative for blurry vision, decreased vision, loss of vision, eye pain, diplopia, photophobia, discharge  SKIN: Negative for rash, itching, hives  RESPIRATORY: negative for cough, hemoptysis, shortness of breath, pleuritic chest pain, wheezing  CARDIOVASCULAR: negative for chest pain, dyspnea on exertion, orthopnea, paroxysmal nocturnal dyspnea, edema, palpitations  BREAST: negative for breast  tenderness, breast mass, nipple discharge, or skin changes  GASTROINTESTINAL: negative for abdominal pain, flank pain, nausea, vomiting, diarrhea, constipation, black stool, blood in stool  GENITOURINARY: negative for abnormal vaginal bleeding, amenorrhea, decreased libido, dysuria, genital sores, hematuria,  incontinence, menorrhagia, pelvic pain, urinary frequency, vaginal discharge  HEMATOLOGIC/LYMPHATIC: negative for swollen lymph nodes, bleeding, bruising  MUSCULOSKELETAL: negative for back pain, joint pain, joint stiffness, joint swelling, muscle pain, muscle weakness  NEUROLOGICAL: negative for dizzy/vertigo, headache, focal weakness, numbness/tingling, speech problems, loss of consciousness, confusion, memory loss  BEHAVORIAL/PSYCH: negative for anxiety, depression, psychosis  ENDOCRINE: negative for polydipsia/polyuria, palpitations, skin changes, temperature intolerance, unexpected weight changes  ALLERGIC/IMMUNOLOGIC: negative for urticaria, hay fever, angioedema      Objective:    Vital Signs:  Vitals:    05/06/24 1405   BP: 114/72   Pulse: 65   Resp: 12       Physical Exam:  General:  alert, cooperative, no distress   Skin:  Skin color, texture, turgor normal. No rashes or lesions   HEENT:  conjunctivae/corneas clear. PERRL.   Neck: supple, trachea midline, no adenopathy or thyromegally   Respiratory:  Normal effort   Breasts:  bilateral implants were noted without obvious palpable abnormalities   Abdomen:  soft, nontender, no palpable masses   Pelvis: External genitalia: normal general appearance  Urinary system: urethral meatus normal, bladder nontender  Vaginal: normal mucosa without prolapse or lesions  Cervix: normal appearance  Uterus: absent, removed surgically  Adnexa: normal size, nontender bilaterally   Extremities: Normal ROM; no edema, no cyanosis   Neurologial: Normal strength and tone. No focal numbness or weakness.   Psychiatric: normal mood, speech, dress, and thought processes     LABS:  No result found     A1C:  Recent Labs   Lab 12/22/22  0759   Hemoglobin A1C 5.2     CBC:  Recent Labs   Lab 12/04/23  1058 03/04/24  1330   WBC 6.30 5.84   RBC 4.69 4.22   Hemoglobin 13.9 13.1   Hematocrit 42.2 38.2   Platelets 246 227   MCV 90 91   MCH 29.6 31.0   MCHC 32.9 34.3     CMP:  Recent Labs   Lab  12/04/23  1058 03/04/24  1330   Glucose 99 88   Calcium 9.2 8.6 L   Albumin 4.2 4.1   Total Protein 7.6 7.1   Sodium 138 145   Potassium 4.4 4.4   CO2 26 30 H   Chloride 102 105   BUN 18 H 16   Creatinine 1.00 1.17   Alkaline Phosphatase 66 62   ALT 15 14   AST 31 24   Total Bilirubin 0.7 0.6     LIPIDS:  Recent Labs   Lab 12/22/22  0759   TSH 3.260   HDL 78 H   Cholesterol 199   Triglycerides 78   LDL Cholesterol 105.4   HDL/Cholesterol Ratio 39.2   Non-HDL Cholesterol 121   Total Cholesterol/HDL Ratio 2.6     TSH:  Recent Labs   Lab 12/22/22  0759   TSH 3.260       Assessment:       Healthy female exam.     1. Screening for HPV (human papillomavirus)    2. Screening for malignant neoplasm of cervix          Plan:      Problem List Items Addressed This Visit    None  Visit Diagnoses       Screening for HPV (human papillomavirus)    -  Primary    Relevant Medications    estradioL (ESTRACE) 1 MG tablet    Other Relevant Orders    HPV High Risk Genotypes, PCR    Screening for malignant neoplasm of cervix        Relevant Orders    Liquid-Based Pap Smear, Screening            COUNSELING:  Ansley was counseled on  use and side-effects of various contraceptive measures, A.C.O.G. Pap guidelines and recommendations for yearly pelvic exams in addition to recommendations for monthly self breast exams; to see her PCP for other health maintenance.

## 2024-05-10 LAB
HPV HR 12 DNA SPEC QL NAA+PROBE: NEGATIVE
HPV16 AG SPEC QL: NEGATIVE
HPV18 DNA SPEC QL NAA+PROBE: NEGATIVE

## 2024-05-13 LAB
FINAL PATHOLOGIC DIAGNOSIS: NORMAL
Lab: NORMAL

## 2024-05-25 DIAGNOSIS — K51.00 ULCERATIVE PANCOLITIS: ICD-10-CM

## 2024-05-27 RX ORDER — MESALAMINE 0.38 G/1
CAPSULE, EXTENDED RELEASE ORAL
Qty: 360 CAPSULE | Refills: 2 | Status: SHIPPED | OUTPATIENT
Start: 2024-05-27

## 2024-06-07 ENCOUNTER — OFFICE VISIT (OUTPATIENT)
Dept: PRIMARY CARE CLINIC | Facility: CLINIC | Age: 59
End: 2024-06-07
Payer: COMMERCIAL

## 2024-06-07 VITALS
DIASTOLIC BLOOD PRESSURE: 78 MMHG | SYSTOLIC BLOOD PRESSURE: 120 MMHG | OXYGEN SATURATION: 100 % | HEART RATE: 63 BPM | WEIGHT: 122.38 LBS | BODY MASS INDEX: 22.52 KG/M2 | HEIGHT: 62 IN

## 2024-06-07 DIAGNOSIS — G89.29 CHRONIC BILATERAL LOW BACK PAIN WITHOUT SCIATICA: ICD-10-CM

## 2024-06-07 DIAGNOSIS — I10 ESSENTIAL HYPERTENSION: ICD-10-CM

## 2024-06-07 DIAGNOSIS — Z00.00 ANNUAL PHYSICAL EXAM: Primary | ICD-10-CM

## 2024-06-07 DIAGNOSIS — N17.9 ACUTE KIDNEY INJURY: ICD-10-CM

## 2024-06-07 DIAGNOSIS — D84.9 IMMUNOSUPPRESSED STATUS: ICD-10-CM

## 2024-06-07 DIAGNOSIS — K51.00 ULCERATIVE PANCOLITIS WITHOUT COMPLICATION: ICD-10-CM

## 2024-06-07 DIAGNOSIS — M54.50 CHRONIC BILATERAL LOW BACK PAIN WITHOUT SCIATICA: ICD-10-CM

## 2024-06-07 PROBLEM — D72.819 LEUKOPENIA: Status: RESOLVED | Noted: 2020-01-21 | Resolved: 2024-06-07

## 2024-06-07 PROCEDURE — 99396 PREV VISIT EST AGE 40-64: CPT | Mod: 25,S$GLB,, | Performed by: INTERNAL MEDICINE

## 2024-06-07 PROCEDURE — 90471 IMMUNIZATION ADMIN: CPT | Mod: S$GLB,,, | Performed by: INTERNAL MEDICINE

## 2024-06-07 PROCEDURE — 99999 PR PBB SHADOW E&M-EST. PATIENT-LVL III: CPT | Mod: PBBFAC,,, | Performed by: INTERNAL MEDICINE

## 2024-06-07 PROCEDURE — 90677 PCV20 VACCINE IM: CPT | Mod: S$GLB,,, | Performed by: INTERNAL MEDICINE

## 2024-06-07 RX ORDER — LIDOCAINE 50 MG/G
1 PATCH TOPICAL DAILY
Qty: 90 PATCH | Refills: 1 | Status: SHIPPED | OUTPATIENT
Start: 2024-06-07
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
## 2024-06-17 ENCOUNTER — TELEPHONE (OUTPATIENT)
Dept: GASTROENTEROLOGY | Facility: CLINIC | Age: 59
End: 2024-06-17
Payer: COMMERCIAL

## 2024-06-18 NOTE — TELEPHONE ENCOUNTER
Per Dr. Walsh:    Patient is okay to reschedule 06/24/2024 appointment with Dr. Walsh to after her colonoscopy (scheduled 07/15/2024)  -Patient also needs to complete labs this month (CBC/CMP)    LVM instructing patient to call back or message in the portal to get labs scheduled and follow up appointment rescheduled

## 2024-06-20 ENCOUNTER — TELEPHONE (OUTPATIENT)
Dept: GASTROENTEROLOGY | Facility: CLINIC | Age: 59
End: 2024-06-20
Payer: COMMERCIAL

## 2024-06-20 NOTE — TELEPHONE ENCOUNTER
Called patient to confirm appointment scheduled on 6/24/24 at 01:00 PM  with Dr. Walsh. No answer, left voicemail.    Also stated, if pt will be coming, to do questionnaire on portal, and to call back if they had any questions, or need to reschedule.

## 2024-06-20 NOTE — TELEPHONE ENCOUNTER
----- Message from Mary Ann España sent at 6/20/2024 10:28 AM CDT -----  Contact: [phone]  Pt was informed that she doesn't have to see the dr until after she have her colonoscopy and wants to cancel appt please advise [phone]

## 2024-06-21 NOTE — TELEPHONE ENCOUNTER
Called and spoke with pt  I asked if she could come in today for 1:00 with Dr Walsh  She stated she will do her best to get here for 1:00     The skin at the access site was anesthetized. Using a micropuncture needle the right femoral artery was succesfully accessed times 1 using a INTRODUCER SHTH 6FR 50CM 12CM GW HEMOSTASIS ALYSE GONGORA DIL.

## 2024-06-23 DIAGNOSIS — Z11.51 SCREENING FOR HPV (HUMAN PAPILLOMAVIRUS): ICD-10-CM

## 2024-06-23 DIAGNOSIS — K51.00 ULCERATIVE PANCOLITIS: ICD-10-CM

## 2024-06-24 ENCOUNTER — PATIENT MESSAGE (OUTPATIENT)
Dept: GASTROENTEROLOGY | Facility: CLINIC | Age: 59
End: 2024-06-24
Payer: COMMERCIAL

## 2024-06-24 RX ORDER — ESTRADIOL 1 MG/1
1 TABLET ORAL
Qty: 90 TABLET | Refills: 0 | Status: SHIPPED | OUTPATIENT
Start: 2024-06-24

## 2024-06-24 NOTE — TELEPHONE ENCOUNTER
No care due was identified.  [facility] Embedded Care Due Messages. Reference number: [id number].   6/23/2024 8:50:24 PM CDT

## 2024-06-24 NOTE — TELEPHONE ENCOUNTER
"Primary provider: [facility]    IBD medications Apriso 1.5 gm/day, Imuran 50 mg PO Daily (started 3/20/2018)     Refill request for Imuran 50 mg PO Daily    Allergies reviewed Yes    Drug Monitoring labs/frequency for all IBD meds:  CBC/CMP q 3 mos  Lab Results   Component Value Date    HEPBSAG Negative 11/10/2017    HEPBCAB Negative 11/10/2017     No results found for: "TBGOLDPLUS"  No results found for: "QUANTNILVALU", "QUANTIFERON", "QUANTTBGDPL"   Lab Results   Component Value Date    TSPOTSCREN See result image under hyperlink 02/06/2019     Lab Results   Component Value Date    OKOGGALM27CK 68 09/05/2023     Lab Results   Component Value Date    WBC 5.84 03/04/2024    HGB 13.1 03/04/2024    HCT 38.2 03/04/2024    MCV 91 03/04/2024     03/04/2024     Lab Results   Component Value Date    CREATININE 1.17 03/04/2024    ALBUMIN 4.1 03/04/2024    BILITOT 0.6 03/04/2024    ALKPHOS 62 03/04/2024    AST 24 03/04/2024    ALT 14 03/04/2024       Lab due date (mo/yr):  6/2024    Labs scheduled: no - staff message sent.     Next appt: has scope 7/15/24, Staff currently working on getting pt scheduled for f/u apt after her scope (see encounter dated 6/20/24).    RX refill sent to provider for amount until next labs: No-  awaiting for pt to have lab appt scheduled.  Staff message sent.        "

## 2024-06-25 RX ORDER — AZATHIOPRINE 50 MG/1
50 TABLET ORAL DAILY
Qty: 30 TABLET | Refills: 0 | Status: SHIPPED | OUTPATIENT
Start: 2024-06-25

## 2024-07-09 ENCOUNTER — TELEPHONE (OUTPATIENT)
Dept: ENDOSCOPY | Facility: HOSPITAL | Age: 59
End: 2024-07-09
Payer: COMMERCIAL

## 2024-07-09 NOTE — TELEPHONE ENCOUNTER
Confirmed  colonoscopy appt for 7/15/24 with pt. Confirmed receipt of prep and instructions. Reviewed instructions pt denies taking blood thinning or glp1 medications.Confirmed ride home after procedure.Pt verbalized understanding.confirmed arrival time of 630am

## 2024-07-15 ENCOUNTER — ANESTHESIA (OUTPATIENT)
Dept: ENDOSCOPY | Facility: HOSPITAL | Age: 59
End: 2024-07-15
Payer: COMMERCIAL

## 2024-07-15 ENCOUNTER — ANESTHESIA EVENT (OUTPATIENT)
Dept: ENDOSCOPY | Facility: HOSPITAL | Age: 59
End: 2024-07-15
Payer: COMMERCIAL

## 2024-07-15 ENCOUNTER — HOSPITAL ENCOUNTER (OUTPATIENT)
Facility: HOSPITAL | Age: 59
Discharge: HOME | End: 2024-07-15
Attending: INTERNAL MEDICINE | Admitting: INTERNAL MEDICINE
Payer: COMMERCIAL

## 2024-07-15 VITALS
WEIGHT: 122 LBS | RESPIRATION RATE: 16 BRPM | TEMPERATURE: 98 F | HEART RATE: 72 BPM | DIASTOLIC BLOOD PRESSURE: 79 MMHG | SYSTOLIC BLOOD PRESSURE: 158 MMHG | BODY MASS INDEX: 22.45 KG/M2 | HEIGHT: 62 IN | OXYGEN SATURATION: 98 %

## 2024-07-15 DIAGNOSIS — K51.00 ULCERATIVE PANCOLITIS WITHOUT COMPLICATION: Primary | ICD-10-CM

## 2024-07-15 DIAGNOSIS — D12.6 ADENOMATOUS POLYP OF COLON, UNSPECIFIED PART OF COLON: ICD-10-CM

## 2024-07-15 DIAGNOSIS — K51.00 ULCERATIVE PANCOLITIS: ICD-10-CM

## 2024-07-15 PROCEDURE — 37000008 HC ANESTHESIA 1ST 15 MINUTES: Performed by: INTERNAL MEDICINE

## 2024-07-15 PROCEDURE — 45381 COLONOSCOPY SUBMUCOUS NJX: CPT | Mod: 33 | Performed by: INTERNAL MEDICINE

## 2024-07-15 PROCEDURE — 25000003 PHARM REV CODE 250: Performed by: NURSE ANESTHETIST, CERTIFIED REGISTERED

## 2024-07-15 PROCEDURE — 27202363 HC INJECTION AGENT, SUBMUCOSAL, ANY: Performed by: INTERNAL MEDICINE

## 2024-07-15 PROCEDURE — 27201012 HC FORCEPS, HOT/COLD, DISP: Performed by: INTERNAL MEDICINE

## 2024-07-15 PROCEDURE — E9220 PRA ENDO ANESTHESIA: HCPCS | Mod: ,,, | Performed by: NURSE ANESTHETIST, CERTIFIED REGISTERED

## 2024-07-15 PROCEDURE — 27201089 HC SNARE, DISP (ANY): Performed by: INTERNAL MEDICINE

## 2024-07-15 PROCEDURE — 45380 COLONOSCOPY AND BIOPSY: CPT | Mod: 33,,, | Performed by: INTERNAL MEDICINE

## 2024-07-15 PROCEDURE — 88305 TISSUE EXAM BY PATHOLOGIST: CPT | Mod: 59 | Performed by: STUDENT IN AN ORGANIZED HEALTH CARE EDUCATION/TRAINING PROGRAM

## 2024-07-15 PROCEDURE — 37000009 HC ANESTHESIA EA ADD 15 MINS: Performed by: INTERNAL MEDICINE

## 2024-07-15 PROCEDURE — 63600175 PHARM REV CODE 636 W HCPCS: Performed by: NURSE ANESTHETIST, CERTIFIED REGISTERED

## 2024-07-15 PROCEDURE — 45381 COLONOSCOPY SUBMUCOUS NJX: CPT | Mod: 33,51,, | Performed by: INTERNAL MEDICINE

## 2024-07-15 PROCEDURE — 45380 COLONOSCOPY AND BIOPSY: CPT | Mod: 33 | Performed by: INTERNAL MEDICINE

## 2024-07-15 RX ORDER — SODIUM CHLORIDE 9 MG/ML
INJECTION, SOLUTION INTRAVENOUS CONTINUOUS
Status: DISCONTINUED | OUTPATIENT
Start: 2024-07-15 | End: 2024-07-15 | Stop reason: HOSPADM

## 2024-07-15 RX ORDER — PROPOFOL 10 MG/ML
VIAL (ML) INTRAVENOUS CONTINUOUS PRN
Status: DISCONTINUED | OUTPATIENT
Start: 2024-07-15 | End: 2024-07-15

## 2024-07-15 RX ORDER — LIDOCAINE HYDROCHLORIDE 20 MG/ML
INJECTION INTRAVENOUS
Status: DISCONTINUED | OUTPATIENT
Start: 2024-07-15 | End: 2024-07-15

## 2024-07-15 RX ADMIN — PROPOFOL 150 MCG/KG/MIN: 10 INJECTION, EMULSION INTRAVENOUS at 08:07

## 2024-07-15 RX ADMIN — PROPOFOL 200 MCG/KG/MIN: 10 INJECTION, EMULSION INTRAVENOUS at 07:07

## 2024-07-15 RX ADMIN — SODIUM CHLORIDE: 0.9 INJECTION, SOLUTION INTRAVENOUS at 07:07

## 2024-07-15 RX ADMIN — LIDOCAINE HYDROCHLORIDE 100 MG: 20 INJECTION INTRAVENOUS at 07:07

## 2024-07-15 NOTE — ANESTHESIA PREPROCEDURE EVALUATION
07/15/2024  Ansley Bautista is a 59 y.o., female.      Pre-op Assessment    I have reviewed the Patient Summary Reports.     I have reviewed the Nursing Notes. I have reviewed the NPO Status.   I have reviewed the Medications.     Review of Systems  Cardiovascular:     Hypertension                                        Hepatic/GI:   PUD,        Bowel Conditions:             Physical Exam  General: Well nourished, Cooperative, Alert and Oriented    Airway:  Mallampati: II   Mouth Opening: Normal  TM Distance: Normal  Tongue: Normal  Neck ROM: Normal ROM    Dental:  Intact        Anesthesia Plan  Type of Anesthesia, risks & benefits discussed:    Anesthesia Type: Gen Natural Airway  Intra-op Monitoring Plan: Standard ASA Monitors  Post Op Pain Control Plan: multimodal analgesia  Induction:  IV  Informed Consent: Informed consent signed with the Patient and all parties understand the risks and agree with anesthesia plan.  All questions answered.   ASA Score: 2  Day of Surgery Review of History & Physical: H&P Update referred to the surgeon/provider.I have interviewed and examined the patient. I have reviewed the patient's H&P dated:     Ready For Surgery From Anesthesia Perspective.     .

## 2024-07-15 NOTE — TRANSFER OF CARE
"Anesthesia Transfer of Care Note    Patient: Ansley Bautista    Procedure(s) Performed: Procedure(s) (LRB):  COLONOSCOPY (N/A)    Patient location: PACU    Anesthesia Type: general    Transport from OR: Transported from OR on 2-3 L/min O2 by NC with adequate spontaneous ventilation    Post pain: adequate analgesia    Post assessment: no apparent anesthetic complications    Post vital signs: stable    Level of consciousness: sedated    Nausea/Vomiting: no nausea/vomiting    Complications: none    Transfer of care protocol was followed      Last vitals: Visit Vitals  BP (!) 174/74   Pulse 86   Temp 37.2 °C (99 °F)   Resp 16   Ht 5' 2" (1.575 m)   Wt 55.3 kg (122 lb)   SpO2 99%   Breastfeeding No   BMI 22.31 kg/m²     "

## 2024-07-15 NOTE — PROVATION PATIENT INSTRUCTIONS
Discharge Summary/Instructions after an Endoscopic Procedure  Patient Name: Ansley Bautista  Patient MRN: [id number]  Patient YOB: 1965  Monday, July 15, 2024  Mike Walsh MD  Dear patient,  As a result of recent federal legislation (The Federal Cures Act), you may   receive lab or pathology results from your procedure in your [facility]   account before your physician is able to contact you. Your physician or   their representative will relay the results to you with their   recommendations at their soonest availability.  Thank you,  RESTRICTIONS:  During your procedure today, you received medications for sedation.  These   medications may affect your judgment, balance and coordination.  Therefore,   for 24 hours, you have the following restrictions:   - DO NOT drive a car, operate machinery, make legal/financial decisions,   sign important papers or drink alcohol.    ACTIVITY:  Today: no heavy lifting, straining or running due to procedural   sedation/anesthesia.  The following day: return to full activity including work.  DIET:  Eat and drink normally unless instructed otherwise.     TREATMENT FOR COMMON SIDE EFFECTS:  - Mild abdominal pain, nausea, belching, bloating or excessive gas:  rest,   eat lightly and use a heating pad.  - Sore Throat: treat with throat lozenges and/or gargle with warm salt   water.  - Because air was used during the procedure, expelling large amounts of air   from your rectum or belching is normal.  - If a bowel prep was taken, you may not have a bowel movement for 1-3 days.    This is normal.  SYMPTOMS TO WATCH FOR AND REPORT TO YOUR PHYSICIAN:  1. Abdominal pain or bloating, other than gas cramps.  2. Chest pain.  3. Back pain.  4. Signs of infection such as: chills or fever occurring within 24 hours   after the procedure.  5. Rectal bleeding, which would show as bright red, maroon, or black stools.   (A tablespoon of blood from the rectum is not serious, especially if    hemorrhoids are present.)  6. Vomiting.  7. Weakness or dizziness.  GO DIRECTLY TO THE NEAREST EMERGENCY ROOM IF YOU HAVE ANY OF THE FOLLOWING:      Difficulty breathing              Chills and/or fever over 101 F   Persistent vomiting and/or vomiting blood   Severe abdominal pain   Severe chest pain   Black, tarry stools   Bleeding- more than one tablespoon   Any other symptom or condition that you feel may need urgent attention  Your doctor recommends these additional instructions:  If any biopsies were taken, your doctors clinic will contact you in 1 to 2   weeks with any results.  - Discharge patient to home.   - Patient has a contact number available for emergencies.  The signs and   symptoms of potential delayed complications were discussed with the   patient.  Return to normal activities tomorrow.  Written discharge   instructions were provided to the patient.   - Resume previous diet.   - Continue present medications.   - Refer patient to advanced endoscopy team to remove polyp.   - Repeat colonoscopy within next 4-6 weeks to remove polyp.  For questions, problems or results please call your physician - Mike Walsh MD at Work:  [phone].  [facility], EMERGENCY ROOM PHONE NUMBER: [phone]  IF A COMPLICATION OR EMERGENCY SITUATION ARISES AND YOU ARE UNABLE TO REACH   YOUR PHYSICIAN - GO DIRECTLY TO THE EMERGENCY ROOM.  Mike Walsh MD  7/15/2024 8:36:08 AM  This report has been verified and signed electronically.  Dear patient,  As a result of recent federal legislation (The Federal Cures Act), you may   receive lab or pathology results from your procedure in your [facility]   account before your physician is able to contact you. Your physician or   their representative will relay the results to you with their   recommendations at their soonest availability.  Thank you,  PROVATION
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
## 2024-07-15 NOTE — ANESTHESIA POSTPROCEDURE EVALUATION
Anesthesia Post Evaluation    Patient: Ansley Bautista    Procedure(s) Performed: Procedure(s) (LRB):  COLONOSCOPY (N/A)    Final Anesthesia Type: general      Patient location during evaluation: PACU  Patient participation: Yes- Able to Participate  Level of consciousness: awake and alert  Post-procedure vital signs: reviewed and stable  Pain management: adequate  Airway patency: patent    PONV status at discharge: No PONV  Anesthetic complications: no      Cardiovascular status: blood pressure returned to baseline  Respiratory status: spontaneous ventilation and room air  Hydration status: euvolemic  Follow-up not needed.              Vitals Value Taken Time   /79 07/15/24 0908   Temp 36.6 °C (97.9 °F) 07/15/24 0837   Pulse 72 07/15/24 0908   Resp 16 07/15/24 0908   SpO2 98 % 07/15/24 0908         Event Time   Out of Recovery 09:21:24         Pain/Starr Score: Starr Score: 10 (7/15/2024  8:52 AM)

## 2024-07-18 LAB
FINAL PATHOLOGIC DIAGNOSIS: NORMAL
GROSS: NORMAL
Lab: NORMAL

## 2024-07-19 ENCOUNTER — PATIENT MESSAGE (OUTPATIENT)
Dept: GASTROENTEROLOGY | Facility: CLINIC | Age: 59
End: 2024-07-19
Payer: COMMERCIAL

## 2024-07-23 ENCOUNTER — PATIENT MESSAGE (OUTPATIENT)
Dept: ENDOSCOPY | Facility: HOSPITAL | Age: 59
End: 2024-07-23
Payer: COMMERCIAL

## 2024-07-23 ENCOUNTER — TELEPHONE (OUTPATIENT)
Dept: ENDOSCOPY | Facility: HOSPITAL | Age: 59
End: 2024-07-23
Payer: COMMERCIAL

## 2024-07-23 DIAGNOSIS — D12.6 ADENOMATOUS POLYP OF COLON, UNSPECIFIED PART OF COLON: Primary | ICD-10-CM

## 2024-07-23 DIAGNOSIS — Z12.11 SCREENING FOR COLON CANCER: ICD-10-CM

## 2024-07-23 NOTE — TELEPHONE ENCOUNTER
Spoke to patient to schedule procedure(s) Colonoscopy       Physician to perform procedure(s) Dr. BRONSON Walsh  Date of Procedure (s) 8/23/2024  Arrival Time 7:00 AM  Time of Procedure(s) 8:00 AM   Location of Procedure(s) [place] 2nd Floor  Type of Rx Prep sent to patient: PEG  Instructions provided to patient via [facility]    Patient was informed on the following information and verbalized understanding. Screening questionnaire reviewed with patient and complete. If procedure requires anesthesia, a responsible adult needs to be present to accompany the patient home, patient cannot drive after receiving anesthesia. Appointment details are tentative, especially check-in time. Patient will receive a prep-op call 7 days prior to confirm check-in time for procedure. If applicable the patient should contact their pharmacy to verify Rx for procedure prep is ready for pick-up. Patient was advised to call the scheduling department at [phone] if pharmacy states no Rx is available. Patient was advised to call the endoscopy scheduling department if any questions or concerns arise.      [facility] Scheduling Department

## 2024-07-23 NOTE — TELEPHONE ENCOUNTER
ulcerative colitis with dysplasia- colon polyp [facility] only. If scheduled with me, I'm OK for a 60min case. lovell

## 2024-07-24 ENCOUNTER — PATIENT MESSAGE (OUTPATIENT)
Dept: GASTROENTEROLOGY | Facility: CLINIC | Age: 59
End: 2024-07-24
Payer: COMMERCIAL

## 2024-07-25 DIAGNOSIS — K51.00 ULCERATIVE PANCOLITIS: ICD-10-CM

## 2024-07-26 RX ORDER — AZATHIOPRINE 50 MG/1
50 TABLET ORAL DAILY
Qty: 90 TABLET | Refills: 0 | Status: SHIPPED | OUTPATIENT
Start: 2024-07-26

## 2024-07-26 NOTE — TELEPHONE ENCOUNTER
"Primary provider: [facility]    IBD medications Apriso 1.5 gm/day, Imuran 50 mg PO Daily (started 3/20/2018)     Refill request for Apriso 1.5 gm/day, Imuran 50 mg PO Daily (started 3/20/2018)     Allergies reviewed Yes    Drug Monitoring labs/frequency for all IBD meds:  CBC CMP q3 months    Lab Results   Component Value Date    HEPBSAG Negative 11/10/2017    HEPBCAB Negative 11/10/2017     No results found for: "TBGOLDPLUS"  No results found for: "QUANTNILVALU", "QUANTIFERON", "QUANTTBGDPL"   Lab Results   Component Value Date    TSPOTSCREN See result image under hyperlink 02/06/2019     Lab Results   Component Value Date    QWSYYEYJ55LV 68 09/05/2023     Lab Results   Component Value Date    WBC 4.20 07/02/2024    HGB 12.6 07/02/2024    HCT 38.1 07/02/2024    MCV 89 07/02/2024     07/02/2024     Lab Results   Component Value Date    CREATININE 0.9 07/02/2024    ALBUMIN 3.8 07/02/2024    BILITOT 0.6 07/02/2024    ALKPHOS 68 07/02/2024    AST 20 07/02/2024    ALT 11 07/02/2024       Lab due date (mo/yr):  10/2024    Labs scheduled: no - has OV 8/26/24    Next appt: scope 8/23/24, OV 8/26/24    RX refill sent to provider for amount until next labs: Yes      "

## 2024-08-12 ENCOUNTER — PATIENT MESSAGE (OUTPATIENT)
Dept: GASTROENTEROLOGY | Facility: CLINIC | Age: 59
End: 2024-08-12
Payer: COMMERCIAL

## 2024-08-16 ENCOUNTER — TELEPHONE (OUTPATIENT)
Dept: GASTROENTEROLOGY | Facility: CLINIC | Age: 59
End: 2024-08-16
Payer: COMMERCIAL

## 2024-08-16 NOTE — TELEPHONE ENCOUNTER
Contacted Pt for Endoscopy precall to confirm scheduled procedure(s) Colonoscopy on 8/23/2024. Pt did not answer. Left voicemail for pt to call Endoscopy Scheduling to confirm.

## 2024-08-22 ENCOUNTER — TELEPHONE (OUTPATIENT)
Dept: ENDOSCOPY | Facility: HOSPITAL | Age: 59
End: 2024-08-22
Payer: COMMERCIAL

## 2024-08-22 NOTE — TELEPHONE ENCOUNTER
Contacted Pt for Endoscopy precall to confirm scheduled procedure(s) Colonoscopy on 8/23/24. Pt did not answer. Left voicemail for pt to call Endoscopy Scheduling to confirm.

## 2024-08-23 ENCOUNTER — ANESTHESIA (OUTPATIENT)
Dept: ENDOSCOPY | Facility: HOSPITAL | Age: 59
End: 2024-08-23
Payer: COMMERCIAL

## 2024-08-23 ENCOUNTER — HOSPITAL ENCOUNTER (OUTPATIENT)
Facility: HOSPITAL | Age: 59
Discharge: HOME | End: 2024-08-23
Attending: INTERNAL MEDICINE | Admitting: INTERNAL MEDICINE
Payer: COMMERCIAL

## 2024-08-23 ENCOUNTER — ANESTHESIA EVENT (OUTPATIENT)
Dept: ENDOSCOPY | Facility: HOSPITAL | Age: 59
End: 2024-08-23
Payer: COMMERCIAL

## 2024-08-23 VITALS
SYSTOLIC BLOOD PRESSURE: 124 MMHG | DIASTOLIC BLOOD PRESSURE: 80 MMHG | WEIGHT: 122 LBS | HEART RATE: 74 BPM | RESPIRATION RATE: 18 BRPM | TEMPERATURE: 98 F | OXYGEN SATURATION: 98 % | BODY MASS INDEX: 22.45 KG/M2 | HEIGHT: 62 IN

## 2024-08-23 DIAGNOSIS — Z86.010 HISTORY OF COLON POLYPS: ICD-10-CM

## 2024-08-23 PROCEDURE — 37000008 HC ANESTHESIA 1ST 15 MINUTES: Performed by: INTERNAL MEDICINE

## 2024-08-23 PROCEDURE — 45338 SIGMOIDOSCOPY W/TUMR REMOVE: CPT | Performed by: INTERNAL MEDICINE

## 2024-08-23 PROCEDURE — 37000009 HC ANESTHESIA EA ADD 15 MINS: Performed by: INTERNAL MEDICINE

## 2024-08-23 PROCEDURE — 63600175 PHARM REV CODE 636 W HCPCS: Performed by: NURSE ANESTHETIST, CERTIFIED REGISTERED

## 2024-08-23 PROCEDURE — 45335 SIGMOIDOSCOPY W/SUBMUC INJ: CPT | Mod: 51,,, | Performed by: INTERNAL MEDICINE

## 2024-08-23 PROCEDURE — 27201089 HC SNARE, DISP (ANY): Performed by: INTERNAL MEDICINE

## 2024-08-23 PROCEDURE — 45335 SIGMOIDOSCOPY W/SUBMUC INJ: CPT | Performed by: INTERNAL MEDICINE

## 2024-08-23 PROCEDURE — 27202363 HC INJECTION AGENT, SUBMUCOSAL, ANY: Performed by: INTERNAL MEDICINE

## 2024-08-23 PROCEDURE — 45338 SIGMOIDOSCOPY W/TUMR REMOVE: CPT | Mod: ,,, | Performed by: INTERNAL MEDICINE

## 2024-08-23 PROCEDURE — 25000003 PHARM REV CODE 250: Performed by: NURSE ANESTHETIST, CERTIFIED REGISTERED

## 2024-08-23 PROCEDURE — 27201028 HC NEEDLE, SCLERO: Performed by: INTERNAL MEDICINE

## 2024-08-23 RX ORDER — SODIUM CHLORIDE 9 MG/ML
INJECTION, SOLUTION INTRAVENOUS CONTINUOUS
Status: DISCONTINUED | OUTPATIENT
Start: 2024-08-23 | End: 2024-08-23 | Stop reason: HOSPADM

## 2024-08-23 RX ORDER — HALOPERIDOL 5 MG/ML
0.5 INJECTION INTRAMUSCULAR EVERY 10 MIN PRN
Status: DISCONTINUED | OUTPATIENT
Start: 2024-08-23 | End: 2024-08-23 | Stop reason: HOSPADM

## 2024-08-23 RX ORDER — SODIUM CHLORIDE 0.9 % (FLUSH) 0.9 %
10 SYRINGE (ML) INJECTION
Status: DISCONTINUED | OUTPATIENT
Start: 2024-08-23 | End: 2024-08-23 | Stop reason: HOSPADM

## 2024-08-23 RX ORDER — PROPOFOL 10 MG/ML
VIAL (ML) INTRAVENOUS
Status: DISCONTINUED | OUTPATIENT
Start: 2024-08-23 | End: 2024-08-23

## 2024-08-23 RX ORDER — LIDOCAINE HYDROCHLORIDE 20 MG/ML
INJECTION INTRAVENOUS
Status: DISCONTINUED | OUTPATIENT
Start: 2024-08-23 | End: 2024-08-23

## 2024-08-23 RX ORDER — GLUCAGON 1 MG
1 KIT INJECTION
Status: DISCONTINUED | OUTPATIENT
Start: 2024-08-23 | End: 2024-08-23 | Stop reason: HOSPADM

## 2024-08-23 RX ORDER — PROPOFOL 10 MG/ML
VIAL (ML) INTRAVENOUS CONTINUOUS PRN
Status: DISCONTINUED | OUTPATIENT
Start: 2024-08-23 | End: 2024-08-23

## 2024-08-23 RX ORDER — FENTANYL CITRATE 50 UG/ML
25 INJECTION, SOLUTION INTRAMUSCULAR; INTRAVENOUS EVERY 5 MIN PRN
Status: DISCONTINUED | OUTPATIENT
Start: 2024-08-23 | End: 2024-08-23 | Stop reason: HOSPADM

## 2024-08-23 RX ADMIN — Medication 100 MG: at 07:08

## 2024-08-23 RX ADMIN — LIDOCAINE HYDROCHLORIDE 100 MG: 20 INJECTION INTRAVENOUS at 07:08

## 2024-08-23 RX ADMIN — Medication 60 MG: at 07:08

## 2024-08-23 RX ADMIN — GLYCOPYRROLATE 0.2 MG: 0.2 INJECTION, SOLUTION INTRAMUSCULAR; INTRAVENOUS at 07:08

## 2024-08-23 RX ADMIN — SODIUM CHLORIDE: 0.9 INJECTION, SOLUTION INTRAVENOUS at 07:08

## 2024-08-23 RX ADMIN — PROPOFOL 200 MCG/KG/MIN: 10 INJECTION, EMULSION INTRAVENOUS at 07:08

## 2024-08-23 NOTE — PLAN OF CARE
Dr. Walsh at bedside to review w/ pt and caregiver procedural findings, post op care and follow-up

## 2024-08-23 NOTE — ANESTHESIA POSTPROCEDURE EVALUATION
Anesthesia Post Evaluation    Patient: Ansley Bautista    Procedure(s) Performed: Procedure(s) (LRB):  COLONOSCOPY (N/A)    Final Anesthesia Type: general      Patient location during evaluation: [facility]  Patient participation: Yes- Able to Participate  Level of consciousness: awake and alert and oriented  Post-procedure vital signs: reviewed and stable  Pain management: adequate  Airway patency: patent  HUBERT mitigation strategies: Multimodal analgesia  PONV status at discharge: No PONV  Anesthetic complications: no      Cardiovascular status: blood pressure returned to baseline and hemodynamically stable  Respiratory status: unassisted, spontaneous ventilation and room air  Hydration status: euvolemic  Follow-up not needed.              Vitals Value Taken Time   /81 08/23/24 0832   Temp 36.4 °C (97.5 °F) 08/23/24 0830   Pulse 84 08/23/24 0835   Resp 19 08/23/24 0835   SpO2 95 % 08/23/24 0835   Vitals shown include unfiled device data.      No case tracking events are documented in the log.      Pain/Starr Score: Starr Score: 9 (8/23/2024  8:30 AM)

## 2024-08-23 NOTE — TRANSFER OF CARE
"Anesthesia Transfer of Care Note    Patient: Ansley Bautista    Procedure(s) Performed: Procedure(s) (LRB):  COLONOSCOPY (N/A)    Patient location: [facility]    Anesthesia Type: general    Transport from OR: Transported from OR on 2-3 L/min O2 by NC with adequate spontaneous ventilation    Post pain: adequate analgesia    Post assessment: no apparent anesthetic complications and tolerated procedure well    Post vital signs: stable    Level of consciousness: awake    Nausea/Vomiting: no nausea/vomiting    Complications: none    Transfer of care protocol was followed    Last vitals: Visit Vitals  BP (!) 191/117   Pulse 95   Temp 36.7 °C (98.1 °F)   Resp 16   Ht 5' 2" (1.575 m)   Wt 55.3 kg (122 lb)   SpO2 97%   Breastfeeding No   BMI 22.31 kg/m²     "

## 2024-08-23 NOTE — PLAN OF CARE
Pt meets criteria for discharge. VS WNL; respirations even and unlabored. No signs of acute distress. IV removed; pt tolerated well. D/c instructions reviewed w/ pt and spouse. Both verbalized understanding. No questions at this time. Pt transferred off unit to private vehicle via wheelchair accompanied by her spouse

## 2024-08-23 NOTE — PROVATION PATIENT INSTRUCTIONS
Discharge Summary/Instructions after an Endoscopic Procedure  Patient Name: Ansley Bautista  Patient MRN: [id number]  Patient YOB: 1965 Friday, August 23, 2024  Balbir Walsh MD  Dear patient,  As a result of recent federal legislation (The Federal Cures Act), you may   receive lab or pathology results from your procedure in your [facility]   account before your physician is able to contact you. Your physician or   their representative will relay the results to you with their   recommendations at their soonest availability.  Thank you,  RESTRICTIONS:  During your procedure today, you received medications for sedation.  These   medications may affect your judgment, balance and coordination.  Therefore,   for 24 hours, you have the following restrictions:   - DO NOT drive a car, operate machinery, make legal/financial decisions,   sign important papers or drink alcohol.    ACTIVITY:  Today: no heavy lifting, straining or running due to procedural   sedation/anesthesia.  The following day: return to full activity including work.  DIET:  Eat and drink normally unless instructed otherwise.     TREATMENT FOR COMMON SIDE EFFECTS:  - Mild abdominal pain, nausea, belching, bloating or excessive gas:  rest,   eat lightly and use a heating pad.  - Sore Throat: treat with throat lozenges and/or gargle with warm salt   water.  - Because air was used during the procedure, expelling large amounts of air   from your rectum or belching is normal.  - If a bowel prep was taken, you may not have a bowel movement for 1-3 days.    This is normal.  SYMPTOMS TO WATCH FOR AND REPORT TO YOUR PHYSICIAN:  1. Abdominal pain or bloating, other than gas cramps.  2. Chest pain.  3. Back pain.  4. Signs of infection such as: chills or fever occurring within 24 hours   after the procedure.  5. Rectal bleeding, which would show as bright red, maroon, or black stools.   (A tablespoon of blood from the rectum is not serious, especially if    hemorrhoids are present.)  6. Vomiting.  7. Weakness or dizziness.  GO DIRECTLY TO THE NEAREST EMERGENCY ROOM IF YOU HAVE ANY OF THE FOLLOWING:      Difficulty breathing              Chills and/or fever over 101 F   Persistent vomiting and/or vomiting blood   Severe abdominal pain   Severe chest pain   Black, tarry stools   Bleeding- more than one tablespoon   Any other symptom or condition that you feel may need urgent attention  Your doctor recommends these additional instructions:  If any biopsies were taken, your doctors clinic will contact you in 1 to 2   weeks with any results.  - Discharge patient to home (ambulatory).   - Resume previous diet; Discharge to home (ambulatory); Resume outpatient   medications  - Return to referring physician as previously scheduled.  For questions, problems or results please call your physician - Balbir Walsh MD at Work:  [phone].  [facility], EMERGENCY ROOM PHONE NUMBER: [phone]  IF A COMPLICATION OR EMERGENCY SITUATION ARISES AND YOU ARE UNABLE TO REACH   YOUR PHYSICIAN - GO DIRECTLY TO THE EMERGENCY ROOM.  Balbir Walsh MD  8/23/2024 8:59:53 AM  This report has been verified and signed electronically.  Dear patient,  As a result of recent federal legislation (The Federal Cures Act), you may   receive lab or pathology results from your procedure in your [facility]   account before your physician is able to contact you. Your physician or   their representative will relay the results to you with their   recommendations at their soonest availability.  Thank you,  PROVATION

## 2024-08-23 NOTE — ANESTHESIA PREPROCEDURE EVALUATION
Pre-operative evaluation for Procedure(s) (LRB):  COLONOSCOPY (N/A)    Ansley Bautista is a 59 y.o. female     Htn  Mets > 8  Uc  No cardiopulm dz  Multiple scopes w/ anes w/o issue      ECHO (if on file):  No results found for this or any previous visit.      Patient Active Problem List   Diagnosis    Essential hypertension    Ulcerative colitis (pancolitis)    History of Clostridium difficile colitis    Chronic bilateral low back pain without sciatica    Immunosuppressed status    Colon polyps- serrated polyp, tubulovillous polyp        Review of patient's allergies indicates:   Allergen Reactions    Adhesive Other (See Comments)     Blisters     Pcn [penicillins] Anaphylaxis    Zithromax [azithromycin] Rash       No current facility-administered medications on file prior to encounter.     Current Outpatient Medications on File Prior to Encounter   Medication Sig Dispense Refill    amLODIPine (NORVASC) 5 MG tablet Take 1 tablet (5 mg total) by mouth once daily. 90 tablet 3    estradioL (ESTRACE) 1 MG tablet TAKE 1 TABLET ONCE DAILY 90 tablet 0    mesalamine (APRISO) 0.375 gram Cp24 TAKE 4 CAPSULES ONCE DAILY 360 capsule 2    LIDOcaine (LIDODERM) 5 % Place 1 patch onto the skin once daily. Remove & Discard patch within 12 hours or as directed by MD 90 patch 1    valACYclovir (VALTREX) 1000 MG tablet Take 1 tablet (1,000 mg total) by mouth 2 (two) times daily. 60 tablet 1       Past Surgical History:   Procedure Laterality Date    ABDOMINOPLASTY  02/2021    bilateral knee scopes      breast augumentation      BREAST SURGERY      COLONOSCOPY      COLONOSCOPY N/A 11/06/2018    Procedure: COLONOSCOPY;  Surgeon: Mike Walsh MD;  Location: [facility] ([address] [place] [address]);  Service: Endoscopy;  Laterality: N/A;  schedule as 45 minute case-8/2018    Miralax prep OK-ST    COLONOSCOPY N/A 03/20/2023    Procedure: COLONOSCOPY;  Surgeon: Mike Walsh MD;  Location: [facility] ([address] [place] [address]);  Service: Endoscopy;  Laterality: N/A;  order  "created: previous order placed by Dr. ALEIDA Walsh on 2/3/22 unusable  pt wants only a monday/ requested mirilax prep  instructions sent via portal -sm  pre call, no answer- AJ    COLONOSCOPY N/A 2023    Procedure: COLONOSCOPY;  Surgeon: Mike Walsh MD;  [facility]  Service: Endoscopy;  Laterality: N/A;  miralax prep-instr [id number] only   pre-call confirmed; MB    COLONOSCOPY N/A 7/15/2024    Procedure: COLONOSCOPY;  Surgeon: Mike Walsh MD;  [facility]  Service: Endoscopy;  Laterality: N/A;  Ref By: ,instr sent via portal,[email or web address]  -pre call complete-tb-confirmed arrival time 630am    COSMETIC SURGERY      cyst removal off of breast      HYSTERECTOMY      PARTIAL HYSTERECTOMY      supercervical    surgery on both feet         Social History     Socioeconomic History    Marital status:    Tobacco Use    Smoking status: Former    Smokeless tobacco: Never    Tobacco comments:     quit around ; smoked when drank, not daily   Substance and Sexual Activity    Alcohol use: Yes     Alcohol/week: 1.0 - 2.0 standard drink of alcohol     Types: 1 - 2 Glasses of wine per week     Comment: seldom    Drug use: No    Sexual activity: Yes     Partners: Male     Birth control/protection: Post-menopausal   Social History Narrative              CBC: No results for input(s): "WBC", "RBC", "HGB", "HCT", "PLT", "MCV", "MCH", "MCHC" in the last 72 hours.    CMP: No results for input(s): "NA", "K", "CL", "CO2", "BUN", "CREATININE", "GLU", "MG", "PHOS", "CALCIUM", "ALBUMIN", "PROT", "ALKPHOS", "ALT", "AST", "BILITOT" in the last 72 hours.    INR  No results for input(s): "PT", "INR", "PROTIME", "APTT" in the last 72 hours.        Diagnostic Studies:      EKD Echo:  No results found for this or any previous visit.        Pre-op Assessment    I have reviewed the Patient Summary Reports.     I have reviewed the Nursing Notes. I have " reviewed the NPO Status.   I have reviewed the Medications.     Review of Systems  Anesthesia Hx:  No problems with previous Anesthesia                Social:  Non-Smoker       EENT/Dental:  EENT/Dental Normal           Cardiovascular:  Exercise tolerance: good   Hypertension                                        Pulmonary:  Pulmonary Normal                       Hepatic/GI:   PUD,        Bowel Conditions:         Musculoskeletal:  Musculoskeletal Normal                Endocrine:  Endocrine Normal            Psych:  Psychiatric Normal                    Physical Exam  General: Well nourished, Cooperative, Alert and Oriented    Airway:  Mallampati: I / I  Mouth Opening: Normal  TM Distance: Normal  Tongue: Normal  Neck ROM: Normal ROM    Dental:  Intact    Chest/Lungs:  Normal Respiratory Rate    Heart:  Rate: Normal        Anesthesia Plan  Type of Anesthesia, risks & benefits discussed:    Anesthesia Type: Gen Natural Airway  Intra-op Monitoring Plan: Standard ASA Monitors  Post Op Pain Control Plan: multimodal analgesia and IV/PO Opioids PRN  Induction:  IV  Informed Consent: Informed consent signed with the Patient and all parties understand the risks and agree with anesthesia plan.  All questions answered.   ASA Score: 2  Day of Surgery Review of History & Physical: H&P Update referred to the surgeon/provider.    Ready For Surgery From Anesthesia Perspective.     .
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
## 2024-08-26 ENCOUNTER — OFFICE VISIT (OUTPATIENT)
Dept: GASTROENTEROLOGY | Facility: CLINIC | Age: 59
End: 2024-08-26
Payer: COMMERCIAL

## 2024-08-26 VITALS
HEART RATE: 77 BPM | SYSTOLIC BLOOD PRESSURE: 124 MMHG | DIASTOLIC BLOOD PRESSURE: 81 MMHG | OXYGEN SATURATION: 98 % | BODY MASS INDEX: 22.19 KG/M2 | HEIGHT: 62 IN | TEMPERATURE: 99 F | WEIGHT: 120.56 LBS

## 2024-08-26 DIAGNOSIS — D84.9 IMMUNOSUPPRESSED STATUS: ICD-10-CM

## 2024-08-26 DIAGNOSIS — K51.00 ULCERATIVE PANCOLITIS WITHOUT COMPLICATION: Primary | ICD-10-CM

## 2024-08-26 PROCEDURE — G2211 COMPLEX E/M VISIT ADD ON: HCPCS | Mod: S$GLB,,, | Performed by: INTERNAL MEDICINE

## 2024-08-26 PROCEDURE — 99215 OFFICE O/P EST HI 40 MIN: CPT | Mod: S$GLB,,, | Performed by: INTERNAL MEDICINE

## 2024-08-26 NOTE — PATIENT INSTRUCTIONS
- labs CBC/CMP 10/2024, 1/2025  - referral to ENT  - skin exam yearly  - continue apriso 1.5 g/day   - continue imuran 50 mg PO daily
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
## 2024-08-28 ENCOUNTER — DOCUMENTATION ONLY (OUTPATIENT)
Dept: SURGERY | Facility: CLINIC | Age: 59
End: 2024-08-28
Payer: COMMERCIAL

## 2024-08-28 ENCOUNTER — TELEPHONE (OUTPATIENT)
Dept: SURGERY | Facility: CLINIC | Age: 59
End: 2024-08-28
Payer: COMMERCIAL

## 2024-08-28 NOTE — PROGRESS NOTES
Referral received for tongue lesion. Noted pt is seeing ENT in October. Spoke with NANCY Lay who states she will see pt, but to make sure Dr. Caceres or Dr. Yan are in clinic for possible Bx if needed. Verbalized understanding. Reached out to patient to schedule. Pt scheduled to see NANCY Lay 8/29/24. Reviewed directions to Cancer Center and direct contact information given to pt. Pt verbalized understanding.    Oncology Navigation   Intake  Date of Diagnosis:  (Tongue Lesion)  Cancer Type: Head and Neck  Type of Referral: Internal  Date of Referral: 08/27/24  Initial Nurse Navigator Contact: 08/28/24  Referral to Initial Contact Timeline (days): 1  First Appointment Available: 08/29/24  Appointment Date: 08/29/24  First Available Date vs. Scheduled Date (days): 0  Multiple appointments: No     Treatment  Current Status: Staging work-up    Surgery: -- (NANCY Lay)  Consult Date: 08/29/24                          Acuity      Follow Up  Follow up in 1 week (on 9/4/2024) for POC.

## 2024-08-29 ENCOUNTER — OFFICE VISIT (OUTPATIENT)
Dept: OTOLARYNGOLOGY | Facility: CLINIC | Age: 59
End: 2024-08-29
Payer: COMMERCIAL

## 2024-08-29 VITALS
HEART RATE: 70 BPM | DIASTOLIC BLOOD PRESSURE: 90 MMHG | WEIGHT: 121.25 LBS | SYSTOLIC BLOOD PRESSURE: 154 MMHG | BODY MASS INDEX: 22.18 KG/M2

## 2024-08-29 DIAGNOSIS — K51.00 ULCERATIVE PANCOLITIS WITHOUT COMPLICATION: ICD-10-CM

## 2024-08-29 DIAGNOSIS — K14.8 TONGUE LESION: Primary | ICD-10-CM

## 2024-08-29 PROCEDURE — 99999 PR PBB SHADOW E&M-EST. PATIENT-LVL III: CPT | Mod: PBBFAC,,, | Performed by: PHYSICIAN ASSISTANT

## 2024-08-29 PROCEDURE — 99203 OFFICE O/P NEW LOW 30 MIN: CPT | Mod: S$GLB,,, | Performed by: PHYSICIAN ASSISTANT
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
## 2024-09-12 ENCOUNTER — TELEPHONE (OUTPATIENT)
Dept: GASTROENTEROLOGY | Facility: CLINIC | Age: 59
End: 2024-09-12
Payer: COMMERCIAL

## 2024-09-16 ENCOUNTER — TELEPHONE (OUTPATIENT)
Dept: GASTROENTEROLOGY | Facility: CLINIC | Age: 59
End: 2024-09-16
Payer: COMMERCIAL

## 2024-09-16 ENCOUNTER — PATIENT MESSAGE (OUTPATIENT)
Dept: GASTROENTEROLOGY | Facility: CLINIC | Age: 59
End: 2024-09-16
Payer: COMMERCIAL

## 2024-09-16 NOTE — TELEPHONE ENCOUNTER
----- Message from Mike Walsh MD sent at 9/16/2024 10:46 AM CDT -----  IBD RN team-   Please call pt and let her know the colon polyp removed was benign and not a polyp with pathology consistent with endometrium and consistent with focus in the bowel of endometriosis.  Endometriosis is a condition that can cause pain in the lower part of the belly and trouble getting pregnant. Please explain to the that the endometrium is the inner lining of the uterus and sometimes these cells can grow outside the uterus and we don't know why this happens. Some people may have symptoms from this and others have no symptoms.  Some symptoms associated can be lower belly pain with menstrual cycles, during or after sexual intercourse, when urinating or having a bowel movement.  Let her know we have also copied her OB/GYN that saw her last in 5/2024 and she should follow up with them.      Dr. Walsh

## 2024-09-16 NOTE — TELEPHONE ENCOUNTER
Called pt to schedule f/u  - no answer, LVM & PM    ----- Message from Christa Billings RN sent at 9/13/2024  3:08 PM CDT -----  Frieda Simmons,     Can you please schedule her f/u visit. See below.  ----- Message -----  From: Christa Billings RN  Sent: 9/12/2024   2:47 PM CDT  To: Iris Armstrong MA; Jillian Mckeon Staff    Please look at her wrap up on 8/26/24. You scheduled the labs but not her follow up visit. Pls schedule her f/u visit as recommended @ 4/26/25.     I had a hard time seeing that f/u visit when I first started too. I had to get into the habit of making scheduling the f/u OV the 1st thing I do so I wouldn't miss it.

## 2024-09-17 ENCOUNTER — TELEPHONE (OUTPATIENT)
Dept: GASTROENTEROLOGY | Facility: CLINIC | Age: 59
End: 2024-09-17
Payer: COMMERCIAL

## 2024-09-17 NOTE — TELEPHONE ENCOUNTER
----- Message from Dolores Andrade sent at 9/16/2024  4:38 PM CDT -----  Regarding: Results  Contact: pt [phone]  Type:  Test Results    Who Called: pt   Name of Test (Lab/Mammo/Etc): colonoscopy  Date of Test: 7/15  Ordering Provider: Dr. Walsh  Where the test was performed: [facility]   Would the patient rather a call back or a response via [facility]? Callback   Best Call Back Number: [phone]

## 2024-09-17 NOTE — TELEPHONE ENCOUNTER
Spoke with Ansley:  - reviewed dx of endometriosis in colon  - she denies pain, constipation, bloating  - since she is asymptomatic, she doesn't want to pursue this urgently  - she doesn't want to incur debt if its not absolutely necessary at this time  - she will f/u with her OB/GYN as scheduled  - enc her to contact her OB/GYN or us if she develops sxs and we can address it at that time  - she states understanding and agrees with this plan    Dr. Walsh will be updated.

## 2024-09-26 ENCOUNTER — TELEPHONE (OUTPATIENT)
Dept: GASTROENTEROLOGY | Facility: CLINIC | Age: 59
End: 2024-09-26
Payer: COMMERCIAL

## 2024-09-26 NOTE — TELEPHONE ENCOUNTER
Spoke with Ansley:  - she describes symptoms of increased flatus after eating certain dairy products: ice cream and heavy whipping cream  - she'd like to take a test for lactose tolerance  - advised Dr. Walsh is out of the office this week and will be updated upon her return  - she states understanding and is thankful for the consideration

## 2024-09-26 NOTE — TELEPHONE ENCOUNTER
----- Message from Gayathri Jenkins sent at 9/26/2024  2:15 PM CDT -----  .Type:  Patient Call Back    Who Called: PT       Does the patient know what this is regarding?: PT WOULD LIKE TO SPEAK WITH THE OFFICE ABOUT GETTING A TEST TO SEE IF SHE'S LACTOSE INTOLERANCE     Would the patient rather a call back YES     Best Call Back Number: [phone]    Additional Information: Thank You

## 2024-10-12 ENCOUNTER — PATIENT MESSAGE (OUTPATIENT)
Dept: PRIMARY CARE CLINIC | Facility: CLINIC | Age: 59
End: 2024-10-12
Payer: COMMERCIAL

## 2024-10-15 DIAGNOSIS — K51.00 ULCERATIVE PANCOLITIS: ICD-10-CM

## 2024-10-16 RX ORDER — AZATHIOPRINE 50 MG/1
50 TABLET ORAL DAILY
Qty: 90 TABLET | Refills: 0 | Status: SHIPPED | OUTPATIENT
Start: 2024-10-16

## 2024-10-16 NOTE — TELEPHONE ENCOUNTER
Primary provider: [facility]    IBD medications Apriso 1.5 gm/day, Imuran 50 mg PO Daily (started 3/20/2018)     Refill request for Imuran 50 mg PO Daily    Allergies reviewed Yes    Drug Monitoring labs/frequency for all IBD meds:  CBC CMP q3 months       Lab Results   Component Value Date    WBC 4.22 10/15/2024    HGB 13.5 10/15/2024    HCT 41.6 10/15/2024    MCV 91 10/15/2024     10/15/2024     Lab Results   Component Value Date    CREATININE 0.9 10/15/2024    ALBUMIN 3.9 10/15/2024    BILITOT 0.7 10/15/2024    ALKPHOS 61 10/15/2024    AST 17 10/15/2024    ALT 13 10/15/2024       Lab due date (mo/yr):  1/2025    Labs scheduled: yes   1/14/25     Next appt: 4/28/25    RX refill sent to provider for amount until next labs: Yes

## 2024-12-24 ENCOUNTER — PATIENT MESSAGE (OUTPATIENT)
Dept: PRIMARY CARE CLINIC | Facility: CLINIC | Age: 59
End: 2024-12-24
Payer: COMMERCIAL

## 2024-12-28 DIAGNOSIS — Z11.51 SCREENING FOR HPV (HUMAN PAPILLOMAVIRUS): ICD-10-CM

## 2024-12-28 NOTE — TELEPHONE ENCOUNTER
No care due was identified.  [facility] Embedded Care Due Messages. Reference number: [id number].   12/28/2024 2:11:03 PM CST   palliative care

## 2024-12-30 RX ORDER — ESTRADIOL 1 MG/1
1 TABLET ORAL DAILY
Qty: 90 TABLET | Refills: 0 | Status: SHIPPED | OUTPATIENT
Start: 2024-12-30

## 2024-12-30 RX ORDER — AMLODIPINE BESYLATE 5 MG/1
5 TABLET ORAL DAILY
Qty: 90 TABLET | Refills: 3 | Status: SHIPPED | OUTPATIENT
Start: 2024-12-30

## 2025-01-30 DIAGNOSIS — K51.00 ULCERATIVE PANCOLITIS: ICD-10-CM

## 2025-01-31 RX ORDER — AZATHIOPRINE 50 MG/1
50 TABLET ORAL DAILY
Qty: 90 TABLET | Refills: 0 | Status: SHIPPED | OUTPATIENT
Start: 2025-01-31

## 2025-01-31 NOTE — TELEPHONE ENCOUNTER
"Primary provider: Dr. Yamile Walsh    IBD medications: Apriso 1.5 gm/day, Imuran 50 mg PO Daily (started 3/20/2018)     Refill request for:      Pended Medication(s)   Requested Prescriptions     Pending Prescriptions Disp Refills    azaTHIOprine (IMURAN) 50 mg Tab 90 tablet 0     Sig: Take 1 tablet (50 mg total) by mouth once daily.           Allergies reviewed: Yes    Drug Monitoring labs/frequency for all IBD meds:    CBC and CMP every 3 months    Lab Results   Component Value Date    HEPBSAG Negative 11/10/2017    HEPBCAB Negative 11/10/2017     No results found for: "TBGOLDPLUS"  No results found for: "QUANTNILVALU", "QUANTIFERON", "QUANTTBGDPL"   Lab Results   Component Value Date    TSPOTSCREN See result image under hyperlink 02/06/2019     Lab Results   Component Value Date    [id number] 68 09/05/2023     Lab Results   Component Value Date    WBC 3.90 01/14/2025    HGB 13.8 01/14/2025    HCT 40.7 01/14/2025    MCV 89 01/14/2025     01/14/2025     Lab Results   Component Value Date    CREATININE 0.9 01/14/2025    ALBUMIN 3.8 01/14/2025    BILITOT 0.7 01/14/2025    ALKPHOS 58 01/14/2025    AST 19 01/14/2025    ALT 11 01/14/2025       Lab due date (mo/yr):  4/2025    Labs scheduled: No - but patient has an OV before or when labs are due     Next appt: 4/28/25    RX refill sent to provider for amount until next labs: Yes       "

## 2025-03-16 DIAGNOSIS — K51.00 ULCERATIVE PANCOLITIS: ICD-10-CM

## 2025-03-17 RX ORDER — MESALAMINE 0.38 G/1
1.5 CAPSULE, EXTENDED RELEASE ORAL
Qty: 360 CAPSULE | Refills: 2 | OUTPATIENT
Start: 2025-03-17

## 2025-03-17 RX ORDER — MESALAMINE 0.38 G/1
1.5 CAPSULE, EXTENDED RELEASE ORAL DAILY
Qty: 360 CAPSULE | Refills: 0 | Status: SHIPPED | OUTPATIENT
Start: 2025-03-17

## 2025-03-17 NOTE — TELEPHONE ENCOUNTER
"Primary provider: Dr. Mike Walsh    IBD medications: Apriso 1.5 grams PO daily, Imuran 50 mg PO daily    Refill request for:      Pended Medication(s)   Requested Prescriptions     Pending Prescriptions Disp Refills    mesalamine (APRISO) 0.375 gram Cp24 360 capsule 2     Sig: Take 4 capsules (1.5 g total) by mouth once daily.           Allergies reviewed: Yes    Drug Monitoring labs/frequency for all IBD meds:    CBC and CMP every 3 months    Lab Results   Component Value Date    HEPBSAG Negative 11/10/2017    HEPBCAB Negative 11/10/2017     No results found for: "TBGOLDPLUS"  No results found for: "QUANTNILVALU", "QUANTIFERON", "QUANTTBGDPL"   Lab Results   Component Value Date    TSPOTSCREN See result image under hyperlink 02/06/2019     Lab Results   Component Value Date    [id number] 68 09/05/2023     Lab Results   Component Value Date    WBC 3.90 01/14/2025    HGB 13.8 01/14/2025    HCT 40.7 01/14/2025    MCV 89 01/14/2025     01/14/2025     Lab Results   Component Value Date    CREATININE 0.9 01/14/2025    ALBUMIN 3.8 01/14/2025    BILITOT 0.7 01/14/2025    ALKPHOS 58 01/14/2025    AST 19 01/14/2025    ALT 11 01/14/2025       Lab due date (mo/yr):  4/25    Labs scheduled: No - but patient has an OV before or when labs are due     Next appt: 4/28/25    RX refill sent to provider for amount until next labs: Yes       "

## 2025-04-24 ENCOUNTER — TELEPHONE (OUTPATIENT)
Dept: GASTROENTEROLOGY | Facility: CLINIC | Age: 60
End: 2025-04-24
Payer: COMMERCIAL

## 2025-04-24 NOTE — TELEPHONE ENCOUNTER
Patient returning call to schedule appointment.  - Scheduled for 6/5/25 at 11am  - Patient placed on waiting list for sooner appt

## 2025-04-24 NOTE — TELEPHONE ENCOUNTER
----- Message from Zenaida sent at 4/23/2025  3:50 PM CDT -----  Regarding: Appt reschedule  Contact: pt [phone]  Type:  Needs Medical AdviceWho Called: Ansley called in the regards to getting an appt rescheduled original appt Mon 4/28 pt would like a new appt to be between 11am -1pm pt said her car is in the shopWould the patient rather a call back or a response via [facility]? Call back Best Call Back Number: pt [phone] Additional Information:

## 2025-04-26 DIAGNOSIS — Z11.51 SCREENING FOR HPV (HUMAN PAPILLOMAVIRUS): ICD-10-CM

## 2025-04-26 DIAGNOSIS — K51.00 ULCERATIVE PANCOLITIS: ICD-10-CM

## 2025-04-26 NOTE — TELEPHONE ENCOUNTER
No care due was identified.  [facility] Embedded Care Due Messages. Reference number: [id number].   4/26/2025 12:08:08 PM CDT

## 2025-04-28 ENCOUNTER — TELEPHONE (OUTPATIENT)
Dept: GASTROENTEROLOGY | Facility: CLINIC | Age: 60
End: 2025-04-28
Payer: COMMERCIAL

## 2025-04-28 ENCOUNTER — PATIENT MESSAGE (OUTPATIENT)
Dept: GASTROENTEROLOGY | Facility: CLINIC | Age: 60
End: 2025-04-28
Payer: COMMERCIAL

## 2025-04-28 DIAGNOSIS — K51.00 ULCERATIVE PANCOLITIS WITHOUT COMPLICATION: Primary | ICD-10-CM

## 2025-04-28 RX ORDER — AZATHIOPRINE 50 MG/1
50 TABLET ORAL DAILY
Qty: 90 TABLET | Refills: 0 | Status: SHIPPED | OUTPATIENT
Start: 2025-04-28

## 2025-04-28 RX ORDER — ESTRADIOL 1 MG/1
1 TABLET ORAL DAILY
Qty: 90 TABLET | Refills: 3 | Status: SHIPPED | OUTPATIENT
Start: 2025-04-28

## 2025-04-28 NOTE — TELEPHONE ENCOUNTER
Referral for procedure from Telephone call       Spoke to Ansley to schedule procedure(s) Colonoscopy       Physician to perform procedure(s) Dr. ALEIDA Walsh  Date of Procedure (s) 7/28/25  Arrival Time 7:30 AM  Time of Procedure(s) 8:30 AM   Location of Procedure(s) [facility] [address] [facility]  Type of Rx Prep sent to patient: Miralax  Instructions provided to patient via [facility]    Patient was informed on the following information and verbalized understanding. Screening questionnaire reviewed with patient and complete. If procedure requires anesthesia, a responsible adult needs to be present to accompany the patient home, patient cannot drive after receiving anesthesia. Appointment details are tentative, especially check-in time. Patient will receive a prep-op call 7 days prior to confirm check-in time for procedure. If applicable the patient should contact their pharmacy to verify Rx for procedure prep is ready for pick-up. Patient was advised to call the scheduling department at [phone] if pharmacy states no Rx is available. Patient was advised to call the endoscopy scheduling department if any questions or concerns arise.      [facility] Scheduling Department

## 2025-04-28 NOTE — TELEPHONE ENCOUNTER
"Primary provider: Dr. Mike Walsh    IBD medications: current IBD meds: Apriso 1.5 gm/day, Imuran 50 mg PO Daily (started 3/20/2018)     Refill request for:      Pended Medication(s)   Requested Prescriptions     Pending Prescriptions Disp Refills    azaTHIOprine (IMURAN) 50 mg Tab 90 tablet 0     Sig: Take 1 tablet (50 mg total) by mouth once daily.           Allergies reviewed: Yes    Drug Monitoring labs/frequency for all IBD meds:    CBC and CMP every 3 months    Lab Results   Component Value Date    HEPBSAG Negative 11/10/2017    HEPBCAB Negative 11/10/2017     No results found for: "TBGOLDPLUS"  No results found for: "QUANTNILVALU", "QUANTIFERON", "QUANTTBGDPL"   Lab Results   Component Value Date    TSPOTSCREN See result image under hyperlink 02/06/2019     Lab Results   Component Value Date    [id number] 68 09/05/2023     Lab Results   Component Value Date    WBC 3.90 01/14/2025    HGB 13.8 01/14/2025    HCT 40.7 01/14/2025    MCV 89 01/14/2025     01/14/2025     Lab Results   Component Value Date    CREATININE 0.9 01/14/2025    ALBUMIN 3.8 01/14/2025    BILITOT 0.7 01/14/2025    ALKPHOS 58 01/14/2025    AST 19 01/14/2025    ALT 11 01/14/2025     Lab Results   Component Value Date    CHOL 183 05/27/2024    HDL 58 05/27/2024    LDLCALC 105.8 05/27/2024    TRIG 96 05/27/2024       Lab due date (mo/yr):  4/2025    Labs scheduled: No - will notify staff    Next appt: 6/5/2025    RX refill sent to provider for amount until next labs: Yes - Will approve per protocol as Pt has upcoming appointment and in efforts to not delay patient care. However, will notify staff of overdue CBC and CMP.       "

## 2025-04-29 ENCOUNTER — TELEPHONE (OUTPATIENT)
Dept: GASTROENTEROLOGY | Facility: CLINIC | Age: 60
End: 2025-04-29
Payer: COMMERCIAL

## 2025-04-30 ENCOUNTER — PATIENT MESSAGE (OUTPATIENT)
Dept: OBSTETRICS AND GYNECOLOGY | Facility: CLINIC | Age: 60
End: 2025-04-30
Payer: COMMERCIAL

## 2025-04-30 DIAGNOSIS — Z12.31 BREAST CANCER SCREENING BY MAMMOGRAM: Primary | ICD-10-CM

## 2025-05-08 ENCOUNTER — TELEPHONE (OUTPATIENT)
Dept: GASTROENTEROLOGY | Facility: CLINIC | Age: 60
End: 2025-05-08
Payer: COMMERCIAL

## 2025-05-08 ENCOUNTER — RESULTS FOLLOW-UP (OUTPATIENT)
Dept: GASTROENTEROLOGY | Facility: CLINIC | Age: 60
End: 2025-05-08

## 2025-05-08 NOTE — TELEPHONE ENCOUNTER
Reason for Call: Follow-up on Abnormal CBC  Contact: LM for callback [id number] and portal sent  Date of labs: 5/8/25  Abnormal level(s): WBC 3.75, has been steadily trending down  Disease phenotype: UC, pancolitis  Current IBD meds: Apriso 1.5 G/d, Imuran 50mg/d PO (started 3/20/18 , RX Adherence: Adherent   Next OV: 5/12/25  Provider: Dr. Walsh updated

## 2025-05-10 ENCOUNTER — RESULTS FOLLOW-UP (OUTPATIENT)
Dept: OBSTETRICS AND GYNECOLOGY | Facility: HOSPITAL | Age: 60
End: 2025-05-10

## 2025-05-12 ENCOUNTER — OFFICE VISIT (OUTPATIENT)
Dept: GASTROENTEROLOGY | Facility: CLINIC | Age: 60
End: 2025-05-12
Payer: COMMERCIAL

## 2025-05-12 ENCOUNTER — PATIENT MESSAGE (OUTPATIENT)
Dept: GASTROENTEROLOGY | Facility: CLINIC | Age: 60
End: 2025-05-12

## 2025-05-12 VITALS
WEIGHT: 127.19 LBS | TEMPERATURE: 98 F | SYSTOLIC BLOOD PRESSURE: 126 MMHG | HEART RATE: 78 BPM | OXYGEN SATURATION: 99 % | HEIGHT: 62 IN | BODY MASS INDEX: 23.4 KG/M2 | DIASTOLIC BLOOD PRESSURE: 77 MMHG

## 2025-05-12 DIAGNOSIS — D84.9 IMMUNOSUPPRESSED STATUS: ICD-10-CM

## 2025-05-12 DIAGNOSIS — E73.9 LACTOSE INTOLERANCE: ICD-10-CM

## 2025-05-12 DIAGNOSIS — K51.00 ULCERATIVE PANCOLITIS WITHOUT COMPLICATION: Primary | ICD-10-CM

## 2025-05-12 DIAGNOSIS — D12.6 ADENOMATOUS POLYP OF COLON, UNSPECIFIED PART OF COLON: ICD-10-CM

## 2025-05-12 DIAGNOSIS — R14.3 FLATULENCE: ICD-10-CM

## 2025-05-12 PROCEDURE — 99214 OFFICE O/P EST MOD 30 MIN: CPT | Mod: S$GLB,,, | Performed by: INTERNAL MEDICINE

## 2025-05-12 PROCEDURE — G2211 COMPLEX E/M VISIT ADD ON: HCPCS | Mod: S$GLB,,, | Performed by: INTERNAL MEDICINE

## 2025-05-12 PROCEDURE — 99999 PR PBB SHADOW E&M-EST. PATIENT-LVL IV: CPT | Mod: PBBFAC,,, | Performed by: INTERNAL MEDICINE

## 2025-05-12 NOTE — PATIENT INSTRUCTIONS
- Continue Apriso 1.5 grams per day  - Continue azathioprine 50 mg once daily  - Labs due 8/2025, 11/2025, 2/2026, 5/2026 (CBC, CMP) (Hep A IgG w/ August labs, thiopurine metabolites in August)  - Lactose challenge test - call insurance and see if covered  - Colonoscopy already scheduled 7/28/2025 - miralax/gatorade prep  - Yearly total body skin exams recommended (wear sun block and hats when outside)  - Schedule PAP smears per GYN instructions  - Avoid all NSAIDs (Advil, Motrin, Ibuprofen, Aleve, Naproxen, Naprosyn, Aspirin)  - Use antibiotics with caution. Always inform us if you are feeling unwell.   - Vaccines recommended - flu shot this fall
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
## 2025-05-14 ENCOUNTER — TELEPHONE (OUTPATIENT)
Dept: GASTROENTEROLOGY | Facility: CLINIC | Age: 60
End: 2025-05-14
Payer: COMMERCIAL

## 2025-06-12 ENCOUNTER — OFFICE VISIT (OUTPATIENT)
Facility: CLINIC | Age: 60
End: 2025-06-12
Payer: COMMERCIAL

## 2025-06-12 VITALS
WEIGHT: 127.56 LBS | HEART RATE: 71 BPM | HEIGHT: 62 IN | DIASTOLIC BLOOD PRESSURE: 92 MMHG | BODY MASS INDEX: 23.47 KG/M2 | SYSTOLIC BLOOD PRESSURE: 140 MMHG

## 2025-06-12 DIAGNOSIS — Z01.419 ENCNTR FOR GYN EXAM (GENERAL) (ROUTINE) W/O ABN FINDINGS: Primary | ICD-10-CM

## 2025-06-12 PROCEDURE — 99999 PR PBB SHADOW E&M-EST. PATIENT-LVL III: CPT | Mod: PBBFAC,,, | Performed by: STUDENT IN AN ORGANIZED HEALTH CARE EDUCATION/TRAINING PROGRAM

## 2025-06-12 PROCEDURE — 99396 PREV VISIT EST AGE 40-64: CPT | Mod: S$GLB,,, | Performed by: STUDENT IN AN ORGANIZED HEALTH CARE EDUCATION/TRAINING PROGRAM

## 2025-06-12 NOTE — PROGRESS NOTES
Subjective:    Patient ID: Ansley Bautista is a 59 y.o. y.o. female.     Chief Complaint: Annual Well Woman Exam     History of Present Illness:  Ansley presents today for Annual Well Woman exam. She describes her menses as absent.She denies pelvic pain.  She denies breast tenderness, masses, nipple discharge. She admits to difficulty with bowel movements- UC. She admits to menopausal symptoms such as hotflashes.. She denies bloating, early satiety, or weight changes. She is sexually active.     Menstrual History:   No LMP recorded. Patient has had a hysterectomy..     OB History          3    Para   3    Term   3            AB        Living   3         SAB        IAB        Ectopic        Multiple        Live Births   3                 The following portions of the patient's history were reviewed and updated as appropriate: allergies, current medications, past family history, past medical history, past social history, past surgical history, and problem list.    ROS:   CONSTITUTIONAL: Negative for fever, chills, diaphoresis, weakness, fatigue, weight loss, weight gain  ENT: negative for sore throat, nasal congestion, nasal discharge, epistaxis, tinnitus, hearing loss  EYES: negative for blurry vision, decreased vision, loss of vision, eye pain, diplopia, photophobia, discharge  SKIN: Negative for rash, itching, hives  RESPIRATORY: negative for cough, hemoptysis, shortness of breath, pleuritic chest pain, wheezing  CARDIOVASCULAR: negative for chest pain, dyspnea on exertion, orthopnea, paroxysmal nocturnal dyspnea, edema, palpitations  BREAST: negative for breast  tenderness, breast mass, nipple discharge, or skin changes  GASTROINTESTINAL: abdominal pain, diarrhea  GENITOURINARY: negative for abnormal vaginal bleeding, amenorrhea, decreased libido, dysuria, genital sores, hematuria, incontinence, menorrhagia, pelvic pain, urinary frequency, vaginal discharge  HEMATOLOGIC/LYMPHATIC: negative for  swollen lymph nodes, bleeding, bruising  MUSCULOSKELETAL: negative for back pain, joint pain, joint stiffness, joint swelling, muscle pain, muscle weakness  NEUROLOGICAL: negative for dizzy/vertigo, headache, focal weakness, numbness/tingling, speech problems, loss of consciousness, confusion, memory loss  BEHAVORIAL/PSYCH: negative for anxiety, depression, psychosis  ENDOCRINE: negative for polydipsia/polyuria, palpitations, skin changes, temperature intolerance, unexpected weight changes  ALLERGIC/IMMUNOLOGIC: negative for urticaria, hay fever, angioedema      Objective:    Vital Signs:  Vitals:    06/12/25 1308   BP: (!) 140/92   Pulse: 71       Physical Exam:  General:  alert, cooperative, no distress   Skin:  Skin color, texture, turgor normal. No rashes or lesions   HEENT:  conjunctivae/corneas clear. PERRL.   Neck: supple, trachea midline, no adenopathy or thyromegally   Respiratory:  Normal effort   Breasts:  no discharge, erythema, tenderness, or palpable masses; no axillary lymphadenopathy   Abdomen:  soft, nontender, no palpable masses   Pelvis: External genitalia: normal general appearance  Urinary system: urethral meatus normal, bladder nontender  Vaginal: normal mucosa without prolapse or lesions  Cervix: normal appearance  Uterus: absent, removed surgically  Adnexa: non palpable   Extremities: Normal ROM; no edema, no cyanosis   Neurologial: Normal strength and tone. No focal numbness or weakness.   Psychiatric: normal mood, speech, dress, and thought processes     LABS:  5/13/2024     A1C:  Recent Labs   Lab 05/27/24  0657   Hemoglobin A1C 5.2     CBC:  Recent Labs   Lab 01/14/25  1040 05/08/25  1027   WBC 3.90 3.75 L   RBC 4.57 4.81   Hemoglobin 13.8  --    HGB  --  14.5   Hematocrit 40.7  --    HCT  --  43.8   Platelet Count  --  203   Platelets 207  --    MCV 89 91   MCH 30.2 30.1   MCHC 33.9 33.1     CMP:  Recent Labs   Lab 01/14/25  1040 05/08/25  1027   Glucose 95 92   Calcium 9.1 9.1   Albumin  3.8 4.0   Protein Total  --  7.9   Total Protein 7.5  --    Sodium 141 141   Potassium 3.6 4.2   CO2 27 26   Chloride 107 108   BUN 18 20   Creatinine 0.9 0.9   Alkaline Phosphatase 58  --    ALP  --  60   ALT 11 11   AST 19 17   Total Bilirubin 0.7  --    Bilirubin Total  --  0.8     LIPIDS:  Recent Labs   Lab 12/22/22  0759 05/27/24  0657   TSH 3.260 2.831   HDL 78 H 58   Cholesterol 199 183   Triglycerides 78 96   LDL Cholesterol 105.4 105.8   HDL/Cholesterol Ratio 39.2 31.7   Non-HDL Cholesterol 121 125   Total Cholesterol/HDL Ratio 2.6 3.2     TSH:  Recent Labs   Lab 12/22/22  0759 05/27/24  0657   TSH 3.260 2.831       Assessment:       Healthy female exam.     1. Encntr for gyn exam (general) (routine) w/o abn findings          Plan:      Problem List Items Addressed This Visit    None  Visit Diagnoses         Encntr for gyn exam (general) (routine) w/o abn findings    -  Primary            COUNSELING:  Ansley was counseled on STD prevention, use and side-effects of various contraceptive measures, A.C.O.G. Pap guidelines and recommendations for yearly pelvic exams in addition to recommendations for monthly self breast exams; to see her PCP for other health maintenance.

## 2025-06-24 DIAGNOSIS — K51.00 ULCERATIVE PANCOLITIS: ICD-10-CM

## 2025-06-24 RX ORDER — AZATHIOPRINE 50 MG/1
50 TABLET ORAL DAILY
Qty: 30 TABLET | Refills: 1 | Status: SHIPPED | OUTPATIENT
Start: 2025-06-24

## 2025-06-24 RX ORDER — MESALAMINE 0.38 G/1
1.5 CAPSULE, EXTENDED RELEASE ORAL DAILY
Qty: 120 CAPSULE | Refills: 1 | Status: SHIPPED | OUTPATIENT
Start: 2025-06-24

## 2025-06-24 RX ORDER — MESALAMINE 0.38 G/1
CAPSULE, EXTENDED RELEASE ORAL
Qty: 360 CAPSULE | Refills: 0 | OUTPATIENT
Start: 2025-06-24

## 2025-06-24 NOTE — TELEPHONE ENCOUNTER
Refill Decision Note   Ansley Bautista  is requesting a refill authorization.  Brief Assessment and Rationale for Refill:  Quick Discontinue     Medication Therapy Plan:        Comments:     Note composed:11:13 AM 06/24/2025

## 2025-06-24 NOTE — TELEPHONE ENCOUNTER
"Primary provider: Dr. Mike Walsh    IBD medications: Apriso 1.5 gm/day, Imuran 50 mg PO Daily     Refill request for:      Pended Medication(s)   Requested Prescriptions     Pending Prescriptions Disp Refills    mesalamine (APRISO) 0.375 gram Cp24 360 capsule 0     Sig: Take 4 capsules (1.5 g total) by mouth once daily.    azaTHIOprine (IMURAN) 50 mg Tab 90 tablet 0     Sig: Take 1 tablet (50 mg total) by mouth once daily.           Allergies reviewed: Yes    Drug Monitoring labs/frequency for all IBD meds:    CBC and CMP every 3 months    Lab Results   Component Value Date    HEPBSAG Negative 11/10/2017    HEPBCAB Negative 11/10/2017     No results found for: "TBGOLDPLUS"  No results found for: "QUANTNILVALU", "QUANTIFERON", "QUANTTBGDPL"   Lab Results   Component Value Date    TSPOTSCREN See result image under hyperlink 02/06/2019     Lab Results   Component Value Date    [id number] 68 09/05/2023     Lab Results   Component Value Date    WBC 3.75 (L) 05/08/2025    HGB 14.5 05/08/2025    HCT 43.8 05/08/2025    MCV 91 05/08/2025     05/08/2025     Lab Results   Component Value Date    CREATININE 0.9 05/08/2025    ALBUMIN 4.0 05/08/2025    BILITOT 0.8 05/08/2025    ALKPHOS 60 05/08/2025    AST 17 05/08/2025    ALT 11 05/08/2025     Lab Results   Component Value Date    CHOL 183 05/27/2024    HDL 58 05/27/2024    LDLCALC 105.8 05/27/2024    TRIG 96 05/27/2024       Lab due date (mo/yr):  8/2025    Labs scheduled: Yes - 8/12/25    Next appt: 5/5/26    RX refill sent to provider for amount until next labs: Yes       "

## 2025-06-28 ENCOUNTER — PATIENT MESSAGE (OUTPATIENT)
Dept: GASTROENTEROLOGY | Facility: CLINIC | Age: 60
End: 2025-06-28
Payer: COMMERCIAL

## 2025-06-30 DIAGNOSIS — K51.00 ULCERATIVE PANCOLITIS: ICD-10-CM

## 2025-06-30 RX ORDER — MESALAMINE 0.38 G/1
1.5 CAPSULE, EXTENDED RELEASE ORAL DAILY
Qty: 360 CAPSULE | Refills: 3 | Status: SHIPPED | OUTPATIENT
Start: 2025-06-30

## 2025-06-30 NOTE — TELEPHONE ENCOUNTER
IBD Provider: Dr. Walsh    *Pt requests 90 day supply*     - Allergies reviewed  - Rx pended for approval    - OV: 5/5/26

## 2025-07-15 ENCOUNTER — TELEPHONE (OUTPATIENT)
Dept: GASTROENTEROLOGY | Facility: CLINIC | Age: 60
End: 2025-07-15
Payer: COMMERCIAL

## 2025-07-15 NOTE — TELEPHONE ENCOUNTER
Copied from CRM #[id number]. Topic: Medications - Medication Question  >> Jul 14, 2025  4:17 PM Dolores wrote:  Patient calling to inquire if she can she be sedated for a nerve block 6 days prior to colonoscopy.     Patient does not want to cancel colonoscopy. Just would like to know is safe for her to sedate 6 days before her colonoscopy. Pls call     Please communicate via [employer].

## 2025-07-21 ENCOUNTER — PATIENT MESSAGE (OUTPATIENT)
Dept: GASTROENTEROLOGY | Facility: CLINIC | Age: 60
End: 2025-07-21
Payer: COMMERCIAL

## 2025-07-28 ENCOUNTER — HOSPITAL ENCOUNTER (OUTPATIENT)
Facility: HOSPITAL | Age: 60
Discharge: HOME | End: 2025-07-28
Attending: INTERNAL MEDICINE | Admitting: INTERNAL MEDICINE
Payer: COMMERCIAL

## 2025-07-28 ENCOUNTER — ANESTHESIA EVENT (OUTPATIENT)
Dept: ENDOSCOPY | Facility: HOSPITAL | Age: 60
End: 2025-07-28
Payer: COMMERCIAL

## 2025-07-28 ENCOUNTER — ANESTHESIA (OUTPATIENT)
Dept: ENDOSCOPY | Facility: HOSPITAL | Age: 60
End: 2025-07-28
Payer: COMMERCIAL

## 2025-07-28 VITALS
BODY MASS INDEX: 22.88 KG/M2 | TEMPERATURE: 98 F | OXYGEN SATURATION: 98 % | WEIGHT: 124.31 LBS | HEIGHT: 62 IN | HEART RATE: 55 BPM | SYSTOLIC BLOOD PRESSURE: 128 MMHG | DIASTOLIC BLOOD PRESSURE: 77 MMHG | RESPIRATION RATE: 18 BRPM

## 2025-07-28 DIAGNOSIS — K51.00 ULCERATIVE PANCOLITIS WITHOUT COMPLICATION: Primary | ICD-10-CM

## 2025-07-28 DIAGNOSIS — K51.00 ULCERATIVE PANCOLITIS WITHOUT COMPLICATION: ICD-10-CM

## 2025-07-28 DIAGNOSIS — K51.00 ULCERATIVE PANCOLITIS: ICD-10-CM

## 2025-07-28 PROCEDURE — 88305 TISSUE EXAM BY PATHOLOGIST: CPT | Mod: TC | Performed by: INTERNAL MEDICINE

## 2025-07-28 PROCEDURE — 27201089 HC SNARE, DISP (ANY): Performed by: INTERNAL MEDICINE

## 2025-07-28 PROCEDURE — 88305 TISSUE EXAM BY PATHOLOGIST: CPT | Mod: 26,,, | Performed by: STUDENT IN AN ORGANIZED HEALTH CARE EDUCATION/TRAINING PROGRAM

## 2025-07-28 PROCEDURE — 45380 COLONOSCOPY AND BIOPSY: CPT | Mod: 33,XS | Performed by: INTERNAL MEDICINE

## 2025-07-28 PROCEDURE — 45385 COLONOSCOPY W/LESION REMOVAL: CPT | Mod: 33 | Performed by: INTERNAL MEDICINE

## 2025-07-28 PROCEDURE — 45380 COLONOSCOPY AND BIOPSY: CPT | Mod: 33,XS,, | Performed by: INTERNAL MEDICINE

## 2025-07-28 PROCEDURE — 37000008 HC ANESTHESIA 1ST 15 MINUTES: Performed by: INTERNAL MEDICINE

## 2025-07-28 PROCEDURE — 45385 COLONOSCOPY W/LESION REMOVAL: CPT | Mod: 33,,, | Performed by: INTERNAL MEDICINE

## 2025-07-28 PROCEDURE — 25000003 PHARM REV CODE 250: Performed by: NURSE ANESTHETIST, CERTIFIED REGISTERED

## 2025-07-28 PROCEDURE — 37000009 HC ANESTHESIA EA ADD 15 MINS: Performed by: INTERNAL MEDICINE

## 2025-07-28 PROCEDURE — 63600175 PHARM REV CODE 636 W HCPCS: Performed by: NURSE ANESTHETIST, CERTIFIED REGISTERED

## 2025-07-28 PROCEDURE — 27201012 HC FORCEPS, HOT/COLD, DISP: Performed by: INTERNAL MEDICINE

## 2025-07-28 RX ORDER — PROPOFOL 10 MG/ML
VIAL (ML) INTRAVENOUS
Status: DISCONTINUED | OUTPATIENT
Start: 2025-07-28 | End: 2025-07-28

## 2025-07-28 RX ORDER — LIDOCAINE HYDROCHLORIDE 20 MG/ML
INJECTION INTRAVENOUS
Status: DISCONTINUED | OUTPATIENT
Start: 2025-07-28 | End: 2025-07-28

## 2025-07-28 RX ORDER — DEXMEDETOMIDINE HYDROCHLORIDE 100 UG/ML
INJECTION, SOLUTION INTRAVENOUS
Status: DISCONTINUED | OUTPATIENT
Start: 2025-07-28 | End: 2025-07-28

## 2025-07-28 RX ORDER — DIPHENHYDRAMINE HYDROCHLORIDE 50 MG/ML
INJECTION, SOLUTION INTRAMUSCULAR; INTRAVENOUS
Status: DISCONTINUED | OUTPATIENT
Start: 2025-07-28 | End: 2025-07-28

## 2025-07-28 RX ADMIN — DEXMEDETOMIDINE 8 MCG: 100 INJECTION, SOLUTION, CONCENTRATE INTRAVENOUS at 09:07

## 2025-07-28 RX ADMIN — DIPHENHYDRAMINE HYDROCHLORIDE 6.25 MG: 50 INJECTION INTRAMUSCULAR; INTRAVENOUS at 09:07

## 2025-07-28 RX ADMIN — SODIUM CHLORIDE: 0.9 INJECTION, SOLUTION INTRAVENOUS at 08:07

## 2025-07-28 RX ADMIN — LIDOCAINE HYDROCHLORIDE 50 MG: 20 INJECTION INTRAVENOUS at 09:07

## 2025-07-28 RX ADMIN — PROPOFOL 70 MG: 10 INJECTION, EMULSION INTRAVENOUS at 09:07

## 2025-07-28 RX ADMIN — PROPOFOL 200 MCG/KG/MIN: 10 INJECTION, EMULSION INTRAVENOUS at 09:07

## 2025-07-28 NOTE — [employer] PATIENT INSTRUCTIONS
Discharge Summary/Instructions after an Endoscopic Procedure  Patient Name: Ansley Bautista  Patient MRN: [id number]  Patient YOB: 1965 Monday, July 28, 2025  Mike Walsh MD  Dear patient,  As a result of recent federal legislation (The Federal Cures Act), you may   receive lab or pathology results from your procedure in your [facility]   account before your physician is able to contact you. Your physician or   their representative will relay the results to you with their   recommendations at their soonest availability.  Thank you,  RESTRICTIONS:  During your procedure today, you received medications for sedation.  These   medications may affect your judgment, balance and coordination.  Therefore,   for 24 hours, you have the following restrictions:   - DO NOT drive a car, operate machinery, make legal/financial decisions,   sign important papers or drink alcohol.    ACTIVITY:  Today: no heavy lifting, straining or running due to procedural   sedation/anesthesia.  The following day: return to full activity including work.  DIET:  Eat and drink normally unless instructed otherwise.     TREATMENT FOR COMMON SIDE EFFECTS:  - Mild abdominal pain, nausea, belching, bloating or excessive gas:  rest,   eat lightly and use a heating pad.  - Sore Throat: treat with throat lozenges and/or gargle with warm salt   water.  - Because air was used during the procedure, expelling large amounts of air   from your rectum or belching is normal.  - If a bowel prep was taken, you may not have a bowel movement for 1-3 days.    This is normal.  SYMPTOMS TO WATCH FOR AND REPORT TO YOUR PHYSICIAN:  1. Abdominal pain or bloating, other than gas cramps.  2. Chest pain.  3. Back pain.  4. Signs of infection such as: chills or fever occurring within 24 hours   after the procedure.  5. Rectal bleeding, which would show as bright red, maroon, or black stools.   (A tablespoon of blood from the rectum is not serious, especially if    hemorrhoids are present.)  6. Vomiting.  7. Weakness or dizziness.  GO DIRECTLY TO THE NEAREST EMERGENCY ROOM IF YOU HAVE ANY OF THE FOLLOWING:      Difficulty breathing              Chills and/or fever over 101 F   Persistent vomiting and/or vomiting blood   Severe abdominal pain   Severe chest pain   Black, tarry stools   Bleeding- more than one tablespoon   Any other symptom or condition that you feel may need urgent attention  Your doctor recommends these additional instructions:  If any biopsies were taken, your doctors clinic will contact you in 1 to 2   weeks with any results.  - Discharge patient to home.   - Resume previous diet.   - Continue present medications.   - Await pathology results.   - Repeat colonoscopy (date not yet determined) for surveillance but likely   in 1 year.   - Return to GI office as previously scheduled.   For questions, problems or results please call your physician - Mike Walsh MD at Work:  [phone].  [facility] [place] [facility], EMERGENCY ROOM PHONE NUMBER: [phone]  IF A COMPLICATION OR EMERGENCY SITUATION ARISES AND YOU ARE UNABLE TO REACH   YOUR PHYSICIAN - GO DIRECTLY TO THE EMERGENCY ROOM.  Mike Walsh MD  7/28/2025 9:47:50 AM  This report has been verified and signed electronically.  Dear patient,  As a result of recent federal legislation (The Federal Cures Act), you may   receive lab or pathology results from your procedure in your [facility]   account before your physician is able to contact you. Your physician or   their representative will relay the results to you with their   recommendations at their soonest availability.  Thank you,  [employer]ATION
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
## 2025-07-28 NOTE — ANESTHESIA POSTPROCEDURE EVALUATION
Anesthesia Post Evaluation    Patient: Ansley Bautista    Procedure(s) Performed: Procedure(s) (LRB):  COLONOSCOPY (N/A)    Final Anesthesia Type: general      Patient location during evaluation: GI PACU  Patient participation: Yes- Able to Participate  Level of consciousness: awake and alert and oriented  Post-procedure vital signs: reviewed and stable  Pain management: adequate  Airway patency: patent    PONV status at discharge: No PONV  Anesthetic complications: no      Cardiovascular status: hemodynamically stable  Respiratory status: unassisted, spontaneous ventilation and room air  Hydration status: euvolemic  Follow-up not needed.              Vitals Value Taken Time   /77 07/28/25 10:17   Temp 36.4 °C (97.5 °F) 07/28/25 09:48   Pulse 55 07/28/25 10:17   Resp 18 07/28/25 10:17   SpO2 98 % 07/28/25 10:17         No case tracking events are documented in the log.      Pain/Starr Score: Starr Score: 10 (7/28/2025  9:52 AM)

## 2025-07-30 LAB
ESTROGEN SERPL-MCNC: NORMAL PG/ML
INSULIN SERPL-ACNC: NORMAL U[IU]/ML
LAB AP CLINICAL INFORMATION: NORMAL
LAB AP GROSS DESCRIPTION: NORMAL
LAB AP PERFORMING LOCATION(S): NORMAL
LAB AP REPORT FOOTNOTES: NORMAL